# Patient Record
Sex: FEMALE | Race: BLACK OR AFRICAN AMERICAN | NOT HISPANIC OR LATINO | Employment: OTHER | URBAN - METROPOLITAN AREA
[De-identification: names, ages, dates, MRNs, and addresses within clinical notes are randomized per-mention and may not be internally consistent; named-entity substitution may affect disease eponyms.]

---

## 2017-03-15 ENCOUNTER — GENERIC CONVERSION - ENCOUNTER (OUTPATIENT)
Dept: OTHER | Facility: OTHER | Age: 67
End: 2017-03-15

## 2017-03-15 ENCOUNTER — ALLSCRIPTS OFFICE VISIT (OUTPATIENT)
Dept: OTHER | Facility: OTHER | Age: 67
End: 2017-03-15

## 2017-03-15 DIAGNOSIS — Z13.820 ENCOUNTER FOR SCREENING FOR OSTEOPOROSIS: ICD-10-CM

## 2017-03-15 DIAGNOSIS — E11.9 TYPE 2 DIABETES MELLITUS WITHOUT COMPLICATIONS (HCC): ICD-10-CM

## 2017-03-15 DIAGNOSIS — I10 ESSENTIAL (PRIMARY) HYPERTENSION: ICD-10-CM

## 2017-03-15 DIAGNOSIS — R05.9 COUGH: ICD-10-CM

## 2017-03-15 DIAGNOSIS — Z12.31 ENCOUNTER FOR SCREENING MAMMOGRAM FOR MALIGNANT NEOPLASM OF BREAST: ICD-10-CM

## 2017-03-15 DIAGNOSIS — E78.5 HYPERLIPIDEMIA: ICD-10-CM

## 2017-03-15 DIAGNOSIS — Z12.11 ENCOUNTER FOR SCREENING FOR MALIGNANT NEOPLASM OF COLON: ICD-10-CM

## 2017-03-15 DIAGNOSIS — E55.9 VITAMIN D DEFICIENCY: ICD-10-CM

## 2017-03-15 DIAGNOSIS — Z78.0 ASYMPTOMATIC MENOPAUSAL STATE: ICD-10-CM

## 2017-03-21 ENCOUNTER — APPOINTMENT (OUTPATIENT)
Dept: LAB | Facility: CLINIC | Age: 67
End: 2017-03-21
Payer: COMMERCIAL

## 2017-03-21 DIAGNOSIS — E11.9 TYPE 2 DIABETES MELLITUS WITHOUT COMPLICATIONS (HCC): ICD-10-CM

## 2017-03-21 DIAGNOSIS — E78.5 HYPERLIPIDEMIA: ICD-10-CM

## 2017-03-21 DIAGNOSIS — E55.9 VITAMIN D DEFICIENCY: ICD-10-CM

## 2017-03-21 DIAGNOSIS — I10 ESSENTIAL (PRIMARY) HYPERTENSION: ICD-10-CM

## 2017-03-21 LAB
25(OH)D3 SERPL-MCNC: 34.4 NG/ML (ref 30–100)
ALBUMIN SERPL BCP-MCNC: 3.8 G/DL (ref 3.5–5)
ALP SERPL-CCNC: 100 U/L (ref 46–116)
ALT SERPL W P-5'-P-CCNC: 21 U/L (ref 12–78)
ANION GAP SERPL CALCULATED.3IONS-SCNC: 8 MMOL/L (ref 4–13)
AST SERPL W P-5'-P-CCNC: 16 U/L (ref 5–45)
BILIRUB SERPL-MCNC: 0.54 MG/DL (ref 0.2–1)
BUN SERPL-MCNC: 22 MG/DL (ref 5–25)
CALCIUM SERPL-MCNC: 9.4 MG/DL (ref 8.3–10.1)
CHLORIDE SERPL-SCNC: 101 MMOL/L (ref 100–108)
CHOLEST SERPL-MCNC: 200 MG/DL (ref 50–200)
CO2 SERPL-SCNC: 28 MMOL/L (ref 21–32)
CREAT SERPL-MCNC: 1.1 MG/DL (ref 0.6–1.3)
ERYTHROCYTE [DISTWIDTH] IN BLOOD BY AUTOMATED COUNT: 13.8 % (ref 11.6–15.1)
EST. AVERAGE GLUCOSE BLD GHB EST-MCNC: 137 MG/DL
GFR SERPL CREATININE-BSD FRML MDRD: >60 ML/MIN/1.73SQ M
GLUCOSE P FAST SERPL-MCNC: 99 MG/DL (ref 65–99)
HBA1C MFR BLD: 6.4 % (ref 4.2–6.3)
HCT VFR BLD AUTO: 37 % (ref 34.8–46.1)
HDLC SERPL-MCNC: 51 MG/DL (ref 40–60)
HGB BLD-MCNC: 12.5 G/DL (ref 11.5–15.4)
LDLC SERPL CALC-MCNC: 133 MG/DL (ref 0–100)
MCH RBC QN AUTO: 29.3 PG (ref 26.8–34.3)
MCHC RBC AUTO-ENTMCNC: 33.8 G/DL (ref 31.4–37.4)
MCV RBC AUTO: 87 FL (ref 82–98)
PLATELET # BLD AUTO: 334 THOUSANDS/UL (ref 149–390)
PMV BLD AUTO: 9.2 FL (ref 8.9–12.7)
POTASSIUM SERPL-SCNC: 3.3 MMOL/L (ref 3.5–5.3)
PROT SERPL-MCNC: 8.7 G/DL (ref 6.4–8.2)
RBC # BLD AUTO: 4.26 MILLION/UL (ref 3.81–5.12)
SODIUM SERPL-SCNC: 137 MMOL/L (ref 136–145)
TRIGL SERPL-MCNC: 81 MG/DL
WBC # BLD AUTO: 5.62 THOUSAND/UL (ref 4.31–10.16)

## 2017-03-21 PROCEDURE — 80061 LIPID PANEL: CPT

## 2017-03-21 PROCEDURE — 36415 COLL VENOUS BLD VENIPUNCTURE: CPT

## 2017-03-21 PROCEDURE — 82306 VITAMIN D 25 HYDROXY: CPT

## 2017-03-21 PROCEDURE — 83036 HEMOGLOBIN GLYCOSYLATED A1C: CPT

## 2017-03-21 PROCEDURE — 80053 COMPREHEN METABOLIC PANEL: CPT

## 2017-03-21 PROCEDURE — 85027 COMPLETE CBC AUTOMATED: CPT

## 2017-03-30 ENCOUNTER — TRANSCRIBE ORDERS (OUTPATIENT)
Dept: ADMINISTRATIVE | Age: 67
End: 2017-03-30

## 2017-03-30 ENCOUNTER — APPOINTMENT (OUTPATIENT)
Dept: LAB | Age: 67
End: 2017-03-30
Payer: COMMERCIAL

## 2017-03-30 DIAGNOSIS — Z12.11 ENCOUNTER FOR SCREENING FOR MALIGNANT NEOPLASM OF COLON: ICD-10-CM

## 2017-03-30 LAB — HEMOCCULT STL QL IA: NEGATIVE

## 2017-03-30 PROCEDURE — G0328 FECAL BLOOD SCRN IMMUNOASSAY: HCPCS

## 2017-04-11 ENCOUNTER — ALLSCRIPTS OFFICE VISIT (OUTPATIENT)
Dept: OTHER | Facility: OTHER | Age: 67
End: 2017-04-11

## 2017-04-12 ENCOUNTER — APPOINTMENT (OUTPATIENT)
Dept: LAB | Facility: HOSPITAL | Age: 67
End: 2017-04-12
Payer: COMMERCIAL

## 2017-04-12 DIAGNOSIS — R05.9 COUGH: ICD-10-CM

## 2017-04-12 PROCEDURE — 36415 COLL VENOUS BLD VENIPUNCTURE: CPT

## 2017-04-12 PROCEDURE — 86003 ALLG SPEC IGE CRUDE XTRC EA: CPT

## 2017-04-12 PROCEDURE — 82785 ASSAY OF IGE: CPT

## 2017-04-13 LAB
A ALTERNATA IGE QN: <0.1 KUA/I
A FUMIGATUS IGE QN: <0.1 KUA/I
ALLERGEN COMMENT: ABNORMAL
BERMUDA GRASS IGE QN: <0.1 KUA/I
BOXELDER IGE QN: <0.1 KUA/I
C HERBARUM IGE QN: <0.1 KUA/I
CAT DANDER IGE QN: 0.53 KUA/I
CMN PIGWEED IGE QN: <0.1 KUA/I
COMMON RAGWEED IGE QN: <0.1 KUA/I
COTTONWOOD IGE QN: <0.1 KUA/I
D FARINAE IGE QN: 1.56 KUA/I
D PTERONYSS IGE QN: <0.1 KUA/I
DOG DANDER IGE QN: 1.63 KUA/I
LONDON PLANE IGE QN: <0.1 KUA/I
MOUSE URINE PROT IGE QN: 0.67 KUA/I
MT JUNIPER IGE QN: <0.1 KUA/I
MUGWORT IGE QN: <0.1 KUA/I
P NOTATUM IGE QN: <0.1 KUA/I
ROACH IGE QN: 0.64 KUA/I
SHEEP SORREL IGE QN: <0.1 KUA/I
SILVER BIRCH IGE QN: <0.1 KUA/I
TIMOTHY IGE QN: <0.1 KUA/I
TOTAL IGE SMQN RAST: 145 KU/L (ref 0–113)
WALNUT IGE QN: <0.1 KUA/I
WHITE ASH IGE QN: <0.1 KUA/I
WHITE ELM IGE QN: <0.1 KUA/I
WHITE MULBERRY IGE QN: <0.1 KUA/I
WHITE OAK IGE QN: <0.1 KUA/I

## 2017-06-12 ENCOUNTER — ALLSCRIPTS OFFICE VISIT (OUTPATIENT)
Dept: OTHER | Facility: OTHER | Age: 67
End: 2017-06-12

## 2017-06-12 DIAGNOSIS — E11.9 TYPE 2 DIABETES MELLITUS WITHOUT COMPLICATIONS (HCC): ICD-10-CM

## 2017-06-12 DIAGNOSIS — E78.5 HYPERLIPIDEMIA: ICD-10-CM

## 2017-06-12 DIAGNOSIS — Z78.0 ASYMPTOMATIC MENOPAUSAL STATE: ICD-10-CM

## 2017-06-12 DIAGNOSIS — E55.9 VITAMIN D DEFICIENCY: ICD-10-CM

## 2017-06-12 DIAGNOSIS — Z13.820 ENCOUNTER FOR SCREENING FOR OSTEOPOROSIS: ICD-10-CM

## 2017-06-12 DIAGNOSIS — R92.8 OTHER ABNORMAL AND INCONCLUSIVE FINDINGS ON DIAGNOSTIC IMAGING OF BREAST: ICD-10-CM

## 2017-06-12 DIAGNOSIS — I10 ESSENTIAL (PRIMARY) HYPERTENSION: ICD-10-CM

## 2017-06-20 ENCOUNTER — HOSPITAL ENCOUNTER (OUTPATIENT)
Dept: RADIOLOGY | Age: 67
Discharge: HOME | End: 2017-06-20
Payer: COMMERCIAL

## 2017-06-20 DIAGNOSIS — E55.9 VITAMIN D DEFICIENCY: ICD-10-CM

## 2017-06-20 DIAGNOSIS — Z12.31 ENCOUNTER FOR SCREENING MAMMOGRAM FOR MALIGNANT NEOPLASM OF BREAST: ICD-10-CM

## 2017-06-20 DIAGNOSIS — Z78.0 ASYMPTOMATIC MENOPAUSAL STATE: ICD-10-CM

## 2017-06-20 DIAGNOSIS — Z13.820 ENCOUNTER FOR SCREENING FOR OSTEOPOROSIS: ICD-10-CM

## 2017-06-20 PROCEDURE — G0202 SCR MAMMO BI INCL CAD: HCPCS

## 2017-06-20 PROCEDURE — 77080 DXA BONE DENSITY AXIAL: CPT

## 2017-06-26 ENCOUNTER — GENERIC CONVERSION - ENCOUNTER (OUTPATIENT)
Dept: OTHER | Facility: OTHER | Age: 67
End: 2017-06-26

## 2017-07-07 ENCOUNTER — HOSPITAL ENCOUNTER (OUTPATIENT)
Dept: MAMMOGRAPHY | Facility: CLINIC | Age: 67
Discharge: HOME | End: 2017-07-07
Payer: COMMERCIAL

## 2017-07-07 ENCOUNTER — HOSPITAL ENCOUNTER (OUTPATIENT)
Dept: ULTRASOUND IMAGING | Facility: CLINIC | Age: 67
Discharge: HOME | End: 2017-07-07
Payer: COMMERCIAL

## 2017-07-07 DIAGNOSIS — R92.8 OTHER ABNORMAL AND INCONCLUSIVE FINDINGS ON DIAGNOSTIC IMAGING OF BREAST: ICD-10-CM

## 2017-07-07 DIAGNOSIS — R92.8 ABNORMAL MAMMOGRAM, UNSPECIFIED: ICD-10-CM

## 2017-07-07 PROCEDURE — 76642 ULTRASOUND BREAST LIMITED: CPT

## 2017-07-07 PROCEDURE — G0206 DX MAMMO INCL CAD UNI: HCPCS

## 2017-07-19 ENCOUNTER — GENERIC CONVERSION - ENCOUNTER (OUTPATIENT)
Dept: OTHER | Facility: OTHER | Age: 67
End: 2017-07-19

## 2017-07-25 ENCOUNTER — GENERIC CONVERSION - ENCOUNTER (OUTPATIENT)
Dept: OTHER | Facility: OTHER | Age: 67
End: 2017-07-25

## 2017-11-07 ENCOUNTER — APPOINTMENT (OUTPATIENT)
Dept: LAB | Facility: HOSPITAL | Age: 67
End: 2017-11-07
Attending: FAMILY MEDICINE
Payer: COMMERCIAL

## 2017-11-07 PROCEDURE — 36415 COLL VENOUS BLD VENIPUNCTURE: CPT | Performed by: FAMILY MEDICINE

## 2017-11-07 PROCEDURE — 85025 COMPLETE CBC W/AUTO DIFF WBC: CPT | Performed by: FAMILY MEDICINE

## 2017-11-07 PROCEDURE — 80053 COMPREHEN METABOLIC PANEL: CPT | Performed by: FAMILY MEDICINE

## 2017-11-07 PROCEDURE — 82043 UR ALBUMIN QUANTITATIVE: CPT | Performed by: FAMILY MEDICINE

## 2017-11-07 PROCEDURE — 84443 ASSAY THYROID STIM HORMONE: CPT | Performed by: FAMILY MEDICINE

## 2017-11-07 PROCEDURE — 82550 ASSAY OF CK (CPK): CPT | Performed by: FAMILY MEDICINE

## 2017-11-07 PROCEDURE — 82570 ASSAY OF URINE CREATININE: CPT | Performed by: FAMILY MEDICINE

## 2017-11-07 PROCEDURE — 80061 LIPID PANEL: CPT | Performed by: FAMILY MEDICINE

## 2017-11-07 PROCEDURE — 83036 HEMOGLOBIN GLYCOSYLATED A1C: CPT | Performed by: FAMILY MEDICINE

## 2017-11-10 ENCOUNTER — ALLSCRIPTS OFFICE VISIT (OUTPATIENT)
Dept: OTHER | Facility: OTHER | Age: 67
End: 2017-11-10

## 2017-11-11 NOTE — PROGRESS NOTES
Assessment    1  Hypokalemia (276 8) (E87 6)  2  Benign essential HTN (401 1) (I10)  3  Depression (311) (F32 9)  4  Postmenopausal (V49 81) (Z78 0)  5  Type 2 diabetes mellitus (250 00) (E11 9)  6  Vitamin D deficiency (268 9) (E55 9)  7  Tremor (781 0) (R25 1)  8  Skin lesion of cheek (709 9) (L98 9)    Plan  Hypokalemia    · Renew: Potassium Chloride ER 20 MEQ Oral Tablet Extended Release; Take 1 tablet daily   · (1) BASIC METABOLIC PROFILE; Status:Active; Requested for:17Nov2017;    · (1) MAGNESIUM; Status:Active; Requested for:17Nov2017;   Need for influenza vaccination    · Administered: Fluzone High-Dose 0 5 ML Intramuscular Suspension Prefilled Syringe  PMH: Depression screening    · PHQ-9 Adult Depression Screening ; every 1 year; Last 06YWI9771; Next 95FCW3661; Status:Active  PMH: Encounter for screening for other nervous system disorders    · *VB - Fall Risk Assessment  (Dx Z13 89 Screen for Neurologic Disorder) ; every 1 year; [id number]; Next 24XQW2156; Status:Active  Screening for genitourinary condition    · *VB - Urinary Incontinence Screen (Dx Z13 89 Screen for UI); Status:Complete - Retrospective ByProtocol Authorization;   Done: 84DMZ3719 10:14AM  Screening for neurological condition    · *VB - Fall Risk Assessment  (Dx Z13 89 Screen for Neurologic Disorder); Status:Complete -Retrospective By Protocol Authorization;   Done: 77VFQ6867 09:56AM  Tremor    · *1 - [facility] Co-Management  *  Status: Active  Requested for: 31MLC8174  () Care Summary provided  : Yes   · * MRI BRAIN W WO CONTRAST; Status:Need Information - Financial Authorization; Requestedfor:10Nov2017;   Type 2 diabetes mellitus    · *VB - Eye Exam; Status:Active; Requested for:10Nov2017;    · *VB - Foot Exam; Status:Active;  Requested for:10Nov2017;    · *VB - Diabetic Eye Exam Co-Management  *  Status: Active  Requested for: 29TFO1069  () Care Summary provided  : Yes    Discussion/Summary  Discussion Summary:   Reviewed labs with patient and daughter  FIT test negative  3/30/17 will continue yearly FIT test  Potassium decreased on last lab set as well (briefly supplemented but then completed)  Due to persistance of low K, Start Kcl 20 mEq daily  Check BMP/Mag in 1 week  Continue balanced diet  Urine MCR noted, patient is on ARB, continue BP meds  A1C at goal (6 3), continue to follow  Lipids reviewed, continue low fat low cholesterol diet with Lipitor (LFT and CK normal)  Allergy testing reviewed, multiple allergies noted  Mammogram 7/25/17 with ultrasound  Repeat imaging 6 months from last, order printed and granddaughter notes scheduled for 12/12/17  DEXA 6/26/17, continue daily activity, calcium with D supplement  With tremors, suspect essential tremor, labs reviewed  Will check MRI and proceed with neuro eval  Flu shot today  Get shingles shot  Counseling Documentation With Imm: The patient was counseled regarding diagnostic results,-- instructions for management,-- risk factor reductions,-- prognosis,-- patient and family education,-- impressions,-- risks and benefits of treatment options,-- importance of compliance with treatment  Medication SE Review and Pt Understands Tx: Possible side effects of new medications were reviewed with the patient/guardian today  The treatment plan was reviewed with the patient/guardian  The patient/guardian understands and agrees with the treatment plan      Chief Complaint    Chief Complaint Free Text Note Form: pt here for f/u of HTNwould like to discuss another inhaler that is cheaper if she still needsblood work      History of Present Illness  HPI: 79 to female for follow up  Notes she did not take K supplement, pharmacy has not filled  Here with grand daughter and great grand daughter  Doing much better  Taking care of her great granddaughter  NOtes with babyangelinetting has more energy  Doing well   Has multiple tests done this year, up to date for HM and grand-daughter is seeing that she goes for testing as directed  Head tremor noted more at rest, present since daughter passes away  Related it more to stress but persistsnt Not with activity  Hx of vertigo some time ago and was seen in ER  No HA or dizziness at this time  No weakness  Wants to get lesion on right cheek evaluated  Increasing in size  No pain  Electrolyte Imbalance (Brief): Symptoms:  Low K on labs  Has not started K  Notes she plans to start today  Does have balanced diet  , but-- no diarrhea,-- no constipation,-- no dehydration,-- no decreased urine output,-- no paresthesias,-- no muscle cramps-- and-- no cardiac arrhythmias  Depression (Follow-Up): The patient states her depression has Anxiety and depressive sx have been stable  Grand-daughter supportive, less stress at home , but been stable since the last visit  Interval Symptoms:   Vitamin D Deficiency: The patient is being seen for follow-up of and Taking D supplement  vitamin D deficiency  Hypertension (Follow-Up): The patient presents for follow-up of essential hypertension  The patient states she has been stable with her blood pressure control since the last visit  Symptoms: denies dyspnea-- and-- denies chest pain  Home monitoring: The patient checks her blood pressure sporadically  Medications: the patient is adherent with her medication regimen  Additional History: Does not check BP at home  Diabetes Type II (Follow-Up): The patient states she has been stable with her Type II Diabetes control since the last visit  Comorbid Illnesses: hypertension,-- hyperlipidemia-- and-- obesity  Symptoms: denies a foot ulcer-- and-- denies foot pain  Home monitoring: Glycemic control has been good  -- the patient reports no symptomatic hypoglycemic episodes  Essential Tremor (Brief): The patient is being seen for an initial evaluation of essential tremor   Symptoms:  tremor, but-- no muscle stiffness,-- no muscle weakness,-- no poor coordination,-- no difficulty speaking-- and-- no difficulty walking  Associated symptoms:  anxiety, but-- no muscle pain-- and-- no palpitations  Review of Systems  Complete-Female:  Constitutional: no recent weight gain-- and-- no recent weight loss  Cardiovascular: no chest pain  Respiratory: cough-- and-- intermittent cough when by her grand-daughters dogs  , but-- no shortness of breath  Gastrointestinal: no abdominal pain,-- no vomiting,-- no diarrhea-- and-- no blood in stools  Preventive Quality 65 Older:  Preventive Quality 65 and Older: Falls Risk: The patient fell 0 times in the past 12 months  Symptoms Include: leg weakness  The patient is currently experiencing fall symptoms  The patient is currently experiencing urinary symptoms  Urinary Incontinence Symptoms includes: urinary incontinence, nocturia and vaginal dryness       Active Problems  1  Abnormal mammogram of right breast (793 80) (R92 8)  2  Advance directive in chart (V49 89) (Z78 9)  3  Allergic cough (786 2) (R05)  4  Allergic rhinitis (477 9) (J30 9)  5  Benign essential HTN (401 1) (I10)  6  Depression (311) (F32 9)  7  DJD (degenerative joint disease) of knee (715 36) (M17 10)  8  Dyslipidemia (272 4) (E78 5)  9  Hypokalemia (276 8) (E87 6)  10  Osteoarthritis of right knee (715 96) (M17 11)  11  Postmenopausal (V49 81) (Z78 0)  12  Type 2 diabetes mellitus (250 00) (E11 9)  13  Vitamin D deficiency (268 9) (E55 9)    Past Medical History  1  History of Anxiety (300 00) (F41 9)  2  History of Arthritis (716 90) (M19 90)  3  History of Atypical chest pain (786 59) (R07 89)  4  History of B-complex deficiency (266 9) (E53 9)  5  History of Cataract (366 9) (H26 9)  6  History of Elevated serum creatinine (790 99) (R79 89)  7  History of Glaucoma (365 9) (H40 9)  8  History of Gout (274 9) (M10 9)  9  History of abdominal pain (V13 89) (Z87 898)  10  History of backache (V13 59) (Z87 39)  11  History of chest pain (V13 89) (Z87 898)  12   History of dizziness (V13 89) (Z87 898)  13  History of herpes zoster (V12 09) (Z86 19)  14  History of hypercalcemia (V12 29) (Z86 39)  15  History of hyperlipidemia (V12 29) (Z86 39)  16  History of seasonal allergies (V15 09) (Z88 9)  17  History of Irregular heart beat (427 9) (I49 9)  18  History of Memory loss (780 93) (R41 3)  19  History of Osteoporosis screening (V82 81) (Z13 820)  20  History of Palpitations (785 1) (R00 2)  21  History of Plantar fasciitis (728 71) (M72 2)  22  History of Scalp cyst (706 2) (L72 9)  23  History of Suicidal ideation (V62 84) (R45 851)  24  History of Urinary problem (V47 4) (R39 89)  Active Problems And Past Medical History Reviewed: The active problems and past medical history were reviewed and updated today  Surgical History  1  History of Tubal Ligation  Surgical History Reviewed: The surgical history was reviewed and updated today  Family History  Mother   1  Family history of Benign essential HTN  2  Family history of Cancer  Father   3  Family history of Benign essential HTN  4  Family history of Cancer  Daughter   5  Family history of hepatic cirrhosis (V18 59) (Z83 79)  6  Family history of type C viral hepatitis (V18 8) (Z83 1)  Sister   7  Family history of Diabetes  Family History Reviewed: The family history was reviewed and updated today  Social History     · Advance directive in chart (V49 89) (Z78 9)   · Never a smoker   · No alcohol use   · No illicit drug use  Social History Reviewed: The social history was reviewed and updated today  Current Meds  1  AmLODIPine Besylate 10 MG Oral Tablet; Take 1 tablet daily  Requested for: 39QJV1269; Last Rx:05Oct2017 Ordered  2  Aspirin 81 MG TABS; Take 1 tablet daily; Therapy: (Recorded:14Jan2015) to Recorded  3  Atorvastatin Calcium 40 MG Oral Tablet; TAKE 1 TABLET DAILY  Requested for: 20MXR0853; Last Rx:05Oct2017 Ordered  4  Cetirizine HCl - 10 MG Oral Tablet;  Take 1 tablet daily  Requested for: 28Zej1560; Last Rx: 87EJZ1480 Ordered  5  FLUoxetine HCl - 20 MG Oral Tablet; TAKE 1 TABLET DAILY  Requested for: 69YHF8469; Last Rx:05Oct2017 Ordered  6  Fluticasone Propionate 50 MCG/ACT Nasal Suspension; USE 1 SPRAY IN EACH NOSTRIL ONCE DAILY; Therapy: 86XYV0215 to (Last HB:32NPY3500)  Requested for: 70KCI2406 Ordered  7  HydroCHLOROthiazide 25 MG Oral Tablet; TAKE 1 TABLET DAILY; Therapy: 33VOB4688 to (Evaluate:02Apr2018)  Requested for: 28BCL0229; Last Rx:05Oct2017 Ordered  8  Losartan Potassium 100 MG Oral Tablet; Take 1 tablet daily  Requested for: 13DSQ0950; Last Rx:05Oct2017 Ordered  9  ProAir  (90 Base) MCG/ACT Inhalation Aerosol Solution; INHALE 1 TO 2 PUFFS EVERY 4 TO 6 HOURS AS NEEDED; Therapy: 78Cgd0621 to (Last Rx:67Act7329)  Requested for: 52Kcz2765 Ordered  10  Vitamin D3 2000 UNIT Oral Capsule; take 1 capsule daily; Therapy: 05XMT9081 to (Last Rx:04Jun2015)  Requested for: 97JSN2161 Ordered  11  Zostavax 55567 UNT/0 65ML Subcutaneous Solution Reconstituted; as directed; Therapy: 14CYQ3617 to (Last Rx:14Jan2015) Ordered  Medication List Reviewed: The medication list was reviewed and updated today  Allergies  1  Lisinopril TABS  2  Seasonal    Vitals  Vital Signs    Recorded: 78JOG1830 09:39AM   Temperature 98 1 F, Oral   Heart Rate 74   Systolic 939, RUE, Sitting   Diastolic 58, RUE, Sitting   Height 5 ft 3 in   Weight 183 lb 8 oz   BMI Calculated 32 51   BSA Calculated 1 86   O2 Saturation 97, RA       Physical Exam   Constitutional  General appearance: No acute distress, well appearing and well nourished  -- Alert, in NAD  Eyes Reactive, EOMI  Ears, Nose, Mouth, and Throat  External inspection of ears and nose: Abnormal  -- (Right cheek skin lesion, smooth, brown, irregular base, slight darkening to middle aspect of the lesion  Right of mouth skin lesion, hypertrophied skin, rough appearance, non-tender)  Oropharynx: Normal with no erythema, edema, exudate or lesions  -- MMM, normal pharynx  Pulmonary  Respiratory effort: No increased work of breathing or signs of respiratory distress  -- CTA  Auscultation of lungs: Clear to auscultation  Cardiovascular  Auscultation of heart: Normal rate and rhythm, normal S1 and S2, without murmurs  -- RRR  Abdomen  Abdomen: Non-tender, no masses  -- Soft, NT/ND  +BS  Lymphatic Supple  Musculoskeletal  Gait and station: Abnormal  -- (NO rigidity, mild resting head tremor  No focal weakness  Gait stable  )  Psychiatric  Mood and affect: Normal    Diabetic Foot Screen: Normal    Socks and shoes removed, the Right Foot: the foot was not swollen, not erythematous and not dry  The toes on the right were not erythematous  Normal tactile sensation with monofilament testing throughout the right foot  Socks and shoes removed, Left Foot: the foot was not swollen, not erythematous and not dry  The toes on the left were not erythematous  Normal tactile sensation with monofilament testing throughout the left foot  Pulses:  2+ in the dorsalis pedis on the right  Pulses:  2+ in the dorsalis pedis on the left  Assign Risk Category: 0: No loss of protective sensation, no deformity  No present risk  Results/Data  (1) COMPREHENSIVE METABOLIC PANEL 03LAA6667 22:66LK Lula Thornton    Order Number: [id number]     Test Name Result Flag Reference   SODIUM 138 mmol/L  136-145   POTASSIUM 3 0 mmol/L L 3 5-5 3   CHLORIDE 101 mmol/L  100-108   CARBON DIOXIDE 28 mmol/L  21-32   ANION GAP (CALC) 9 mmol/L  4-13   BLOOD UREA NITROGEN 30 mg/dL H 5-25   CREATININE 1 29 mg/dL  0 60-1 30   Standardized to IDMS reference method   CALCIUM 9 5 mg/dL  8 3-10 1   BILI, TOTAL 0 70 mg/dL  0 20-1 00   ALK PHOSPHATAS 104 U/L     ALT (SGPT) 20 U/L  12-78   Specimen collection should occur prior to Sulfasalazine administration due to the potential for falsely depressed results     AST(SGOT) 23 U/L  5-45   Specimen collection should occur prior to Sulfasalazine administration due to the potential for falsely depressed results  ALBUMIN 3 9 g/dL  3 5-5 0   TOTAL PROTEIN 8 9 g/dL H 6 4-8 2   eGFR 50 ml/min/1 73sq m       National Kidney Disease Education Program recommendations are as follows: GFR calculation is accurate only with a steady state creatinine Chronic Kidney disease less than 60 ml/min/1 73 sq  meters Kidney failure less than 15 ml/min/1 73 sq  meters  GLUCOSE FASTING 113 mg/dL H 65-99   Specimen collection should occur prior to Sulfasalazine administration due to the potential for falsely depressed results  Specimen collection should occur prior to Sulfapyridine administration due to the potential for falsely elevated results  (1) HEMOGLOBIN A1C 04BOO6292 09:54AM Mami Woodardli    Order Number: [id number]     Test Name Result Flag Reference   HEMOGLOBIN A1C 6 3 %  4 2-6 3   EST  AVG  GLUCOSE 134 mg/dl       (1) TSH 51QPX7442 09:54AM Mami Woodardli    Order Number: [id number]     Test Name Result Flag Reference   TSH 0 517 uIU/mL  0 358-3 740     Patients undergoing fluorescein dye angiography may retain small amounts of fluorescein in the body for 48-72 hours post procedure  Samples containing fluorescein can produce falsely depressed TSH values  If the patient had this procedure,a specimen should be resubmitted post fluorescein clearance  The recommended reference ranges for TSH during pregnancy are as follows: First trimester 0 1 to 2 5 uIU/mL Second trimester  0 2 to 3 0 uIU/mL Third trimester 0 3 to 3 0 uIU/m     (1) MICROALBUMIN CREATININE RATIO, RANDOM URINE 59NXS4253 09:54AM Mami Woodardli    Order Number: [id number]     Test Name Result Flag Reference   MICROALBUMIN/ CREAT R 8 mg/g creatinine  0-30   MICROALBUMIN,URINE 11 7 mg/L  0 0-20 0   CREATININE URINE 153 0 mg/dL           Health Management  Depression screening   PHevery-9 Adult Depression Screening; every 1 year; Last 26GZJ5267; Next Due: 41VTZ1636; Active  History of Encounter for screening for other nervous system disorders   *VB - Fall Risk Assessment  (Dx Z13 89 Screen for Neurologic Disorder); every 1 year; [id number]; Next Due: 08KUT3197;  Active    Signatures   Electronically signed by : Roshan Cruz [facility]; Nov 10 2017 12:54PM EST                       (Author)    Electronically signed by : Anahi Chiu DO; Nov 10 2017  2:59PM EST                          Electronically signed by : Roshan Cruz [facility]; Nov 10 2017  5:11PM EST                       (Author)    Electronically signed by : Roshan Cruz [facility]; Nov 10 2017  5:12PM EST                       (Author)    Electronically signed by : Anahi Chiu DO; Nov 10 2017  5:26PM EST

## 2017-11-17 ENCOUNTER — APPOINTMENT (OUTPATIENT)
Dept: LAB | Facility: HOSPITAL | Age: 67
End: 2017-11-17
Payer: COMMERCIAL

## 2017-11-17 DIAGNOSIS — E87.6 HYPOKALEMIA: ICD-10-CM

## 2017-11-17 DIAGNOSIS — E83.42 HYPOMAGNESEMIA: ICD-10-CM

## 2017-11-17 LAB
ANION GAP SERPL CALCULATED.3IONS-SCNC: 8 MMOL/L (ref 4–13)
BUN SERPL-MCNC: 19 MG/DL (ref 5–25)
CALCIUM SERPL-MCNC: 9.5 MG/DL (ref 8.3–10.1)
CHLORIDE SERPL-SCNC: 101 MMOL/L (ref 100–108)
CO2 SERPL-SCNC: 29 MMOL/L (ref 21–32)
CREAT SERPL-MCNC: 1.09 MG/DL (ref 0.6–1.3)
GFR SERPL CREATININE-BSD FRML MDRD: 61 ML/MIN/1.73SQ M
GLUCOSE P FAST SERPL-MCNC: 110 MG/DL (ref 65–99)
MAGNESIUM SERPL-MCNC: 1.5 MG/DL (ref 1.6–2.6)
POTASSIUM SERPL-SCNC: 3.9 MMOL/L (ref 3.5–5.3)
SODIUM SERPL-SCNC: 138 MMOL/L (ref 136–145)

## 2017-11-17 PROCEDURE — 80048 BASIC METABOLIC PNL TOTAL CA: CPT

## 2017-11-17 PROCEDURE — 36415 COLL VENOUS BLD VENIPUNCTURE: CPT

## 2017-11-17 PROCEDURE — 83735 ASSAY OF MAGNESIUM: CPT

## 2017-12-12 ENCOUNTER — HOSPITAL ENCOUNTER (OUTPATIENT)
Dept: ULTRASOUND IMAGING | Facility: CLINIC | Age: 67
Discharge: HOME | End: 2017-12-12
Payer: COMMERCIAL

## 2017-12-12 ENCOUNTER — TRANSCRIBE ORDERS (OUTPATIENT)
Dept: MAMMOGRAPHY | Facility: CLINIC | Age: 67
End: 2017-12-12

## 2017-12-12 ENCOUNTER — HOSPITAL ENCOUNTER (OUTPATIENT)
Dept: MAMMOGRAPHY | Facility: CLINIC | Age: 67
Discharge: HOME | End: 2017-12-12
Payer: COMMERCIAL

## 2017-12-12 DIAGNOSIS — R92.8 OTHER ABNORMAL AND INCONCLUSIVE FINDINGS ON DIAGNOSTIC IMAGING OF BREAST: ICD-10-CM

## 2017-12-12 DIAGNOSIS — R92.8 ABNORMAL MAMMOGRAM: Primary | ICD-10-CM

## 2017-12-12 PROCEDURE — G0279 TOMOSYNTHESIS, MAMMO: HCPCS

## 2017-12-12 PROCEDURE — G0206 DX MAMMO INCL CAD UNI: HCPCS

## 2017-12-18 ENCOUNTER — GENERIC CONVERSION - ENCOUNTER (OUTPATIENT)
Dept: OTHER | Facility: OTHER | Age: 67
End: 2017-12-18

## 2018-01-08 DIAGNOSIS — R92.8 OTHER ABNORMAL AND INCONCLUSIVE FINDINGS ON DIAGNOSTIC IMAGING OF BREAST: ICD-10-CM

## 2018-01-10 NOTE — RESULT NOTES
Verified Results  *US BREAST RIGHT LIMITED (DIAGNOSTIC) 73LXS5142 02:40PM Jerrod Paul     Test Name Result Flag Reference   US BREAST RIGHT LIMITED (Report)     Patient History:   Patient is postmenopausal    Family history of breast cancer in maternal cousin, colorectal    cancer in mother  Patient has never smoked  Patient's BMI is 26 2  Reason for exam: addl evaluation requested from abnormal    screening  59-year-old female with right breast nodular asymmetry and right    breast calcifications described on recent baseline screening    mammogram      Mammo Diagnostic Right W CAD: July 7, 2017 - Check In #: [de-identified]   CC with magnification, ML with magnification, and ML view(s) were   taken of the right breast      Technologist: FRANCO Leonardo (FRANCO)(M)   Prior study comparison: June 20, 2017, mammo screening bilateral    W CAD, performed at [address] [place] [address]  There are scattered fibroglandular densities  US Breast Right Limited: July 7, 2017 - Check In #: [de-identified]   Standard views  Technologist: RT Saúl (R), RDMS   A uniformly echogenic layer of fibroglandular tissue  Examination of the spot magnification views of the right breast    reveals a group of indeterminate, but not worrisome appearing,    calcifications in the upper outer right breast, at approximately    the 10 o'clock position, 18 cm from the nipple  As this is the    patient's 1st examination, and as I cannot determine the    stability of these calcifications, I would recommend repeat right   diagnostic mammogram in 6 months to ensure continuing stability  A nodular density was described in the outer posterior right    breast, thought to possibly represent nodular fibroglandular    tissue  The patient was taken to the ultrasound suite for further    evaluation       RIGHT BREAST ULTRASOUND:     Grayscale sonography of the upper outer posterior right breast    reveals the presence of a anechoic well-defined nodule at the 11    o'clock position, 11 cm from the nipple, measuring 0 3 x 0 3 x    0 3 cm  I suspect this is an incidental finding, as it measures    much smaller than the nodular asymmetry seen on mammography  There are no other cystic or solid nodules identified in the    upper outer right breast      1  Probably benign upper outer right breast calcifications  Repeat right diagnostic mammogram in 6 months is recommended  2  Upper outer right breast nodular asymmetry has no definite    sonographic correlate and may represent nodular fibroglandular    tissue  This also should be reevaluated with repeat right    diagnostic mammogram in 6 months  ACR BI-RADSï¾® Assessments: BiRad:3 - Probably Benign (Overall)   Right breast Right Diag Mamm: BiRad:3 - probably benign finding    in the right breast    Right breast Right Brst US: BiRad:2 - benign finding in the right   breast      Recommendation:   Follow-up diagnostic mammogram of the right breast in 6 months  Analyzed by CAD     The patient is scheduled in a reminder system  Transcription Location: [facility] 98: [id number]     Risk Value(s):   Tyrer-Cuzick 10 Year: 2 400%, Tyrer-Cuzick Lifetime: 4 600%,    Myriad Table: 1 5%, MICHAEL 5 Year: 1 7%, NCI Lifetime: 5 5%   Signed by:   Frank Jin MD   7/7/17     * MAMMO SCREENING BILATERAL W CAD [id number] 04:07PM Scott Leyva Order Number: [id number]    - Patient Instructions: To schedule this appointment, please contact Central Scheduling at [phone]  Do not wear any perfume, powder, lotion or deodorant on breast or underarm area  Please bring your doctors order, referral (if needed) and insurance information with you on the day of the test  Failure to bring this information may result in this test being rescheduled  Arrive 15 minutes prior to your appointment time to register      On the day of your test, please bring any prior mammogram or breast studies with you that were not performed at a [facility]  Failure to bring prior exams may result in your test needing to be rescheduled  Test Name Result Flag Reference   MAMMO SCREENING BILATERAL W CAD (Report)     Patient History:   Patient is postmenopausal    Family history of breast cancer in maternal cousin, colorectal    cancer in mother  Patient has never smoked  Patient's BMI is 26 2  Reason for exam: screening, asymptomatic  Screening     Mammo Screening Bilateral W CAD: June 20, 2017 - Check In #:    [de-identified]   Bilateral CC and MLO view(s) were taken  Technologist: FRANCO Hernandez (R)(M)   No prior studies available for comparison  The breast tissue is heterogeneously dense, potentially limiting    the sensitivity of mammography  Patient risk, included in this    report, assists in determining the appropriate screening regimen    (such as 3-D mammography or the inclusion of automated breast    ultrasound or MRI)  3-D mammography may also remain indicated as    screening  The parenchymal pattern has a vaguely nodular    appearance  There is a group of calcifications in the outer upper posterior    right breast at approximately the 11 o'clock position, 18 cm from   the nipple  Lateral and spot magnification views recommended  There is, in addition, a 1 4 cm nodular asymmetry in the upper    outer right breast at approximately the 10 o'clock position, 17    cm from the nipple  Further evaluation with 3-D diagnostic    imaging, and possible ultrasound, recommended  No dominant soft tissue mass, architectural distortion or    suspicious calcifications are noted in either breast  The skin    and nipple contours are within normal limits  1  Upper outer posterior right breast calcifications  Lateral    and spot magnification views recommended  2  Upper outer posterior right breast 1 4 cm nodular asymmetry  3-D diagnostic imaging, with possible ultrasound, recommended  3  Unremarkable left mammogram      ACR BI-RADSï¾® Assessments: BiRad:0 - Incomplete: needs additional    imaging evaluation - Right     Recommendation:   Further imaging of the right breast    A breast health care nurse from our facility will be contacting    the patient regarding the need for additional imaging  Analyzed by CAD     The patient is scheduled in a reminder system  8-10% of cancers will be missed on mammography  Management of a    palpable abnormality must be based on clinical grounds  Patients   will be notified of their results via letter from our facility  Accredited by [employer] of Radiology and FDA       Transcription Location: [facility] 98: [id number]     Risk Value(s):   Tyrer-Cuzick 10 Year: 2 400%, Tyrer-Cuzick Lifetime: 4 600%,    Myriad Table: 1 5%, MICHAEL 5 Year: 1 7%, NCI Lifetime: 5 5%   Signed by:   Carlos Hansen MD   7/5/17

## 2018-01-10 NOTE — RESULT NOTES
Verified Results  * DXA BONE DENSITY SPINE HIP AND PELVIS 20Jun2017 04:08PM Angela Jiménez Order Number: [id number]    - Patient Instructions: To schedule this appointment, please contact Central Scheduling at [phone]  Test Name Result Flag Reference   DXA BONE DENSITY SPINE HIP AND PELVIS (Report)     CENTRAL DXA SCAN     CLINICAL HISTORY:  79year old post-menopausal  female  Vitamin D deficiency  TECHNIQUE: Bone densitometry was performed using a Horizon A bone densitometer  Regions of interest appear properly placed  There are no obvious fractures or other confounding variables which could limit the study  COMPARISON: None  RESULTS:    LUMBAR SPINE: L1-L4:   BMD 1 272 gm/cm2   T-score 2 0   Z-score 4 0     LEFT TOTAL HIP:   BMD 1 154 gm/cm2   T-score 1 7   Z-score 3 1     LEFT FEMORAL NECK:   BMD 0 943 gm/cm2   T-score 0 8   Z-score 2 5             IMPRESSION:   1  Based on the [facility] classification, this study is normal and the patient is considered at low risk for fracture  2  A daily intake of calcium of at least 1200 mg and vitamin D, 800-1000 IU, as well as weight bearing and muscle strengthening exercise, fall prevention and avoidance of tobacco and excessive alcohol intake as basic preventive measures are recommended  3  Repeat DXA scan on the same equipment in 18-24 months as clinically indicated  The 10 year risk of hip fracture is 0 1%, with the 10 year risk of major osteoporotic fracture being 5 8%, as calculated by the [facility] fracture risk assessment tool (FRAX)  The current NOF guidelines recommend treating patients with FRAX 10 year risk score   of >3% for hip fracture and >20% for major osteoporotic fracture        WHO CLASSIFICATION:   Normal (a T-score of -1 0 or higher)   Low bone mineral density (a T-score of less than -1 0 but higher than -2 5)   Osteoporosis (a T-score of -2 5 or less)   Severe osteoporosis (a T-score of -2 5 or less with a fragility fracture)             Workstation performed: [id number]     Signed by:   Tee Tiwari MD   6/21/17

## 2018-01-13 VITALS
DIASTOLIC BLOOD PRESSURE: 70 MMHG | SYSTOLIC BLOOD PRESSURE: 110 MMHG | WEIGHT: 186.38 LBS | OXYGEN SATURATION: 96 % | HEART RATE: 99 BPM | TEMPERATURE: 99 F | HEIGHT: 62 IN | BODY MASS INDEX: 34.3 KG/M2

## 2018-01-13 VITALS
OXYGEN SATURATION: 98 % | HEIGHT: 62 IN | BODY MASS INDEX: 34.46 KG/M2 | HEART RATE: 91 BPM | SYSTOLIC BLOOD PRESSURE: 122 MMHG | TEMPERATURE: 98.6 F | WEIGHT: 187.25 LBS | DIASTOLIC BLOOD PRESSURE: 70 MMHG

## 2018-01-14 VITALS
OXYGEN SATURATION: 97 % | SYSTOLIC BLOOD PRESSURE: 104 MMHG | TEMPERATURE: 98.3 F | BODY MASS INDEX: 32.25 KG/M2 | WEIGHT: 182 LBS | DIASTOLIC BLOOD PRESSURE: 70 MMHG | HEART RATE: 93 BPM | HEIGHT: 63 IN

## 2018-01-14 VITALS
TEMPERATURE: 98.1 F | DIASTOLIC BLOOD PRESSURE: 58 MMHG | HEART RATE: 74 BPM | HEIGHT: 63 IN | BODY MASS INDEX: 32.51 KG/M2 | WEIGHT: 183.5 LBS | SYSTOLIC BLOOD PRESSURE: 118 MMHG | OXYGEN SATURATION: 97 %

## 2018-01-15 NOTE — RESULT NOTES
Verified Results  (1) COMPREHENSIVE METABOLIC PANEL 17FUW8477 03:24JF Paty Kolb    Order Number: [id number]     Test Name Result Flag Reference   SODIUM 138 mmol/L  136-145   POTASSIUM 3 0 mmol/L L 3 5-5 3   CHLORIDE 101 mmol/L  100-108   CARBON DIOXIDE 28 mmol/L  21-32   ANION GAP (CALC) 9 mmol/L  4-13   BLOOD UREA NITROGEN 30 mg/dL H 5-25   CREATININE 1 29 mg/dL  0 60-1 30   Standardized to IDMS reference method   CALCIUM 9 5 mg/dL  8 3-10 1   BILI, TOTAL 0 70 mg/dL  0 20-1 00   ALK PHOSPHATAS 104 U/L     ALT (SGPT) 20 U/L  12-78   Specimen collection should occur prior to Sulfasalazine administration due to the potential for falsely depressed results  AST(SGOT) 23 U/L  5-45   Specimen collection should occur prior to Sulfasalazine administration due to the potential for falsely depressed results  ALBUMIN 3 9 g/dL  3 5-5 0   TOTAL PROTEIN 8 9 g/dL H 6 4-8 2   eGFR 50 ml/min/1 73sq m     National Kidney Disease Education Program recommendations are as follows:  GFR calculation is accurate only with a steady state creatinine  Chronic Kidney disease less than 60 ml/min/1 73 sq  meters  Kidney failure less than 15 ml/min/1 73 sq  meters  GLUCOSE FASTING 113 mg/dL H 65-99   Specimen collection should occur prior to Sulfasalazine administration due to the potential for falsely depressed results  Specimen collection should occur prior to Sulfapyridine administration due to the potential for falsely elevated results  (1) HEMOGLOBIN A1C [id number] 09:54AM [employer]    Order Number: [id number]     Test Name Result Flag Reference   HEMOGLOBIN A1C 6 3 %  4 2-6 3   EST  AVG  GLUCOSE 134 mg/dl       (1) TSH 17TYD2215 09:54AM [employer]    Order Number: [id number]     Test Name Result Flag Reference   TSH 0 517 uIU/mL  0 358-3 740   Patients undergoing fluorescein dye angiography may retain small amounts of fluorescein in the body for 48-72 hours post procedure  Samples containing fluorescein can produce falsely depressed TSH values  If the patient had this procedure,a specimen should be resubmitted post fluorescein clearance  The recommended reference ranges for TSH during pregnancy are as follows:  First trimester 0 1 to 2 5 uIU/mL  Second trimester  0 2 to 3 0 uIU/mL  Third trimester 0 3 to 3 0 uIU/m     (1) MICROALBUMIN CREATININE RATIO, RANDOM URINE 28FQN5826 09:54AM Brodie Harish    Order Number: [id number]     Test Name Result Flag Reference   MICROALBUMIN/ CREAT R 8 mg/g creatinine  0-30   MICROALBUMIN,URINE 11 7 mg/L  0 0-20 0   CREATININE URINE 153 0 mg/dL         Plan  Hypokalemia    · Potassium Chloride ER 20 MEQ Oral Tablet Extended Release;  Take 1 tablet daily

## 2018-01-23 NOTE — MISCELLANEOUS
Message     Recorded as Task   Date: 12/04/2017 01:47 PM, Created By: Ramona Chin   Task Name: Call Back   Assigned To: [place] Butler   Regarding Patient: Mary Anne Hendrickson, Status: In Progress   Comment:    Brittanie Zimmerman - 04 Dec 2017 1:47 PM     TASK CREATED  Caller: Self; [phone] (Home)  PLEASE CALL Jony Bad  SHE IS HAVING ISSUES WITH A CONSTANT COUGH AND SHE DOES NOT KNOW IF IT IS FROM POTASSIUM OR MAGNESIUM THAT YOU GAVE HER AND WOULD LIKE TO SPEAK WITH YOU  Phil [place]!!   Jeison Munson - 07 Dec 2017 4:38 PM     TASK REPLIED TO: Previously Assigned To [facility],team                      Called to d/w patient  [facility] requesting return call to [facility]  No meds added should cause cough  Will need to discuss cough further with patient  Jeison Munson - 07 Dec 2017 4:55 PM     TASK IN PROGRESS   Jeison Munson - 11 Dec 2017 12:36 PM     TASK REASSIGNED: Previously Assigned To [facility] pa,team  Called to discuss further  LMOM requesting return call to [facility] to see if cough resolved  See message blow  Thanks Pushpa Tovar - 82 Dec 2017 1:08 PM     TASK REASSIGNED: Previously Assigned To Rigoberto Hoang - 12 Dec 2017 12:41 PM     TASK EDITED  Message left on [phone] requesting a call back to discuss  Radha Lieberman - 14 Dec 2017 2:24 PM     TASK EDITED  Message left on [phone]   requesting a call back to discuss  Radha Lieberman - 15 Dec 2017 1:55 PM     TASK EDITED  Attempted to reach patient x 2 today on [phone]  No answer  I did not leave another message   [place] Butler - 18 Dec 2017 9:53 AM     TASK EDITED  Called and spoke with the patient  She did not have access to her phone temporarily and just got the message this weekend  She stated that she will call the office if the cough persists  She verbalized understanding that further assessment and possible testing may be needed  Active Problems    1   Abnormal mammogram of right breast (793 80) (R92 8)   2  Advance directive in chart (V49 89) (Z78 9)   3  Allergic cough (786 2) (R05)   4  Allergic rhinitis (477 9) (J30 9)   5  Benign essential HTN (401 1) (I10)   6  Depression (311) (F32 9)   7  DJD (degenerative joint disease) of knee (715 36) (M17 10)   8  Dyslipidemia (272 4) (E78 5)   9  Hypokalemia (276 8) (E87 6)   10  Hypomagnesemia (275 2) (E83 42)   11  Need for influenza vaccination (V04 81) (Z23)   12  Osteoarthritis of right knee (715 96) (M17 11)   13  Postmenopausal (V49 81) (Z78 0)   14  Screening for genitourinary condition (V81 6) (Z13 89)   15  Screening for neurological condition (V80 09) (Z13 89)   16  Skin lesion of cheek (709 9) (L98 9)   17  Tremor (781 0) (R25 1)   18  Type 2 diabetes mellitus (250 00) (E11 9)   19  Vitamin D deficiency (268 9) (E55 9)    Current Meds   1  AmLODIPine Besylate 10 MG Oral Tablet; Take 1 tablet daily  Requested for: 60OXG5401;   Last Rx:00Lcy9474 Ordered   2  Aspirin 81 MG TABS; Take 1 tablet daily; Therapy: (Recorded:14Jan2015) to Recorded   3  Atorvastatin Calcium 40 MG Oral Tablet; TAKE 1 TABLET DAILY  Requested for:   53MHD9992; Last Rx:86Trx7309 Ordered   4  Cetirizine HCl - 10 MG Oral Tablet; Take 1 tablet daily  Requested for: 32CCY3219; Last   Rx:89Brx0732 Ordered   5  FLUoxetine HCl - 20 MG Oral Tablet; TAKE 1 TABLET DAILY  Requested for: 99XUR8007;   Last Rx:35Jyj7003 Ordered   6  Fluticasone Propionate 50 MCG/ACT Nasal Suspension (Flonase); USE 1 SPRAY IN   EACH NOSTRIL ONCE DAILY; Therapy: 77HFM9979 to (Last YJ:97VJI8398)  Requested for: 09JYJ7468 Ordered   7  HydroCHLOROthiazide 25 MG Oral Tablet; TAKE 1 TABLET DAILY; Therapy: 40NHC8762 to (Evaluate:02Apr2018)  Requested for: 51KXZ1079; Last   Rx:19Rkn8914 Ordered   8  Losartan Potassium 100 MG Oral Tablet; Take 1 tablet daily  Requested for: 24HTJ3423;   Last Rx:05Oct2017 Ordered   9  Magnesium Oxide 400 MG Oral Tablet; TAKE 1 TABLET DAILY;    Therapy: 94MIV7294 to (Evaluate:86Qfq3608)  Requested for: 09WLZ0623; Last   Rx:17Nov2017 Ordered   10  Potassium Chloride ER 20 MEQ Oral Tablet Extended Release; Take 1 tablet daily; Therapy: 89UNZ5073 to (Last Rx:10Nov2017)  Requested for: 78HSF7544 Ordered   11  ProAir  (90 Base) MCG/ACT Inhalation Aerosol Solution; INHALE 1 TO 2 PUFFS    EVERY 4 TO 6 HOURS AS NEEDED; Therapy: 93Zpm8798 to (Last Rx:11Apr2017)  Requested for: 38Kze5454 Ordered   12  Vitamin D3 2000 UNIT Oral Capsule; take 1 capsule daily; Therapy: 42ZOV8146 to (Last Rx:04Jun2015)  Requested for: 11FVG3101 Ordered   13  Zostavax 35069 UNT/0 65ML Subcutaneous Solution Reconstituted (Zostavax 32011    UNT/0 65ML Subcutaneous Solution Reconstituted); as directed; Therapy: 45AOF6915 to (Last Rx:14Jan2015) Ordered    Allergies    1   Lisinopril TABS    2  Seasonal    Signatures   Electronically signed by : Ross Leija RN; Dec 18 2017  9:56AM EST                       (Author)

## 2018-01-23 NOTE — MISCELLANEOUS
Message   Recorded as Task   Date: 12/14/2017 11:50 AM, Created By: Brady Snyder   Task Name: Follow Up   Assigned To: Tu Vallejo   Regarding Patient: Ricardo Daugherty, Status: Active   Comment:    Jeison Munson - 14 Dec 2017 11:50 AM     TASK CREATED  Please contact patient  NEeds repeat dx mammogram in 6 months 6/13/18 to follow pu 12/12/17 mammogram   I LMOM requesting return call to [facility] and palced order  Please see that she is aware of need for follow up mammogram michael Sumner Friday - 14 Dec 2017 11:55 AM     TASK REASSIGNED: Previously Assigned To [facility],team   Radha Lieberman - 14 Dec 2017 2:25 PM     TASK EDITED  Message left on [phone]  requesting a call back to discuss  Radha Lieberman - 15 Dec 2017 1:55 PM     TASK EDITED  Attempted to reach patient x 2 today on [phone]  No answer  I did not leave another message  Radha Lieberman - 18 Dec 2017 9:50 AM     TASK EDITED  Called and spoke with the patient  She stated that she did not have access to her phone temporarily  She is aware of the need for a follow up diagnostic mammogram in 6 months  She will call in March to schedule  Active Problems    1  Abnormal mammogram of right breast (793 80) (R92 8)   2  Advance directive in chart (V49 89) (Z78 9)   3  Allergic cough (786 2) (R05)   4  Allergic rhinitis (477 9) (J30 9)   5  Benign essential HTN (401 1) (I10)   6  Depression (311) (F32 9)   7  DJD (degenerative joint disease) of knee (715 36) (M17 10)   8  Dyslipidemia (272 4) (E78 5)   9  Hypokalemia (276 8) (E87 6)   10  Hypomagnesemia (275 2) (E83 42)   11  Need for influenza vaccination (V04 81) (Z23)   12  Osteoarthritis of right knee (715 96) (M17 11)   13  Postmenopausal (V49 81) (Z78 0)   14  Screening for genitourinary condition (V81 6) (Z13 89)   15  Screening for neurological condition (V80 09) (Z13 89)   16  Skin lesion of cheek (709 9) (L98 9)   17   Tremor (781 0) (R25 1) 18  Type 2 diabetes mellitus (250 00) (E11 9)   19  Vitamin D deficiency (268 9) (E55 9)    Current Meds   1  AmLODIPine Besylate 10 MG Oral Tablet; Take 1 tablet daily  Requested for: 95QOR5811;   Last Rx:64Xvv8880 Ordered   2  Aspirin 81 MG TABS; Take 1 tablet daily; Therapy: (Recorded:14Jan2015) to Recorded   3  Atorvastatin Calcium 40 MG Oral Tablet; TAKE 1 TABLET DAILY  Requested for:   25UZB7203; Last Rx:05Oct2017 Ordered   4  Cetirizine HCl - 10 MG Oral Tablet; Take 1 tablet daily  Requested for: 04TXG5713; Last   Rx:33Uvs6473 Ordered   5  FLUoxetine HCl - 20 MG Oral Tablet; TAKE 1 TABLET DAILY  Requested for: 80GMG1761;   Last Rx:05Oct2017 Ordered   6  Fluticasone Propionate 50 MCG/ACT Nasal Suspension (Flonase); USE 1 SPRAY IN   EACH NOSTRIL ONCE DAILY; Therapy: 14DLH0526 to (Last IF:16MPS5509)  Requested for: 59QHZ0926 Ordered   7  HydroCHLOROthiazide 25 MG Oral Tablet; TAKE 1 TABLET DAILY; Therapy: 46KMZ3233 to (Evaluate:02Apr2018)  Requested for: 00XCD3903; Last   Rx:05Oct2017 Ordered   8  Losartan Potassium 100 MG Oral Tablet; Take 1 tablet daily  Requested for: 03DMB9833;   Last Rx:05Oct2017 Ordered   9  Magnesium Oxide 400 MG Oral Tablet; TAKE 1 TABLET DAILY; Therapy: 89AAM0838 to (Evaluate:49Usg2323)  Requested for: 86KAS6885; Last   Rx:17Nov2017 Ordered   10  Potassium Chloride ER 20 MEQ Oral Tablet Extended Release; Take 1 tablet daily; Therapy: 67EXX0462 to (Last Rx:10Nov2017)  Requested for: 65IZS0292 Ordered   11  ProAir  (90 Base) MCG/ACT Inhalation Aerosol Solution; INHALE 1 TO 2 PUFFS    EVERY 4 TO 6 HOURS AS NEEDED; Therapy: 30Qaa7786 to (Last Rx:62Zyj1700)  Requested for: 27Awi2620 Ordered   12  Vitamin D3 2000 UNIT Oral Capsule; take 1 capsule daily; Therapy: 22FZC4436 to (Last Rx:04Jun2015)  Requested for: 88WAJ2107 Ordered   13   Zostavax 81926 UNT/0 65ML Subcutaneous Solution Reconstituted (Zostavax 40132    UNT/0 65ML Subcutaneous Solution Reconstituted); as directed; Therapy: 45VCJ1310 to (Last Rx:14Jan2015) Ordered    Allergies    1   Lisinopril TABS    2  Seasonal    Signatures   Electronically signed by : Malvin Fountain RN; Dec 18 2017  9:51AM EST                       (Author)

## 2018-04-13 RX ORDER — LOSARTAN POTASSIUM 100 MG/1
TABLET ORAL
Qty: 30 TABLET | Refills: 0 | OUTPATIENT
Start: 2018-04-13

## 2018-04-13 RX ORDER — FLUOXETINE 20 MG/1
TABLET, FILM COATED ORAL
Qty: 30 TABLET | Refills: 0 | OUTPATIENT
Start: 2018-04-13

## 2018-04-13 RX ORDER — HYDROCHLOROTHIAZIDE 25 MG/1
TABLET ORAL
Qty: 30 TABLET | Refills: 0 | OUTPATIENT
Start: 2018-04-13

## 2018-04-13 RX ORDER — AMLODIPINE BESYLATE 10 MG/1
TABLET ORAL
Qty: 30 TABLET | Refills: 0 | OUTPATIENT
Start: 2018-04-13

## 2018-04-18 ENCOUNTER — TELEPHONE (OUTPATIENT)
Dept: INTERNAL MEDICINE CLINIC | Facility: CLINIC | Age: 68
End: 2018-04-18

## 2018-04-18 DIAGNOSIS — E78.5 DYSLIPIDEMIA: ICD-10-CM

## 2018-04-18 DIAGNOSIS — I10 ESSENTIAL HYPERTENSION, BENIGN: Primary | ICD-10-CM

## 2018-04-18 DIAGNOSIS — F41.8 DEPRESSION WITH ANXIETY: ICD-10-CM

## 2018-04-18 RX ORDER — FLUTICASONE PROPIONATE 50 MCG
1 SPRAY, SUSPENSION (ML) NASAL AS NEEDED
COMMUNITY
Start: 2015-06-04 | End: 2020-04-01 | Stop reason: SDUPTHER

## 2018-04-18 RX ORDER — LOSARTAN POTASSIUM 100 MG/1
1 TABLET ORAL DAILY
COMMUNITY
End: 2018-04-18 | Stop reason: SDUPTHER

## 2018-04-18 RX ORDER — HYDROCHLOROTHIAZIDE 25 MG/1
1 TABLET ORAL DAILY
COMMUNITY
Start: 2015-05-13 | End: 2018-04-18 | Stop reason: SDUPTHER

## 2018-04-18 RX ORDER — AMLODIPINE BESYLATE 10 MG/1
1 TABLET ORAL DAILY
COMMUNITY
End: 2018-04-18 | Stop reason: SDUPTHER

## 2018-04-18 RX ORDER — ACETAMINOPHEN 160 MG
1 TABLET,DISINTEGRATING ORAL DAILY
COMMUNITY
Start: 2015-06-04

## 2018-04-18 RX ORDER — ATORVASTATIN CALCIUM 40 MG/1
40 TABLET, FILM COATED ORAL DAILY
Qty: 30 TABLET | Refills: 0 | Status: SHIPPED | OUTPATIENT
Start: 2018-04-18 | End: 2018-05-17 | Stop reason: SDUPTHER

## 2018-04-18 RX ORDER — LOSARTAN POTASSIUM 100 MG/1
100 TABLET ORAL DAILY
Qty: 30 TABLET | Refills: 0 | Status: SHIPPED | OUTPATIENT
Start: 2018-04-18 | End: 2018-05-17 | Stop reason: SDUPTHER

## 2018-04-18 RX ORDER — FLUOXETINE 20 MG/1
1 TABLET, FILM COATED ORAL DAILY
COMMUNITY
End: 2018-04-18 | Stop reason: SDUPTHER

## 2018-04-18 RX ORDER — CETIRIZINE HYDROCHLORIDE 10 MG/1
1 TABLET ORAL DAILY
COMMUNITY
End: 2019-06-04 | Stop reason: SDUPTHER

## 2018-04-18 RX ORDER — HYDROCHLOROTHIAZIDE 25 MG/1
25 TABLET ORAL DAILY
Qty: 30 TABLET | Refills: 0 | Status: SHIPPED | OUTPATIENT
Start: 2018-04-18 | End: 2018-05-17 | Stop reason: SDUPTHER

## 2018-04-18 RX ORDER — ATORVASTATIN CALCIUM 40 MG/1
1 TABLET, FILM COATED ORAL DAILY
COMMUNITY
End: 2018-04-18 | Stop reason: SDUPTHER

## 2018-04-18 RX ORDER — FLUOXETINE 20 MG/1
20 TABLET, FILM COATED ORAL DAILY
Qty: 30 TABLET | Refills: 0 | Status: SHIPPED | OUTPATIENT
Start: 2018-04-18 | End: 2018-05-17 | Stop reason: SDUPTHER

## 2018-04-18 RX ORDER — POTASSIUM CHLORIDE 1500 MG/1
1 TABLET, FILM COATED, EXTENDED RELEASE ORAL DAILY
COMMUNITY
Start: 2017-11-07 | End: 2018-07-13 | Stop reason: SDUPTHER

## 2018-04-18 RX ORDER — AMLODIPINE BESYLATE 10 MG/1
10 TABLET ORAL DAILY
Qty: 30 TABLET | Refills: 0 | Status: SHIPPED | OUTPATIENT
Start: 2018-04-18 | End: 2018-05-17 | Stop reason: SDUPTHER

## 2018-04-18 RX ORDER — MAGNESIUM OXIDE 400 MG/1
1 TABLET ORAL DAILY
COMMUNITY
Start: 2017-11-17 | End: 2018-05-17 | Stop reason: SDUPTHER

## 2018-04-18 RX ORDER — ALBUTEROL SULFATE 90 UG/1
2 AEROSOL, METERED RESPIRATORY (INHALATION) AS NEEDED
COMMUNITY
Start: 2017-04-11 | End: 2019-06-04 | Stop reason: SDUPTHER

## 2018-04-18 NOTE — TELEPHONE ENCOUNTER
Last appt 11/10/2017    Next appt, none pending    30 day supply given with no refills  Pt must be seen in order to be given additional refills

## 2018-04-18 NOTE — TELEPHONE ENCOUNTER
Please call and schedule f/u appt in the next 30 days or no further refills will be given  Thank you!

## 2018-04-18 NOTE — TELEPHONE ENCOUNTER
Austin Chirinos called and stated that she is living in [place] and her granddaughter picks her up to take her to the appts but is currently out of her medication and needs refills on the following; Losartan 100 Mg, Fluoxetine 20mg, Atorvastatin 40mg, Hydrochlorothiazide 25 mg, Amloditinebesylate 10mg  Please send to the [facility];[phone]  If there is any issues w/ refilling this medication please call patient and let her know  She stated that she had called on Friday and didn't get a response back about anything  Thank you

## 2018-04-18 NOTE — TELEPHONE ENCOUNTER
PT CALLED OFFICE SHE WILL CALL BACK TO SCHEDULE BC SHE NEEDS TO FIND A RIDE SINCE SHE LIVES IN [place]

## 2018-05-15 DIAGNOSIS — E78.5 DYSLIPIDEMIA: ICD-10-CM

## 2018-05-15 RX ORDER — ATORVASTATIN CALCIUM 40 MG/1
TABLET, FILM COATED ORAL
Qty: 30 TABLET | Refills: 0 | OUTPATIENT
Start: 2018-05-15

## 2018-05-17 ENCOUNTER — OFFICE VISIT (OUTPATIENT)
Dept: INTERNAL MEDICINE CLINIC | Facility: CLINIC | Age: 68
End: 2018-05-17
Payer: COMMERCIAL

## 2018-05-17 VITALS
DIASTOLIC BLOOD PRESSURE: 84 MMHG | HEIGHT: 63 IN | WEIGHT: 189.2 LBS | OXYGEN SATURATION: 96 % | BODY MASS INDEX: 33.52 KG/M2 | TEMPERATURE: 98.6 F | HEART RATE: 81 BPM | SYSTOLIC BLOOD PRESSURE: 128 MMHG

## 2018-05-17 DIAGNOSIS — I10 ESSENTIAL HYPERTENSION, BENIGN: ICD-10-CM

## 2018-05-17 DIAGNOSIS — I10 BENIGN ESSENTIAL HTN: ICD-10-CM

## 2018-05-17 DIAGNOSIS — E11.9 TYPE 2 DIABETES MELLITUS WITHOUT COMPLICATION, WITHOUT LONG-TERM CURRENT USE OF INSULIN (HCC): Primary | ICD-10-CM

## 2018-05-17 DIAGNOSIS — Z23 NEED FOR SHINGLES VACCINE: ICD-10-CM

## 2018-05-17 DIAGNOSIS — E78.5 DYSLIPIDEMIA: ICD-10-CM

## 2018-05-17 DIAGNOSIS — E83.42 HYPOMAGNESEMIA: ICD-10-CM

## 2018-05-17 DIAGNOSIS — R92.8 ABNORMAL MAMMOGRAM: ICD-10-CM

## 2018-05-17 DIAGNOSIS — F41.8 DEPRESSION WITH ANXIETY: ICD-10-CM

## 2018-05-17 DIAGNOSIS — Z11.59 NEED FOR HEPATITIS C SCREENING TEST: ICD-10-CM

## 2018-05-17 DIAGNOSIS — K08.9 POOR DENTITION: ICD-10-CM

## 2018-05-17 DIAGNOSIS — Z12.11 SCREENING FOR COLON CANCER: ICD-10-CM

## 2018-05-17 PROBLEM — J30.9 ALLERGIC RHINITIS: Status: ACTIVE | Noted: 2017-04-11

## 2018-05-17 PROBLEM — R05.8 ALLERGIC COUGH: Status: RESOLVED | Noted: 2017-04-11 | Resolved: 2018-05-17

## 2018-05-17 PROBLEM — L98.9 SKIN LESION OF CHEEK: Status: ACTIVE | Noted: 2017-11-10

## 2018-05-17 PROBLEM — R25.1 TREMOR: Status: ACTIVE | Noted: 2017-11-10

## 2018-05-17 PROBLEM — R05.8 ALLERGIC COUGH: Status: ACTIVE | Noted: 2017-04-11

## 2018-05-17 PROCEDURE — 99214 OFFICE O/P EST MOD 30 MIN: CPT | Performed by: PHYSICIAN ASSISTANT

## 2018-05-17 PROCEDURE — 4010F ACE/ARB THERAPY RXD/TAKEN: CPT | Performed by: PHYSICIAN ASSISTANT

## 2018-05-17 PROCEDURE — 1101F PT FALLS ASSESS-DOCD LE1/YR: CPT | Performed by: PHYSICIAN ASSISTANT

## 2018-05-17 RX ORDER — LOSARTAN POTASSIUM 100 MG/1
100 TABLET ORAL DAILY
Qty: 30 TABLET | Refills: 5 | Status: SHIPPED | OUTPATIENT
Start: 2018-05-17 | End: 2018-11-10 | Stop reason: SDUPTHER

## 2018-05-17 RX ORDER — ATORVASTATIN CALCIUM 40 MG/1
40 TABLET, FILM COATED ORAL DAILY
Qty: 30 TABLET | Refills: 5 | Status: SHIPPED | OUTPATIENT
Start: 2018-05-17 | End: 2018-11-10 | Stop reason: SDUPTHER

## 2018-05-17 RX ORDER — MAGNESIUM OXIDE 400 MG/1
1 TABLET ORAL DAILY
Qty: 30 TABLET | Refills: 5 | Status: SHIPPED | OUTPATIENT
Start: 2018-05-17 | End: 2018-12-21

## 2018-05-17 RX ORDER — AMOXICILLIN 500 MG/1
500 CAPSULE ORAL EVERY 8 HOURS SCHEDULED
Qty: 30 CAPSULE | Refills: 0 | Status: SHIPPED | OUTPATIENT
Start: 2018-05-17 | End: 2018-05-27

## 2018-05-17 RX ORDER — HYDROCHLOROTHIAZIDE 25 MG/1
25 TABLET ORAL DAILY
Qty: 30 TABLET | Refills: 5 | Status: SHIPPED | OUTPATIENT
Start: 2018-05-17 | End: 2018-11-10 | Stop reason: SDUPTHER

## 2018-05-17 RX ORDER — FLUOXETINE 20 MG/1
20 TABLET, FILM COATED ORAL DAILY
Qty: 30 TABLET | Refills: 5 | Status: SHIPPED | OUTPATIENT
Start: 2018-05-17 | End: 2018-11-10 | Stop reason: SDUPTHER

## 2018-05-17 RX ORDER — AMLODIPINE BESYLATE 10 MG/1
10 TABLET ORAL DAILY
Qty: 30 TABLET | Refills: 5 | Status: SHIPPED | OUTPATIENT
Start: 2018-05-17 | End: 2018-11-10 | Stop reason: SDUPTHER

## 2018-05-17 NOTE — ASSESSMENT & PLAN NOTE
Previous diagnostic mammogram 12/12/2017 recommending follow-up bilateral diagnostic mammogram 6/12/2018  Order given today for bilateral diagnostic mammogram patient will call and schedule

## 2018-05-17 NOTE — ASSESSMENT & PLAN NOTE
Patient with poor dentition and dental pain no palpable abscess  Overdue for her dental care follow-up  Recommended she call and schedule with the dentist for further evaluation  No signs or symptoms of abscess  Will treat with  Amoxicillin 500 mg by mouth every 8 hr for 10 days  Complete entire course  Continue with good oral hygiene

## 2018-05-17 NOTE — ASSESSMENT & PLAN NOTE
Encouraged diabetic diet  Daily activity  Weight loss  Which can help to improve blood sugars  Patient has had Prevnar 13 as well as Pneumovax 23  She was recommended to get a yearly flu shot which she has been getting  Will check A1c with next labs that  Urine MCR current  Taking Arb  Schedule DM eye exam   Food exam done today

## 2018-05-17 NOTE — ASSESSMENT & PLAN NOTE
Patient previously had fit test 03/2017 which is negative  Will repeat that test order given today and supplies  Patient will take to lab

## 2018-05-17 NOTE — PATIENT INSTRUCTIONS
Poor dentition  Patient with poor dentition and dental pain no palpable abscess  Overdue for her dental care follow-up  Recommended she call and schedule with the dentist for further evaluation  No signs or symptoms of abscess  Will treat with  Amoxicillin 500 mg by mouth every 8 hr for 10 days  Complete entire course  Continue with good oral hygiene  Type 2 diabetes mellitus without complication, without long-term current use of insulin ([address] )   Encouraged diabetic diet  Daily activity  Weight loss  Which can help to improve blood sugars  Patient has had Prevnar 13 as well as Pneumovax 23  She was recommended to get a yearly flu shot which she has been getting  Will check A1c with next labs that  Urine MCR current  Taking Arb  Schedule DM eye exam   Food exam done today  Benign essential HTN    Well controlled at this time on medications  No dose change  Continue same meds    Abnormal mammogram   Previous diagnostic mammogram 12/12/2017 recommending follow-up bilateral diagnostic mammogram 6/12/2018  Order given today for bilateral diagnostic mammogram patient will call and schedule  Dyslipidemia    Stable on statin  No dose change    Hypomagnesemia   Taking Mag supplement  Will follow Mag level    Screening for colon cancer   Patient previously had fit test 03/2017 which is negative  Will repeat that test order given today and supplies  Patient will take to lab  Need for shingles vaccine  Advised shingles shot  Patient will check with her insurance and local pharmacy  Body mass index (BMI) of 33 0-33 9 in adult  Encouraged weight loss

## 2018-05-17 NOTE — PROGRESS NOTES
Diabetic Foot Exam    Patient's shoes and socks removed  Right Foot/Ankle   Right Foot Inspection  Skin Exam: skin normal and skin intact no dry skin, no warmth, no callus, no erythema, no maceration, no abnormal color, no pre-ulcer, no ulcer and no callus                            Sensory       Monofilament testing: intact  Vascular    The right DP pulse is 2+  Left Foot/Ankle  Left Foot Inspection  Skin Exam: skin normal and skin intactno dry skin, no warmth, no erythema, no maceration, normal color, no pre-ulcer, no ulcer and no callus                                         Sensory       Monofilament: intact  Vascular    The left DP pulse is 2+  Assign Risk Category:  No deformity present; No loss of protective sensation;  No weak pulses       Risk: 0

## 2018-05-17 NOTE — PROGRESS NOTES
[address] [facility]  Standard Office Visit  Patient ID: Emmanuel Cancino    : 1950  Age/Gender: 76 y o  female     DATE: 2018      Assessment/Plan:    Poor dentition  Patient with poor dentition and dental pain no palpable abscess  Overdue for her dental care follow-up  Recommended she call and schedule with the dentist for further evaluation  No signs or symptoms of abscess  Will treat with  Amoxicillin 500 mg by mouth every 8 hr for 10 days  Complete entire course  Continue with good oral hygiene  Type 2 diabetes mellitus without complication, without long-term current use of insulin ([address] )   Encouraged diabetic diet  Daily activity  Weight loss  Which can help to improve blood sugars  Patient has had Prevnar 13 as well as Pneumovax 23  She was recommended to get a yearly flu shot which she has been getting  Will check A1c with next labs that  Urine MCR current  Taking Arb  Schedule DM eye exam   Food exam done today  Benign essential HTN    Well controlled at this time on medications  No dose change  Continue same meds    Abnormal mammogram   Previous diagnostic mammogram 2017 recommending follow-up bilateral diagnostic mammogram 2018  Order given today for bilateral diagnostic mammogram patient will call and schedule  Dyslipidemia    Stable on statin  No dose change    Hypomagnesemia   Taking Mag supplement  Will follow Mag level    Screening for colon cancer   Patient previously had fit test 2017 which is negative  Will repeat that test order given today and supplies  Patient will take to lab  Need for shingles vaccine  Advised shingles shot  Patient will check with her insurance and local pharmacy  Body mass index (BMI) of 33 0-33 9 in adult  Encouraged weight loss         Diagnoses and all orders for this visit:    Type 2 diabetes mellitus without complication, without long-term current use of insulin ([facility] [address] )  -     Comprehensive metabolic panel; Future  -     CBC and differential; Future  -     HEMOGLOBIN A1C W/ EAG ESTIMATION; Future  -     Lipid Panel with Direct LDL reflex; Future    Depression with anxiety  -     FLUoxetine (PROzac) 20 MG tablet; Take 1 tablet (20 mg total) by mouth daily    Dyslipidemia  -     atorvastatin (LIPITOR) 40 mg tablet; Take 1 tablet (40 mg total) by mouth daily  -     Lipid Panel with Direct LDL reflex; Future    Essential hypertension, benign  -     amLODIPine (NORVASC) 10 mg tablet; Take 1 tablet (10 mg total) by mouth daily  -     hydrochlorothiazide (HYDRODIURIL) 25 mg tablet; Take 1 tablet (25 mg total) by mouth daily  -     losartan (COZAAR) 100 MG tablet; Take 1 tablet (100 mg total) by mouth daily  -     Comprehensive metabolic panel; Future  -     CBC and differential; Future  -     HEMOGLOBIN A1C W/ EAG ESTIMATION; Future    Benign essential HTN  -     Comprehensive metabolic panel; Future  -     CBC and differential; Future  -     HEMOGLOBIN A1C W/ EAG ESTIMATION; Future    Screening for colon cancer  -     Occult Bloood,Fecal Immunochemical; Future    Abnormal mammogram  -     Mammo diagnostic bilateral w cad; Future    Hypomagnesemia  -     Magnesium; Future  -     magnesium oxide (MAG-OX) 400 mg tablet; Take 1 tablet (400 mg total) by mouth daily    Body mass index (BMI) of 33 0-33 9 in adult    Poor dentition  -     amoxicillin (AMOXIL) 500 mg capsule; Take 1 capsule (500 mg total) by mouth every 8 (eight) hours for 10 days    Need for hepatitis C screening test  -     Hepatitis C antibody; Future    Need for shingles vaccine          Subjective:   Chief Complaint   Patient presents with    Follow-up     diabetes, hypertension and depression, no recent labs,  refill meds         Ashley Alves is a 76 y o  female who presents to the office on 5/17/2018 for      Follow-up    She had diagnostic mammogram December of 2017 and notes she may be due for a follow-up  Had a DEXA scan previously  Has not had colonoscopy but has previously had fit test   Taking medications as directed  No complaints or concerns regarding medications  Does not check blood sugars  Notes he has mild muscular spasms in her neck improves with stretches and range-of-motion exercises  Has poor dentition and has not been to a dentist in some time  Notes her teeth are painful when eating at times  No palpable swelling in her gum line  No fevers  Able to eat and drink  Note she is overdue for her diabetic eye exam      Hypertension   This is a chronic problem  The current episode started more than 1 year ago  The problem is unchanged  The problem is controlled  Associated symptoms include anxiety  Pertinent negatives include no chest pain, headaches, malaise/fatigue, palpitations, peripheral edema or shortness of breath  There is no history of angina or CAD/MI  Diabetes   She presents for her follow-up diabetic visit  She has type 2 diabetes mellitus  Her disease course has been stable  Pertinent negatives for hypoglycemia include no confusion, dizziness or headaches  Pertinent negatives for diabetes include no chest pain, no foot ulcerations and no weight loss  Symptoms are stable  Current diabetic treatment includes diet  She is following a generally healthy diet  She rarely participates in exercise  An ACE inhibitor/angiotensin II receptor blocker is being taken  She does not see a podiatrist Eye exam current: Overdue  Dental Pain    This is a chronic problem  The current episode started more than 1 month ago  The problem has been waxing and waning  The patient is experiencing no pain  Pertinent negatives include no fever         The following portions of the patient's history were reviewed and updated as appropriate: allergies, current medications, past family history, past medical history, past social history, past surgical history and problem list     Review of Systems Constitutional: Negative for fever, malaise/fatigue and weight loss  Respiratory: Negative for cough, shortness of breath and wheezing  Cardiovascular: Negative for chest pain and palpitations  Gastrointestinal: Negative for abdominal pain, diarrhea, nausea and vomiting  Note she is not interested in colonoscopy at this time  She is willing to get fit testing   Genitourinary: Negative for dysuria  Skin: Negative for rash  Neurological: Negative for dizziness, light-headedness and headaches  Psychiatric/Behavioral: Negative for agitation, behavioral problems and confusion           Patient Active Problem List   Diagnosis    Abnormal mammogram of right breast    Allergic rhinitis    Benign essential HTN    Depression    DJD (degenerative joint disease) of knee    Dyslipidemia    Hypomagnesemia    Osteoarthritis of right knee    Skin lesion of cheek    Tremor    Type 2 diabetes mellitus without complication, without long-term current use of insulin (HCC)    Abnormal mammogram    Screening for colon cancer    Poor dentition    Body mass index (BMI) of 33 0-33 9 in adult    Need for shingles vaccine       Past Medical History:   Diagnosis Date    Anxiety     Arthritis     B-complex deficiency     Glaucoma     Gout     Hypercalcemia     Hyperlipidemia     Last assessed: 8/5/15    Irregular heart beat     Memory loss     Palpitations     Suicidal ideation        Past Surgical History:   Procedure Laterality Date    TUBAL LIGATION           Current Outpatient Prescriptions:     albuterol (PROAIR HFA) 90 mcg/act inhaler, Inhale 2 puffs, Disp: , Rfl:     amLODIPine (NORVASC) 10 mg tablet, Take 1 tablet (10 mg total) by mouth daily, Disp: 30 tablet, Rfl: 5    aspirin 81 MG tablet, Take 1 tablet by mouth daily, Disp: , Rfl:     atorvastatin (LIPITOR) 40 mg tablet, Take 1 tablet (40 mg total) by mouth daily, Disp: 30 tablet, Rfl: 5    cetirizine (ZyrTEC) 10 mg tablet, Take 1 tablet by mouth daily, Disp: , Rfl:     Cholecalciferol (VITAMIN D3) 2000 units capsule, Take 1 capsule by mouth daily, Disp: , Rfl:     FLUoxetine (PROzac) 20 MG tablet, Take 1 tablet (20 mg total) by mouth daily, Disp: 30 tablet, Rfl: 5    fluticasone (FLONASE) 50 mcg/act nasal spray, 1 spray into each nostril daily, Disp: , Rfl:     hydrochlorothiazide (HYDRODIURIL) 25 mg tablet, Take 1 tablet (25 mg total) by mouth daily, Disp: 30 tablet, Rfl: 5    losartan (COZAAR) 100 MG tablet, Take 1 tablet (100 mg total) by mouth daily, Disp: 30 tablet, Rfl: 5    magnesium oxide (MAG-OX) 400 mg tablet, Take 1 tablet (400 mg total) by mouth daily, Disp: 30 tablet, Rfl: 5    Potassium Chloride ER 20 MEQ TBCR, Take 1 tablet by mouth daily, Disp: , Rfl:     amoxicillin (AMOXIL) 500 mg capsule, Take 1 capsule (500 mg total) by mouth every 8 (eight) hours for 10 days, Disp: 30 capsule, Rfl: 0    Allergies   Allergen Reactions    Lisinopril Cough    Other      Seasonal allergies       Social History     Social History    Marital status: /Civil Union     Spouse name: N/A    Number of children: N/A    Years of education: N/A     Social History Main Topics    Smoking status: Never Smoker    Smokeless tobacco: Never Used    Alcohol use No    Drug use: No    Sexual activity: Not Asked     Other Topics Concern    None     Social History Narrative    Advance directive in chart           Family History   Problem Relation Age of Onset    Hypertension Mother      Benign Essential    Cancer Mother     Hypertension Father      Benign Essential    Cancer Father     Diabetes Sister     Cirrhosis Daughter      Hepatic    Hepatitis Daughter      Type C Viral       PHQ-9 Depression Screening    PHQ-9:    Frequency of the following problems over the past two weeks:       Little interest or pleasure in doing things:  0 - not at all  Feeling down, depressed, or hopeless:  0 - not at all  PHQ-2 Score:  0 Health Maintenance   Topic Date Due    Hepatitis C Screening  1950    COLONOSCOPY  1950    OPHTHALMOLOGY EXAM  05/02/1960    DTaP,Tdap,and Td Vaccines (1 - Tdap) 05/02/1971    PNEUMOCOCCAL POLYSACCHARIDE VACCINE AGE 65 AND OVER  05/02/2015    GLAUCOMA SCREENING 67+ YR  05/02/2017    HEMOGLOBIN A1C  05/07/2018    INFLUENZA VACCINE  09/01/2018    URINE MICROALBUMIN  11/07/2018    Diabetic Foot Exam  11/10/2018    Fall Risk  05/17/2019    Depression Screening PHQ-9  05/17/2019    Urinary Incontinence Screening  05/17/2019       Immunization History   Administered Date(s) Administered    Influenza 11/19/2012, 10/31/2013, 01/14/2015, 03/09/2016, 03/15/2017, 11/10/2017    Influenza Quadrivalent Preservative Free 3 years and older IM 01/14/2015    Influenza Split High Dose Preservative Free IM 03/09/2016, 03/15/2017, 11/10/2017    Pneumococcal Conjugate 13-Valent 07/29/2016    Pneumococcal Polysaccharide PPV23 11/19/2012        Objective:  Vitals:    05/17/18 1834   BP: 128/84   BP Location: Left arm   Patient Position: Sitting   Cuff Size: Standard   Pulse: 81   Temp: 98 6 °F (37 °C)   TempSrc: Oral   SpO2: 96%   Weight: 85 8 kg (189 lb 3 2 oz)   Height: 5' 2 7" (1 593 m)     Wt Readings from Last 3 Encounters:   05/17/18 85 8 kg (189 lb 3 2 oz)   11/10/17 83 2 kg (183 lb 8 oz)   06/12/17 82 6 kg (182 lb)     Body mass index is 33 84 kg/m²  No exam data present       Physical Exam   Constitutional: She is oriented to person, place, and time  She appears well-developed and well-nourished  No distress  Alert pleasant cooperative seated in room in no acute distress   HENT:   Head: Normocephalic and atraumatic  Right Ear: External ear normal    Left Ear: External ear normal    Mouth/Throat: Oropharynx is clear and moist  No oropharyngeal exudate  Eyes: Pupils are equal, round, and reactive to light  Right eye exhibits no discharge  Left eye exhibits no discharge  No scleral icterus  Reactive  Normal conjunctiva  Glasses present  Neck: Neck supple  Cardiovascular: Normal rate, regular rhythm and normal heart sounds  No murmur heard  Pulmonary/Chest: Effort normal and breath sounds normal  No respiratory distress  She has no wheezes  Clear to auscultation  No crackles no rhonchi no wheeze   Abdominal: Soft  Bowel sounds are normal  There is no tenderness  Soft nontender nondistended positive bowel sounds  No suprapubic tenderness to palpation  No CVA tenderness to palpation   Musculoskeletal: She exhibits no edema  Mild paraspinal muscle spasms and cervical spine  Neurological: She is alert and oriented to person, place, and time  No gross focal neurologic deficits   Skin: Skin is warm and dry  No rash noted  She is not diaphoretic  Psychiatric: She has a normal mood and affect  Her behavior is normal  Judgment and thought content normal    Nursing note and vitals reviewed  No future appointments      Roshan Cruz PA-C  [address]    Patient Care Team:  Anahi Chiu DO as PCP - General  Roshan Cruz PA-C

## 2018-06-06 ENCOUNTER — TELEPHONE (OUTPATIENT)
Dept: INTERNAL MEDICINE CLINIC | Facility: CLINIC | Age: 68
End: 2018-06-06

## 2018-06-06 NOTE — TELEPHONE ENCOUNTER
Please contact patient  She is to get funduscopic eye exam as she is diabetic  She also needs glaucoma screening due to her age  Please give her contact information for local eye doctor    Thanks Иван Jones

## 2018-06-06 NOTE — TELEPHONE ENCOUNTER
Pt called asking what eye dr she should go to for her glaucoma she states that nessa recommended she go to one   She was going  To [facility] just for eye glasses   TY#[phone]

## 2018-06-08 NOTE — TELEPHONE ENCOUNTER
I called the patient with phone numbers for:  1  [address]  2  [address]  3  [facility] - [phone]  She will contact one of these office to schedule an appt

## 2018-06-13 DIAGNOSIS — R92.8 OTHER ABNORMAL AND INCONCLUSIVE FINDINGS ON DIAGNOSTIC IMAGING OF BREAST: ICD-10-CM

## 2018-07-05 ENCOUNTER — APPOINTMENT (OUTPATIENT)
Dept: LAB | Facility: CLINIC | Age: 68
End: 2018-07-05
Payer: COMMERCIAL

## 2018-07-05 ENCOUNTER — HOSPITAL ENCOUNTER (OUTPATIENT)
Dept: MAMMOGRAPHY | Facility: CLINIC | Age: 68
Discharge: HOME | End: 2018-07-05
Payer: COMMERCIAL

## 2018-07-05 DIAGNOSIS — Z11.59 NEED FOR HEPATITIS C SCREENING TEST: ICD-10-CM

## 2018-07-05 DIAGNOSIS — R92.8 FOLLOW-UP EXAMINATION OF ABNORMAL MAMMOGRAM: Primary | ICD-10-CM

## 2018-07-05 DIAGNOSIS — R92.8 ABNORMAL MAMMOGRAM: ICD-10-CM

## 2018-07-05 DIAGNOSIS — E11.9 TYPE 2 DIABETES MELLITUS WITHOUT COMPLICATION, WITHOUT LONG-TERM CURRENT USE OF INSULIN (HCC): ICD-10-CM

## 2018-07-05 DIAGNOSIS — E83.42 HYPOMAGNESEMIA: ICD-10-CM

## 2018-07-05 DIAGNOSIS — I10 BENIGN ESSENTIAL HTN: ICD-10-CM

## 2018-07-05 DIAGNOSIS — Z12.11 SCREENING FOR COLON CANCER: ICD-10-CM

## 2018-07-05 DIAGNOSIS — E78.5 DYSLIPIDEMIA: ICD-10-CM

## 2018-07-05 DIAGNOSIS — I10 ESSENTIAL HYPERTENSION, BENIGN: ICD-10-CM

## 2018-07-05 LAB
ALBUMIN SERPL BCP-MCNC: 3.8 G/DL (ref 3.5–5)
ALP SERPL-CCNC: 96 U/L (ref 46–116)
ALT SERPL W P-5'-P-CCNC: 24 U/L (ref 12–78)
ANION GAP SERPL CALCULATED.3IONS-SCNC: 8 MMOL/L (ref 4–13)
AST SERPL W P-5'-P-CCNC: 21 U/L (ref 5–45)
BASOPHILS # BLD AUTO: 0.07 THOUSANDS/ΜL (ref 0–0.1)
BASOPHILS NFR BLD AUTO: 2 % (ref 0–1)
BILIRUB SERPL-MCNC: 0.82 MG/DL (ref 0.2–1)
BUN SERPL-MCNC: 25 MG/DL (ref 5–25)
CALCIUM SERPL-MCNC: 9.4 MG/DL (ref 8.3–10.1)
CHLORIDE SERPL-SCNC: 99 MMOL/L (ref 100–108)
CHOLEST SERPL-MCNC: 183 MG/DL (ref 50–200)
CO2 SERPL-SCNC: 28 MMOL/L (ref 21–32)
CREAT SERPL-MCNC: 1.15 MG/DL (ref 0.6–1.3)
EOSINOPHIL # BLD AUTO: 0.19 THOUSAND/ΜL (ref 0–0.61)
EOSINOPHIL NFR BLD AUTO: 5 % (ref 0–6)
ERYTHROCYTE [DISTWIDTH] IN BLOOD BY AUTOMATED COUNT: 13.2 % (ref 11.6–15.1)
EST. AVERAGE GLUCOSE BLD GHB EST-MCNC: 134 MG/DL
GFR SERPL CREATININE-BSD FRML MDRD: 56 ML/MIN/1.73SQ M
GLUCOSE P FAST SERPL-MCNC: 104 MG/DL (ref 65–99)
HBA1C MFR BLD: 6.3 % (ref 4.2–6.3)
HCT VFR BLD AUTO: 39.5 % (ref 34.8–46.1)
HDLC SERPL-MCNC: 50 MG/DL (ref 40–60)
HEMOCCULT STL QL IA: NEGATIVE
HGB BLD-MCNC: 12.8 G/DL (ref 11.5–15.4)
IMM GRANULOCYTES # BLD AUTO: 0.01 THOUSAND/UL (ref 0–0.2)
IMM GRANULOCYTES NFR BLD AUTO: 0 % (ref 0–2)
LDLC SERPL CALC-MCNC: 116 MG/DL (ref 0–100)
LYMPHOCYTES # BLD AUTO: 1.5 THOUSANDS/ΜL (ref 0.6–4.47)
LYMPHOCYTES NFR BLD AUTO: 37 % (ref 14–44)
MAGNESIUM SERPL-MCNC: 2.1 MG/DL (ref 1.6–2.6)
MCH RBC QN AUTO: 28.8 PG (ref 26.8–34.3)
MCHC RBC AUTO-ENTMCNC: 32.4 G/DL (ref 31.4–37.4)
MCV RBC AUTO: 89 FL (ref 82–98)
MONOCYTES # BLD AUTO: 0.48 THOUSAND/ΜL (ref 0.17–1.22)
MONOCYTES NFR BLD AUTO: 12 % (ref 4–12)
NEUTROPHILS # BLD AUTO: 1.86 THOUSANDS/ΜL (ref 1.85–7.62)
NEUTS SEG NFR BLD AUTO: 44 % (ref 43–75)
NRBC BLD AUTO-RTO: 0 /100 WBCS
PLATELET # BLD AUTO: 359 THOUSANDS/UL (ref 149–390)
PMV BLD AUTO: 9.2 FL (ref 8.9–12.7)
POTASSIUM SERPL-SCNC: 3.4 MMOL/L (ref 3.5–5.3)
PROT SERPL-MCNC: 9 G/DL (ref 6.4–8.2)
RBC # BLD AUTO: 4.45 MILLION/UL (ref 3.81–5.12)
SODIUM SERPL-SCNC: 135 MMOL/L (ref 136–145)
TRIGL SERPL-MCNC: 86 MG/DL
WBC # BLD AUTO: 4.11 THOUSAND/UL (ref 4.31–10.16)

## 2018-07-05 PROCEDURE — 80053 COMPREHEN METABOLIC PANEL: CPT

## 2018-07-05 PROCEDURE — 83735 ASSAY OF MAGNESIUM: CPT

## 2018-07-05 PROCEDURE — 83036 HEMOGLOBIN GLYCOSYLATED A1C: CPT

## 2018-07-05 PROCEDURE — 85025 COMPLETE CBC W/AUTO DIFF WBC: CPT

## 2018-07-05 PROCEDURE — 80061 LIPID PANEL: CPT

## 2018-07-05 PROCEDURE — G0328 FECAL BLOOD SCRN IMMUNOASSAY: HCPCS

## 2018-07-05 PROCEDURE — 86803 HEPATITIS C AB TEST: CPT

## 2018-07-05 PROCEDURE — 77066 DX MAMMO INCL CAD BI: CPT

## 2018-07-05 PROCEDURE — 36415 COLL VENOUS BLD VENIPUNCTURE: CPT

## 2018-07-06 LAB — HCV AB SER QL: NORMAL

## 2018-07-13 ENCOUNTER — OFFICE VISIT (OUTPATIENT)
Dept: INTERNAL MEDICINE CLINIC | Facility: CLINIC | Age: 68
End: 2018-07-13
Payer: COMMERCIAL

## 2018-07-13 VITALS
RESPIRATION RATE: 20 BRPM | OXYGEN SATURATION: 96 % | BODY MASS INDEX: 33.34 KG/M2 | HEART RATE: 85 BPM | DIASTOLIC BLOOD PRESSURE: 70 MMHG | SYSTOLIC BLOOD PRESSURE: 124 MMHG | WEIGHT: 181.2 LBS | TEMPERATURE: 98.4 F | HEIGHT: 62 IN

## 2018-07-13 DIAGNOSIS — E87.6 HYPOKALEMIA: ICD-10-CM

## 2018-07-13 DIAGNOSIS — E78.5 DYSLIPIDEMIA: ICD-10-CM

## 2018-07-13 DIAGNOSIS — R77.9 ELEVATED BLOOD PROTEIN: Primary | ICD-10-CM

## 2018-07-13 DIAGNOSIS — E83.42 HYPOMAGNESEMIA: ICD-10-CM

## 2018-07-13 DIAGNOSIS — D72.819 LEUKOPENIA, UNSPECIFIED TYPE: ICD-10-CM

## 2018-07-13 DIAGNOSIS — R35.0 URINARY FREQUENCY: ICD-10-CM

## 2018-07-13 DIAGNOSIS — E11.9 TYPE 2 DIABETES MELLITUS WITHOUT COMPLICATION, WITHOUT LONG-TERM CURRENT USE OF INSULIN (HCC): ICD-10-CM

## 2018-07-13 DIAGNOSIS — I10 BENIGN ESSENTIAL HTN: ICD-10-CM

## 2018-07-13 LAB
SL AMB  POCT GLUCOSE, UA: NEGATIVE
SL AMB LEUKOCYTE ESTERASE,UA: NORMAL
SL AMB POCT BILIRUBIN,UA: NEGATIVE
SL AMB POCT BLOOD,UA: NEGATIVE
SL AMB POCT CLARITY,UA: CLEAR
SL AMB POCT COLOR,UA: YELLOW
SL AMB POCT KETONES,UA: NEGATIVE
SL AMB POCT NITRITE,UA: NEGATIVE
SL AMB POCT PH,UA: 7
SL AMB POCT SPECIFIC GRAVITY,UA: 1.01
SL AMB POCT URINE PROTEIN: NEGATIVE
SL AMB POCT UROBILINOGEN: 1

## 2018-07-13 PROCEDURE — 99214 OFFICE O/P EST MOD 30 MIN: CPT | Performed by: PHYSICIAN ASSISTANT

## 2018-07-13 PROCEDURE — 3074F SYST BP LT 130 MM HG: CPT | Performed by: PHYSICIAN ASSISTANT

## 2018-07-13 PROCEDURE — 87086 URINE CULTURE/COLONY COUNT: CPT | Performed by: PHYSICIAN ASSISTANT

## 2018-07-13 PROCEDURE — 81003 URINALYSIS AUTO W/O SCOPE: CPT | Performed by: PHYSICIAN ASSISTANT

## 2018-07-13 PROCEDURE — 3078F DIAST BP <80 MM HG: CPT | Performed by: PHYSICIAN ASSISTANT

## 2018-07-13 RX ORDER — CEPHALEXIN 500 MG/1
500 CAPSULE ORAL EVERY 12 HOURS SCHEDULED
Qty: 14 CAPSULE | Refills: 0 | Status: SHIPPED | OUTPATIENT
Start: 2018-07-13 | End: 2018-07-20

## 2018-07-13 RX ORDER — POTASSIUM CHLORIDE 1500 MG/1
1 TABLET, FILM COATED, EXTENDED RELEASE ORAL 2 TIMES DAILY
Qty: 60 TABLET | Refills: 0 | Status: SHIPPED | OUTPATIENT
Start: 2018-07-13 | End: 2018-08-14 | Stop reason: SDUPTHER

## 2018-07-13 NOTE — ASSESSMENT & PLAN NOTE
Lab Results   Component Value Date    HGBA1C 6 3 07/05/2018       No results for input(s): POCGLU in the last 72 hours  Blood Sugar Average: Last 72 hrs:    A1c noted  Currently on aspirin and statin  Not on any oral medications for blood sugar control as she is controlling well with diet and lifestyle modifications alone    Recently patient had eye exam, will await this report

## 2018-07-13 NOTE — ASSESSMENT & PLAN NOTE
Will obtain SPEP/UPEP repeat CBC  Will follow these results  Discussed pending results may need Hematology follow up  Patient and granddaughter verbalized understanding and are willing    They plan to go for laboratory testing tomorrow

## 2018-07-13 NOTE — ASSESSMENT & PLAN NOTE
Discussed causes and symptoms of UTI with patient  Increased fluid intake (primarily water) to help flush out bacteria  Do not hold your urine  Urinate if you feel you need to every hour or so to empty bladder  Wipe front to back to prevent contamination from GI tract  Urinate after intercourse helps to flush away bacteria  May use OTC sugar free cranberry juice  Complete antibiotics as directed

## 2018-07-13 NOTE — PATIENT INSTRUCTIONS
Leukopenia  NO recent illnesses, no fever  Serum protein elevated  Will check SPEP/UPEP/CBC to further evalute  Elevated blood protein  Will obtain SPEP/UPEP repeat CBC  Will follow these results  Discussed pending results may need Hematology follow up  Dyslipidemia  Stable on Lipitor  Will follow     Hypokalemia  Taking Kcl 20 mEq, increased to twice daily  Urinary frequency  Discussed causes and symptoms of UTI with patient  Increased fluid intake (primarily water) to help flush out bacteria  Do not hold your urine  Urinate if you feel you need to every hour or so to empty bladder  Wipe front to back to prevent contamination from GI tract  Urinate after intercourse helps to flush away bacteria  May use OTC sugar free cranberry juice  Complete antibiotics as directed

## 2018-07-13 NOTE — PROGRESS NOTES
[facility]  Standard Office Visit  Patient ID: Chemo Huerta    : 1950  Age/Gender: 76 y o  female     DATE: 2018      Assessment/Plan:    Leukopenia  NO recent illnesses, no fever  Serum protein elevated  Will check SPEP/UPEP/CBC to further evalute  Elevated blood protein  Will obtain SPEP/UPEP repeat CBC  Will follow these results  Discussed pending results may need Hematology follow up  Patient and granddaughter verbalized understanding and are willing  They plan to go for laboratory testing tomorrow    Dyslipidemia  Stable on Lipitor  Will follow     Hypokalemia  Taking Kcl 20 mEq, increased to twice daily  Urinary frequency  Discussed causes and symptoms of UTI with patient  Increased fluid intake (primarily water) to help flush out bacteria  Do not hold your urine  Urinate if you feel you need to every hour or so to empty bladder  Wipe front to back to prevent contamination from GI tract  Urinate after intercourse helps to flush away bacteria  May use OTC sugar free cranberry juice  Complete antibiotics as directed  Type 2 diabetes mellitus without complication, without long-term current use of insulin ([facility])  Lab Results   Component Value Date    HGBA1C 6 3 2018       No results for input(s): POCGLU in the last 72 hours  Blood Sugar Average: Last 72 hrs:    A1c noted  Currently on aspirin and statin  Not on any oral medications for blood sugar control as she is controlling well with diet and lifestyle modifications alone  Recently patient had eye exam, will await this report    Benign essential HTN   Stable at this time on blood pressure medications  No change  Will continue to follow       Diagnoses and all orders for this visit:    Elevated blood protein  -     Protein electrophoresis, serum; Future  -     TSH, 3rd generation;  Future  -     Protein electrophoresis, urine    Type 2 diabetes mellitus without complication, without long-term current use of insulin (HCC)    Benign essential HTN    Dyslipidemia    Hypomagnesemia    Leukopenia, unspecified type  -     CBC and differential; Future    Hypokalemia  -     Potassium Chloride ER 20 MEQ TBCR; Take 1 tablet (20 mEq total) by mouth 2 (two) times a day  -     Basic metabolic panel; Future    Urinary frequency  -     POCT urine dip  -     Cancel: Urine culture; Future  -     cephalexin (KEFLEX) 500 mg capsule; Take 1 capsule (500 mg total) by mouth every 12 (twelve) hours for 7 days  -     Urine culture; Future  -     Urine culture          Subjective:   Chief Complaint   Patient presents with    Follow-up     Pt is here for a 2 month follow up for her DM  Review labs from 7/15/18  Susie Jacob is a 76 y o  female who presents to the office on 7/13/2018 for      Patient for follow-up here today with her grand daughter  Taking medications as directed  Notes she does have urinary frequency  No pain with urination  No blood in the urine  recently had her mammogram done  Here today to discuss her lab results  Plans to be going back to [place] where she lives with her   Had been spending the early part of this summer with her granddaughter  Diabetes   She presents for her follow-up diabetic visit  She has type 2 diabetes mellitus  Her disease course has been stable  Pertinent negatives for hypoglycemia include no dizziness  Pertinent negatives for diabetes include no blurred vision ( recently saw her eye doctor), no chest pain, no foot ulcerations and no visual change  Her weight is stable  She is following a generally healthy diet  She never participates in exercise  An ACE inhibitor/angiotensin II receptor blocker is being taken  She does not see a podiatrist Eye exam is current  Urinary Frequency    This is a new problem  The problem has been waxing and waning  The pain is at a severity of 0/10   The patient is experiencing no pain  There has been no fever  Associated symptoms include frequency  Pertinent negatives include no hematuria, nausea or vomiting  The following portions of the patient's history were reviewed and updated as appropriate: allergies, current medications, past family history, past medical history, past social history, past surgical history and problem list     Review of Systems   Constitutional: Negative for fever and unexpected weight change  No fever no chills no night sweats   Eyes: Negative for blurred vision ( recently saw her eye doctor)  Respiratory: Negative for cough, shortness of breath and wheezing  Cardiovascular: Negative for chest pain and palpitations  Gastrointestinal: Negative for abdominal distention, abdominal pain, constipation, diarrhea, nausea and vomiting  Genitourinary: Positive for frequency  Negative for dysuria and hematuria  Neurological: Negative for dizziness and light-headedness           Patient Active Problem List   Diagnosis    Abnormal mammogram of right breast    Allergic rhinitis    Benign essential HTN    Depression    DJD (degenerative joint disease) of knee    Dyslipidemia    Hypokalemia    Osteoarthritis of right knee    Skin lesion of cheek    Tremor    Type 2 diabetes mellitus without complication, without long-term current use of insulin (HCC)    Abnormal mammogram    Screening for colon cancer    Poor dentition    Body mass index (BMI) of 33 0-33 9 in adult    Need for shingles vaccine    Elevated blood protein    Leukopenia    Urinary frequency       Past Medical History:   Diagnosis Date    Anxiety     Arthritis     B-complex deficiency     Glaucoma     Gout     Hypercalcemia     Hyperlipidemia     Last assessed: 8/5/15    Irregular heart beat     Memory loss     Palpitations     Suicidal ideation        Past Surgical History:   Procedure Laterality Date    TUBAL LIGATION      VAGINAL DELIVERY      x2         Current Outpatient Prescriptions:     albuterol (PROAIR HFA) 90 mcg/act inhaler, Inhale 2 puffs as needed  , Disp: , Rfl:     amLODIPine (NORVASC) 10 mg tablet, Take 1 tablet (10 mg total) by mouth daily, Disp: 30 tablet, Rfl: 5    aspirin 81 MG tablet, Take 1 tablet by mouth daily, Disp: , Rfl:     atorvastatin (LIPITOR) 40 mg tablet, Take 1 tablet (40 mg total) by mouth daily, Disp: 30 tablet, Rfl: 5    cetirizine (ZyrTEC) 10 mg tablet, Take 1 tablet by mouth daily, Disp: , Rfl:     Cholecalciferol (VITAMIN D3) 2000 units capsule, Take 1 capsule by mouth daily, Disp: , Rfl:     FLUoxetine (PROzac) 20 MG tablet, Take 1 tablet (20 mg total) by mouth daily, Disp: 30 tablet, Rfl: 5    fluticasone (FLONASE) 50 mcg/act nasal spray, 1 spray into each nostril as needed  , Disp: , Rfl:     hydrochlorothiazide (HYDRODIURIL) 25 mg tablet, Take 1 tablet (25 mg total) by mouth daily, Disp: 30 tablet, Rfl: 5    losartan (COZAAR) 100 MG tablet, Take 1 tablet (100 mg total) by mouth daily, Disp: 30 tablet, Rfl: 5    magnesium oxide (MAG-OX) 400 mg tablet, Take 1 tablet (400 mg total) by mouth daily, Disp: 30 tablet, Rfl: 5    Potassium Chloride ER 20 MEQ TBCR, Take 1 tablet (20 mEq total) by mouth 2 (two) times a day, Disp: 60 tablet, Rfl: 0    cephalexin (KEFLEX) 500 mg capsule, Take 1 capsule (500 mg total) by mouth every 12 (twelve) hours for 7 days, Disp: 14 capsule, Rfl: 0    Allergies   Allergen Reactions    Lisinopril Cough    Other      Seasonal allergies       Social History     Social History    Marital status: /Civil Union     Spouse name: N/A    Number of children: N/A    Years of education: N/A     Social History Main Topics    Smoking status: Never Smoker    Smokeless tobacco: Never Used    Alcohol use No    Drug use: No    Sexual activity: Not Asked     Other Topics Concern    None     Social History Narrative    Advance directive in chart           Family History   Problem Relation Age of Onset    Hypertension Mother         Benign Essential    Cancer Mother     Hypertension Father         Benign Essential    Cancer Father     Diabetes Sister     Cirrhosis Daughter         Hepatic    Hepatitis Daughter         Type C Viral       PHQ-9 Depression Screening    PHQ-9:    Frequency of the following problems over the past two weeks:              Health Maintenance   Topic Date Due    DM Eye Exam  05/02/1960    DTaP,Tdap,and Td Vaccines (1 - Tdap) 05/02/1971    GLAUCOMA SCREENING 65 + YR  05/02/2017    INFLUENZA VACCINE  09/01/2018    URINE MICROALBUMIN  11/07/2018    HEMOGLOBIN A1C  01/05/2019    Fall Risk  05/17/2019    Urinary Incontinence Screening  05/17/2019    Diabetic Foot Exam  05/17/2019    CRC Screening: Colonoscopy  03/30/2027    Hepatitis C Screening  Completed    PNEUMOCOCCAL POLYSACCHARIDE VACCINE AGE 65 AND OVER  Completed       Immunization History   Administered Date(s) Administered    Influenza 11/19/2012, 10/31/2013, 01/14/2015, 03/09/2016, 03/15/2017, 11/10/2017    Influenza Quadrivalent Preservative Free 3 years and older IM 01/14/2015    Influenza Split High Dose Preservative Free IM 03/09/2016, 03/15/2017, 11/10/2017    Pneumococcal Conjugate 13-Valent 07/29/2016    Pneumococcal Polysaccharide PPV23 11/19/2012        Objective:  Vitals:    07/13/18 1340   BP: 124/70   BP Location: Left arm   Patient Position: Sitting   Cuff Size: Adult   Pulse: 85   Resp: 20   Temp: 98 4 °F (36 9 °C)   TempSrc: Oral   SpO2: 96%   Weight: 82 2 kg (181 lb 3 2 oz)   Height: 5' 2" (1 575 m)     Wt Readings from Last 3 Encounters:   07/13/18 82 2 kg (181 lb 3 2 oz)   05/17/18 85 8 kg (189 lb 3 2 oz)   11/10/17 83 2 kg (183 lb 8 oz)     Body mass index is 33 14 kg/m²  No exam data present       Physical Exam   Constitutional: She is oriented to person, place, and time  She appears well-developed and well-nourished  No distress     Alert pleasant cooperative  Seated in room in no acute distress   HENT:   Head: Normocephalic and atraumatic  Mouth/Throat: Oropharynx is clear and moist  No oropharyngeal exudate  Eyes: Conjunctivae are normal  Pupils are equal, round, and reactive to light  Right eye exhibits no discharge  Left eye exhibits no discharge  No scleral icterus  Neck: Neck supple  Cardiovascular: Normal rate, regular rhythm and normal heart sounds  Pulses are no weak pulses  No murmur heard  Pulses:       Dorsalis pedis pulses are 2+ on the right side, and 2+ on the left side  Pulmonary/Chest: Effort normal and breath sounds normal  No respiratory distress  She has no wheezes  Clear to auscultation throughout   Abdominal: Soft  Bowel sounds are normal  There is no tenderness  Positive bowel sounds  Soft nontender   Musculoskeletal: She exhibits no edema  Feet:   Right Foot:   Skin Integrity: Negative for ulcer, skin breakdown, erythema, warmth, callus or dry skin  Left Foot:   Skin Integrity: Negative for ulcer, skin breakdown, erythema, warmth, callus or dry skin  Neurological: She is alert and oriented to person, place, and time  No gross focal neurologic deficits on exam   Skin: Skin is warm and dry  She is not diaphoretic  Psychiatric: She has a normal mood and affect  Her behavior is normal  Thought content normal    Nursing note and vitals reviewed  Patient's shoes and socks removed  Right Foot/Ankle   Right Foot Inspection  Skin Exam: skin normal and skin intact no dry skin, no warmth, no callus, no erythema, no maceration, no abnormal color, no pre-ulcer, no ulcer and no callus                          Toe Exam: no swelling, no tenderness and erythema  Sensory       Monofilament testing: intact  Vascular    The right DP pulse is 2+       Left Foot/Ankle  Left Foot Inspection  Skin Exam: skin normal and skin intactno dry skin, no warmth, no erythema, no maceration, normal color, no pre-ulcer, no ulcer and no callus                         Toe Exam: no swelling, no tenderness and no erythema                   Sensory       Monofilament: intact  Vascular    The left DP pulse is 2+  Assign Risk Category:  No deformity present; No loss of protective sensation; No weak pulses       Risk: 0          No future appointments      Brain mars PA-C  [address]    Patient Care Team:  Lorraine Villaseñor,  as PCP - General  Brain mars PA-C

## 2018-07-14 LAB — BACTERIA UR CULT: NORMAL

## 2018-07-24 LAB
ALBUMIN MFR UR ELPH: 50.6 %
ALBUMIN SERPL ELPH-MCNC: 3.6 G/DL (ref 2.9–4.4)
ALBUMIN/GLOB SERPL: 0.8 {RATIO} (ref 0.7–1.7)
ALPHA1 GLOB MFR UR ELPH: 6.5 %
ALPHA1 GLOB SERPL ELPH-MCNC: 0.2 G/DL (ref 0–0.4)
ALPHA2 GLOB MFR UR ELPH: 13.3 %
ALPHA2 GLOB SERPL ELPH-MCNC: 0.9 G/DL (ref 0.4–1)
AMBIG ABBREV DEFAULT: NORMAL
B-GLOBULIN MFR UR ELPH: 17.5 %
B-GLOBULIN SERPL ELPH-MCNC: 1.4 G/DL (ref 0.7–1.3)
BASOPHILS # BLD AUTO: 0.1 X10E3/UL (ref 0–0.2)
BASOPHILS NFR BLD AUTO: 2 %
BUN SERPL-MCNC: 18 MG/DL (ref 8–27)
BUN/CREAT SERPL: 16 (ref 12–28)
CALCIUM SERPL-MCNC: 9.5 MG/DL (ref 8.7–10.3)
CHLORIDE SERPL-SCNC: 96 MMOL/L (ref 96–106)
CO2 SERPL-SCNC: 19 MMOL/L (ref 20–29)
CREAT SERPL-MCNC: 1.12 MG/DL (ref 0.57–1)
EOSINOPHIL # BLD AUTO: 0.2 X10E3/UL (ref 0–0.4)
EOSINOPHIL NFR BLD AUTO: 4 %
ERYTHROCYTE [DISTWIDTH] IN BLOOD BY AUTOMATED COUNT: 14.4 % (ref 12.3–15.4)
GAMMA GLOB MFR UR ELPH: 12.1 %
GAMMA GLOB SERPL ELPH-MCNC: 1.9 G/DL (ref 0.4–1.8)
GLOBULIN SER CALC-MCNC: 4.3 G/DL (ref 2.2–3.9)
GLUCOSE SERPL-MCNC: 114 MG/DL (ref 65–99)
HCT VFR BLD AUTO: 35.8 % (ref 34–46.6)
HGB BLD-MCNC: 12.1 G/DL (ref 11.1–15.9)
IMM GRANULOCYTES # BLD: 0 X10E3/UL (ref 0–0.1)
IMM GRANULOCYTES NFR BLD: 0 %
LABORATORY COMMENT REPORT: ABNORMAL
LABORATORY COMMENT REPORT: NORMAL
LYMPHOCYTES # BLD AUTO: 2.3 X10E3/UL (ref 0.7–3.1)
LYMPHOCYTES NFR BLD AUTO: 44 %
M PROTEIN MFR UR ELPH: NORMAL %
M PROTEIN SERPL ELPH-MCNC: ABNORMAL G/DL
MCH RBC QN AUTO: 28.9 PG (ref 26.6–33)
MCHC RBC AUTO-ENTMCNC: 33.8 G/DL (ref 31.5–35.7)
MCV RBC AUTO: 85 FL (ref 79–97)
MONOCYTES # BLD AUTO: 0.6 X10E3/UL (ref 0.1–0.9)
MONOCYTES NFR BLD AUTO: 11 %
NEUTROPHILS # BLD AUTO: 2 X10E3/UL (ref 1.4–7)
NEUTROPHILS NFR BLD AUTO: 39 %
PLATELET # BLD AUTO: 340 X10E3/UL (ref 150–379)
POTASSIUM SERPL-SCNC: 4.5 MMOL/L (ref 3.5–5.2)
PROT SERPL-MCNC: 7.9 G/DL (ref 6–8.5)
PROT UR-MCNC: 21 MG/DL
RBC # BLD AUTO: 4.19 X10E6/UL (ref 3.77–5.28)
SL AMB EGFR AFRICAN AMERICAN: 58 ML/MIN/1.73
SL AMB EGFR NON AFRICAN AMERICAN: 51 ML/MIN/1.73
SODIUM SERPL-SCNC: 138 MMOL/L (ref 134–144)
TSH SERPL DL<=0.005 MIU/L-ACNC: 0.63 UIU/ML (ref 0.45–4.5)
WBC # BLD AUTO: 5.2 X10E3/UL (ref 3.4–10.8)

## 2018-07-27 ENCOUNTER — TELEPHONE (OUTPATIENT)
Dept: INTERNAL MEDICINE CLINIC | Facility: CLINIC | Age: 68
End: 2018-07-27

## 2018-07-27 DIAGNOSIS — R77.9 ELEVATED BLOOD PROTEIN: Primary | ICD-10-CM

## 2018-07-27 DIAGNOSIS — R77.8 ABNORMAL SPEP: ICD-10-CM

## 2018-07-27 PROBLEM — D72.819 LEUKOPENIA: Status: RESOLVED | Noted: 2018-07-13 | Resolved: 2018-07-27

## 2018-07-27 NOTE — TELEPHONE ENCOUNTER
Called and spoke with patient  Reviewed lab results showing abnormal SPEP with normal WBC count  Patient will need to follow up with hematology oncology, for this reason referral was placed  Patient is currently living in [place] and is trying to decide whether to see a [facility]NA specialist in [place] or if she would like to see one in [place]  She plans to further discuss with her grand-daughter Vanessa Katz her tx plan  She also asked I call and review results and recommendation of hematology evaluation with Vanessa Katz  Called and LMOM with Vanessa Katz [phone]  Please see that she is made aware of need for patient to see hematology oncology for abnormal SPEP and elevated serum protein  Please let me know if any questions    Thanks Jessie Mederos

## 2018-07-27 NOTE — PROGRESS NOTES
Called and spoke with patient  Reviewed lab results showing abnormal SPEP with normal WBC count  Patient will need to follow up with hematology oncology, for this reason referral was placed  Patient is currently living in [address] [place] [address] and is trying to decide whether to see a [facility]NA specialist in [address] [place] [address] or if she would like to see one in [place]  She plans to further discuss with her grand-daughter Pastora Franklin her tx plan  She also asked I call and review results and recommendation of hematology evaluation with Pastora Franklin  Called and LMOM with Pastora Franklin [phone]  Please see that she is made aware of need for patient to see hematology oncology for abnormal SPEP and elevated serum protein  Please let me know if any questions    Thanks Trevon Reyes

## 2018-07-30 NOTE — TELEPHONE ENCOUNTER
Spoke with Arabella Avendaño the grand daughter would like to speak with you more on the matter on getting her in with the hematology oncology specialist in [place]  Please call her back and only speak with her on this matter    The patient was very confused and she wants to get more information from you

## 2018-08-12 DIAGNOSIS — E87.6 HYPOKALEMIA: ICD-10-CM

## 2018-08-12 RX ORDER — POTASSIUM CHLORIDE 20 MEQ/1
TABLET, EXTENDED RELEASE ORAL
Qty: 60 TABLET | Refills: 0 | Status: CANCELLED | OUTPATIENT
Start: 2018-08-12

## 2018-08-13 DIAGNOSIS — E87.6 HYPOKALEMIA: ICD-10-CM

## 2018-08-13 RX ORDER — POTASSIUM CHLORIDE 20 MEQ/1
TABLET, EXTENDED RELEASE ORAL
Qty: 60 TABLET | Refills: 0 | Status: CANCELLED | OUTPATIENT
Start: 2018-08-13

## 2018-08-14 ENCOUNTER — TELEPHONE (OUTPATIENT)
Dept: INTERNAL MEDICINE CLINIC | Facility: CLINIC | Age: 68
End: 2018-08-14

## 2018-08-14 DIAGNOSIS — E87.6 HYPOKALEMIA: ICD-10-CM

## 2018-08-14 RX ORDER — POTASSIUM CHLORIDE 1500 MG/1
1 TABLET, FILM COATED, EXTENDED RELEASE ORAL 2 TIMES DAILY
Qty: 60 TABLET | Refills: 1 | Status: SHIPPED | OUTPATIENT
Start: 2018-08-14 | End: 2018-12-05 | Stop reason: SDUPTHER

## 2018-08-14 NOTE — TELEPHONE ENCOUNTER
Ariadna Ríos called and stated that she needed a refill on her Potassium Chloride 20 meqer tablets, it can be sent to the [facility] [address] and their phone number is [phone]  If there is any issues with refilling it please give Ariadna Ríos back at [phone]  Thank you

## 2018-08-17 ENCOUNTER — TELEPHONE (OUTPATIENT)
Dept: INTERNAL MEDICINE CLINIC | Facility: CLINIC | Age: 68
End: 2018-08-17

## 2018-08-17 NOTE — TELEPHONE ENCOUNTER
Called and spoke with patient  She notes she has hematology follow up scheduled  Her grand-daughter will be taking her to the appointment and she will call if she has any questions

## 2018-08-29 ENCOUNTER — OFFICE VISIT (OUTPATIENT)
Dept: HEMATOLOGY ONCOLOGY | Facility: CLINIC | Age: 68
End: 2018-08-29
Payer: COMMERCIAL

## 2018-08-29 ENCOUNTER — APPOINTMENT (OUTPATIENT)
Dept: LAB | Facility: MEDICAL CENTER | Age: 68
End: 2018-08-29
Payer: COMMERCIAL

## 2018-08-29 VITALS
WEIGHT: 182 LBS | TEMPERATURE: 97.9 F | RESPIRATION RATE: 17 BRPM | DIASTOLIC BLOOD PRESSURE: 80 MMHG | SYSTOLIC BLOOD PRESSURE: 124 MMHG | HEART RATE: 95 BPM | BODY MASS INDEX: 33.49 KG/M2 | OXYGEN SATURATION: 98 % | HEIGHT: 62 IN

## 2018-08-29 DIAGNOSIS — R77.9 ELEVATED BLOOD PROTEIN: ICD-10-CM

## 2018-08-29 DIAGNOSIS — R77.9 ELEVATED BLOOD PROTEIN: Primary | ICD-10-CM

## 2018-08-29 LAB
ALBUMIN SERPL BCP-MCNC: 4 G/DL (ref 3.5–5)
ALP SERPL-CCNC: 93 U/L (ref 46–116)
ALT SERPL W P-5'-P-CCNC: 22 U/L (ref 12–78)
ANION GAP SERPL CALCULATED.3IONS-SCNC: 9 MMOL/L (ref 4–13)
AST SERPL W P-5'-P-CCNC: 22 U/L (ref 5–45)
BILIRUB SERPL-MCNC: 0.66 MG/DL (ref 0.2–1)
BUN SERPL-MCNC: 23 MG/DL (ref 5–25)
CALCIUM SERPL-MCNC: 9.3 MG/DL (ref 8.3–10.1)
CHLORIDE SERPL-SCNC: 101 MMOL/L (ref 100–108)
CO2 SERPL-SCNC: 26 MMOL/L (ref 21–32)
CREAT SERPL-MCNC: 1.37 MG/DL (ref 0.6–1.3)
GFR SERPL CREATININE-BSD FRML MDRD: 46 ML/MIN/1.73SQ M
GLUCOSE SERPL-MCNC: 102 MG/DL (ref 65–140)
IGA SERPL-MCNC: 515 MG/DL (ref 70–400)
IGG SERPL-MCNC: 1840 MG/DL (ref 700–1600)
IGM SERPL-MCNC: 88 MG/DL (ref 40–230)
POTASSIUM SERPL-SCNC: 3.9 MMOL/L (ref 3.5–5.3)
PROT SERPL-MCNC: 8.9 G/DL (ref 6.4–8.2)
SODIUM SERPL-SCNC: 136 MMOL/L (ref 136–145)

## 2018-08-29 PROCEDURE — 84165 PROTEIN E-PHORESIS SERUM: CPT | Performed by: PATHOLOGY

## 2018-08-29 PROCEDURE — 84165 PROTEIN E-PHORESIS SERUM: CPT

## 2018-08-29 PROCEDURE — 99244 OFF/OP CNSLTJ NEW/EST MOD 40: CPT | Performed by: PHYSICIAN ASSISTANT

## 2018-08-29 PROCEDURE — 82784 ASSAY IGA/IGD/IGG/IGM EACH: CPT

## 2018-08-29 PROCEDURE — 36415 COLL VENOUS BLD VENIPUNCTURE: CPT | Performed by: PHYSICIAN ASSISTANT

## 2018-08-29 PROCEDURE — 80053 COMPREHEN METABOLIC PANEL: CPT | Performed by: PHYSICIAN ASSISTANT

## 2018-08-29 NOTE — PROGRESS NOTES
Hematology/Oncology Outpatient Consult  Brenda Helm 76 y o  female 1950 [id number]    Date:  8/29/2018      Assessment and Plan:  1  Elevated blood protein    71-year-old female presents for consultation regarding elevated protein  CMP in July 2018 showed a total protein of 9 0  Reviewing CMP dating back to 2015, patient has had consistent elevated total protein that has been increasing  This is the highest level since 2015  Therefore, an SPEP and UPEP were assessed  However, these did not show an M spike  Therefore, MGUS or multiple myeloma is unlikely  Discussed with patient and granddaughter that [facility] is not as in depth as [facility] testing in regards to SPEP  The testing available shows elevated globulin but does not specify if this is polyclonal hyperglobulinemia  She will have an SPEP performed a [facility] lab today as well as quantitative immunoglobulins  CBC and CMP are unremarkable  Will call granddaughter with resultant determine further follow-up testing and follow-up visit with our office     - IgG, IgA, IgM; Future  - Protein electrophoresis, serum; Future  - Comprehensive metabolic panel      HPI:  76year old female presents for consult for abnormal SPEP  She had CMP on 7/5/18 which showed increase in total protein, 9 0  Therefore, patient had SPEP and UPEP  SPEP  was performed at [facility]   They reported an increase in total globulin  They did not specify if this showed hyperglobulinemia  However, M spike was not present in SPEP or UPEP  Therefore, MGUS or multiple myeloma is not in the differential     ROS: Review of Systems   Constitutional: Negative for appetite change, chills, fatigue, fever and unexpected weight change  Respiratory: Negative for cough and shortness of breath  Cardiovascular: Negative for chest pain, palpitations and leg swelling     Gastrointestinal: Negative for abdominal pain, blood in stool, constipation, diarrhea, nausea and vomiting  Genitourinary: Negative for difficulty urinating, dysuria and hematuria  Musculoskeletal: Negative for arthralgias, joint swelling and myalgias  Skin: Negative  Neurological: Negative for dizziness, weakness, light-headedness, numbness and headaches  Hematological: Negative  Psychiatric/Behavioral: Negative          Past Medical History:   Diagnosis Date    Anxiety     Arthritis     B-complex deficiency     Glaucoma     Gout     Hypercalcemia     Hyperlipidemia     Last assessed: 8/5/15    Irregular heart beat     Memory loss     Palpitations     Suicidal ideation        Past Surgical History:   Procedure Laterality Date    TUBAL LIGATION      VAGINAL DELIVERY      x2       Social History     Social History    Marital status: /Civil Union     Spouse name: N/A    Number of children: N/A    Years of education: N/A     Social History Main Topics    Smoking status: Never Smoker    Smokeless tobacco: Never Used    Alcohol use No    Drug use: No    Sexual activity: Not Asked     Other Topics Concern    None     Social History Narrative    Advance directive in chart           Family History   Problem Relation Age of Onset    Hypertension Mother         Benign Essential    Cancer Mother     Rectal cancer Mother     Hypertension Father         Benign Essential    Cancer Father     Diabetes Sister     Cirrhosis Daughter         Hepatic    Hepatitis Daughter         Type C Viral       Allergies   Allergen Reactions    Lisinopril Cough    Other      Seasonal allergies and animal fur         Current Outpatient Prescriptions:     albuterol (PROAIR HFA) 90 mcg/act inhaler, Inhale 2 puffs as needed  , Disp: , Rfl:     amLODIPine (NORVASC) 10 mg tablet, Take 1 tablet (10 mg total) by mouth daily, Disp: 30 tablet, Rfl: 5    aspirin 81 MG tablet, Take 1 tablet by mouth daily, Disp: , Rfl:     atorvastatin (LIPITOR) 40 mg tablet, Take 1 tablet (40 mg total) by mouth daily, Disp: 30 tablet, Rfl: 5    cetirizine (ZyrTEC) 10 mg tablet, Take 1 tablet by mouth daily, Disp: , Rfl:     Cholecalciferol (VITAMIN D3) 2000 units capsule, Take 1 capsule by mouth daily, Disp: , Rfl:     FLUoxetine (PROzac) 20 MG tablet, Take 1 tablet (20 mg total) by mouth daily, Disp: 30 tablet, Rfl: 5    fluticasone (FLONASE) 50 mcg/act nasal spray, 1 spray into each nostril as needed  , Disp: , Rfl:     hydrochlorothiazide (HYDRODIURIL) 25 mg tablet, Take 1 tablet (25 mg total) by mouth daily, Disp: 30 tablet, Rfl: 5    losartan (COZAAR) 100 MG tablet, Take 1 tablet (100 mg total) by mouth daily, Disp: 30 tablet, Rfl: 5    Potassium Chloride ER 20 MEQ TBCR, Take 1 tablet (20 mEq total) by mouth 2 (two) times a day, Disp: 60 tablet, Rfl: 1    magnesium oxide (MAG-OX) 400 mg tablet, Take 1 tablet (400 mg total) by mouth daily (Patient not taking: Reported on 8/29/2018 ), Disp: 30 tablet, Rfl: 5      Physical Exam:  /80 (BP Location: Left arm, Patient Position: Sitting, Cuff Size: Standard)   Pulse 95   Temp 97 9 °F (36 6 °C) (Tympanic)   Resp 17   Ht 5' 2" (1 575 m)   Wt 82 6 kg (182 lb)   SpO2 98%   BMI 33 29 kg/m²     Physical Exam   Constitutional: She is oriented to person, place, and time  She appears well-developed and well-nourished  No distress  Overweight   HENT:   Head: Normocephalic and atraumatic  Eyes: Conjunctivae are normal  No scleral icterus  Neck: Normal range of motion  Neck supple  Cardiovascular: Normal rate, regular rhythm and normal heart sounds  No murmur heard  Pulmonary/Chest: Effort normal and breath sounds normal  No respiratory distress  Abdominal: Soft  There is no tenderness  Musculoskeletal: Normal range of motion  She exhibits no edema or tenderness  Lymphadenopathy:     She has no cervical adenopathy  Neurological: She is alert and oriented to person, place, and time  No cranial nerve deficit     Skin: Skin is warm and dry  Psychiatric: She has a normal mood and affect  Vitals reviewed  Labs:  Lab Results   Component Value Date    WBC 5 2 07/19/2018    HGB 12 1 07/19/2018    HCT 35 8 07/19/2018    MCV 85 07/19/2018     07/19/2018         Patient voiced understanding and agreement in the above discussion  Aware to contact our office with questions/symptoms in the interim

## 2018-08-29 NOTE — LETTER
August 29, 2018     Marcy Hopkins PA-C  [address] [place] [address]    Patient: Sandra Fernandez   YOB: 1950   Date of Visit: 8/29/2018       Dear Dr Cristela Calles:    Thank you for referring Sandra Fernandez to me for evaluation  Below are my notes for this consultation  If you have questions, please do not hesitate to call me  I look forward to following your patient along with you  Sincerely,        Senait Wong PA-C        CC: No Recipients  Senait Wong PA-C  8/29/2018  4:12 PM  Sign at close encounter  Hematology/Oncology Outpatient Consult  Sandra Fernandez 76 y o  female 1950 [id number]    Date:  8/29/2018      Assessment and Plan:  1  Elevated blood protein    20-year-old female presents for consultation regarding elevated protein  CMP in July 2018 showed a total protein of 9 0  Reviewing CMP dating back to 2015, patient has had consistent elevated total protein that has been increasing  This is the highest level since 2015  Therefore, an SPEP and UPEP were assessed  However, these did not show an M spike  Therefore, MGUS or multiple myeloma is unlikely  Discussed with patient and granddaughter that [facility] is not as in depth as [facility] testing in regards to SPEP  The testing available shows elevated globulin but does not specify if this is polyclonal hyperglobulinemia  She will have an SPEP performed a [facility] lab today as well as quantitative immunoglobulins  CBC and CMP are unremarkable  Will call granddaughter with resultant determine further follow-up testing and follow-up visit with our office     - IgG, IgA, IgM; Future  - Protein electrophoresis, serum; Future  - Comprehensive metabolic panel      HPI:  76year old female presents for consult for abnormal SPEP  She had CMP on 7/5/18 which showed increase in total protein, 9 0  Therefore, patient had SPEP and UPEP       SPEP  was performed at [facility]   They reported an increase in total globulin  They did not specify if this showed hyperglobulinemia  However, M spike was not present in SPEP or UPEP  Therefore, MGUS or multiple myeloma is not in the differential     ROS: Review of Systems   Constitutional: Negative for appetite change, chills, fatigue, fever and unexpected weight change  Respiratory: Negative for cough and shortness of breath  Cardiovascular: Negative for chest pain, palpitations and leg swelling  Gastrointestinal: Negative for abdominal pain, blood in stool, constipation, diarrhea, nausea and vomiting  Genitourinary: Negative for difficulty urinating, dysuria and hematuria  Musculoskeletal: Negative for arthralgias, joint swelling and myalgias  Skin: Negative  Neurological: Negative for dizziness, weakness, light-headedness, numbness and headaches  Hematological: Negative  Psychiatric/Behavioral: Negative          Past Medical History:   Diagnosis Date    Anxiety     Arthritis     B-complex deficiency     Glaucoma     Gout     Hypercalcemia     Hyperlipidemia     Last assessed: 8/5/15    Irregular heart beat     Memory loss     Palpitations     Suicidal ideation        Past Surgical History:   Procedure Laterality Date    TUBAL LIGATION      VAGINAL DELIVERY      x2       Social History     Social History    Marital status: /Civil Union     Spouse name: N/A    Number of children: N/A    Years of education: N/A     Social History Main Topics    Smoking status: Never Smoker    Smokeless tobacco: Never Used    Alcohol use No    Drug use: No    Sexual activity: Not Asked     Other Topics Concern    None     Social History Narrative    Advance directive in chart           Family History   Problem Relation Age of Onset    Hypertension Mother         Benign Essential    Cancer Mother     Rectal cancer Mother     Hypertension Father         Benign Essential    Cancer Father     Diabetes Sister     Cirrhosis Daughter         Hepatic    Hepatitis Daughter         Type C Viral       Allergies   Allergen Reactions    Lisinopril Cough    Other      Seasonal allergies and animal fur         Current Outpatient Prescriptions:     albuterol (PROAIR HFA) 90 mcg/act inhaler, Inhale 2 puffs as needed  , Disp: , Rfl:     amLODIPine (NORVASC) 10 mg tablet, Take 1 tablet (10 mg total) by mouth daily, Disp: 30 tablet, Rfl: 5    aspirin 81 MG tablet, Take 1 tablet by mouth daily, Disp: , Rfl:     atorvastatin (LIPITOR) 40 mg tablet, Take 1 tablet (40 mg total) by mouth daily, Disp: 30 tablet, Rfl: 5    cetirizine (ZyrTEC) 10 mg tablet, Take 1 tablet by mouth daily, Disp: , Rfl:     Cholecalciferol (VITAMIN D3) 2000 units capsule, Take 1 capsule by mouth daily, Disp: , Rfl:     FLUoxetine (PROzac) 20 MG tablet, Take 1 tablet (20 mg total) by mouth daily, Disp: 30 tablet, Rfl: 5    fluticasone (FLONASE) 50 mcg/act nasal spray, 1 spray into each nostril as needed  , Disp: , Rfl:     hydrochlorothiazide (HYDRODIURIL) 25 mg tablet, Take 1 tablet (25 mg total) by mouth daily, Disp: 30 tablet, Rfl: 5    losartan (COZAAR) 100 MG tablet, Take 1 tablet (100 mg total) by mouth daily, Disp: 30 tablet, Rfl: 5    Potassium Chloride ER 20 MEQ TBCR, Take 1 tablet (20 mEq total) by mouth 2 (two) times a day, Disp: 60 tablet, Rfl: 1    magnesium oxide (MAG-OX) 400 mg tablet, Take 1 tablet (400 mg total) by mouth daily (Patient not taking: Reported on 8/29/2018 ), Disp: 30 tablet, Rfl: 5      Physical Exam:  /80 (BP Location: Left arm, Patient Position: Sitting, Cuff Size: Standard)   Pulse 95   Temp 97 9 °F (36 6 °C) (Tympanic)   Resp 17   Ht 5' 2" (1 575 m)   Wt 82 6 kg (182 lb)   SpO2 98%   BMI 33 29 kg/m²      Physical Exam   Constitutional: She is oriented to person, place, and time  She appears well-developed and well-nourished  No distress  Overweight   HENT:   Head: Normocephalic and atraumatic  Eyes: Conjunctivae are normal  No scleral icterus  Neck: Normal range of motion  Neck supple  Cardiovascular: Normal rate, regular rhythm and normal heart sounds  No murmur heard  Pulmonary/Chest: Effort normal and breath sounds normal  No respiratory distress  Abdominal: Soft  There is no tenderness  Musculoskeletal: Normal range of motion  She exhibits no edema or tenderness  Lymphadenopathy:     She has no cervical adenopathy  Neurological: She is alert and oriented to person, place, and time  No cranial nerve deficit  Skin: Skin is warm and dry  Psychiatric: She has a normal mood and affect  Vitals reviewed  Labs:  Lab Results   Component Value Date    WBC 5 2 07/19/2018    HGB 12 1 07/19/2018    HCT 35 8 07/19/2018    MCV 85 07/19/2018     07/19/2018         Patient voiced understanding and agreement in the above discussion  Aware to contact our office with questions/symptoms in the interim

## 2018-08-30 LAB
ALBUMIN SERPL ELPH-MCNC: 4.45 G/DL (ref 3.5–5)
ALBUMIN SERPL ELPH-MCNC: 52.3 % (ref 52–65)
ALPHA1 GLOB SERPL ELPH-MCNC: 0.26 G/DL (ref 0.1–0.4)
ALPHA1 GLOB SERPL ELPH-MCNC: 3.1 % (ref 2.5–5)
ALPHA2 GLOB SERPL ELPH-MCNC: 0.83 G/DL (ref 0.4–1.2)
ALPHA2 GLOB SERPL ELPH-MCNC: 9.8 % (ref 7–13)
BETA GLOB ABNORMAL SERPL ELPH-MCNC: 0.55 G/DL (ref 0.4–0.8)
BETA1 GLOB SERPL ELPH-MCNC: 6.5 % (ref 5–13)
BETA2 GLOB SERPL ELPH-MCNC: 7.1 % (ref 2–8)
BETA2+GAMMA GLOB SERPL ELPH-MCNC: 0.6 G/DL (ref 0.2–0.5)
GAMMA GLOB ABNORMAL SERPL ELPH-MCNC: 1.8 G/DL (ref 0.5–1.6)
GAMMA GLOB SERPL ELPH-MCNC: 21.2 % (ref 12–22)
IGG/ALB SER: 1.1 {RATIO} (ref 1.1–1.8)
PROT SERPL-MCNC: 8.5 G/DL (ref 6.4–8.2)

## 2018-09-05 ENCOUNTER — TELEPHONE (OUTPATIENT)
Dept: HEMATOLOGY ONCOLOGY | Facility: CLINIC | Age: 68
End: 2018-09-05

## 2018-09-05 DIAGNOSIS — D89.2 HYPERGAMMAGLOBULINEMIA: Primary | ICD-10-CM

## 2018-09-05 NOTE — TELEPHONE ENCOUNTER
Please call patient's granddaughter and let her know the blood tests showed what we expected, increased in multiple protein but this is not significant  It can be monitored  Will reassess labs (the ones I put in today) and US before visit in 3 months  Please arrange for visit and ultrasound

## 2018-09-07 ENCOUNTER — TELEPHONE (OUTPATIENT)
Dept: HEMATOLOGY ONCOLOGY | Facility: CLINIC | Age: 68
End: 2018-09-07

## 2018-09-07 NOTE — TELEPHONE ENCOUNTER
Per Mell Norris, spoke with patients granddaughter  I updated her on her grandmothers blood work and set up an appointment with them to return to see Mell Norris in 3 months on 12/5  I also explained to her that the patient needs to have the blood work and ultrasound completed before this appointment  The US was scheduled for Nov 14  Granddaughter confirmed all of this  I also gave the granddaughter instructions for the ultrasound given to me by Amber Tobias at central scheduling  Pt is not to eat or drink anything after midnight the day of the US  Pt is not to drink anything carbonated the day before the US  Meds are okay to take the day of the [address] with a small sip of water if needed

## 2018-11-10 DIAGNOSIS — E78.5 DYSLIPIDEMIA: ICD-10-CM

## 2018-11-10 DIAGNOSIS — F41.8 DEPRESSION WITH ANXIETY: ICD-10-CM

## 2018-11-10 DIAGNOSIS — I10 ESSENTIAL HYPERTENSION, BENIGN: ICD-10-CM

## 2018-11-14 ENCOUNTER — HOSPITAL ENCOUNTER (OUTPATIENT)
Dept: ULTRASOUND IMAGING | Facility: MEDICAL CENTER | Age: 68
Discharge: HOME | End: 2018-11-14
Payer: COMMERCIAL

## 2018-11-14 DIAGNOSIS — D89.2 HYPERGAMMAGLOBULINEMIA: ICD-10-CM

## 2018-11-14 PROCEDURE — 76705 ECHO EXAM OF ABDOMEN: CPT

## 2018-11-15 ENCOUNTER — TELEPHONE (OUTPATIENT)
Dept: INTERNAL MEDICINE CLINIC | Facility: CLINIC | Age: 68
End: 2018-11-15

## 2018-11-15 NOTE — TELEPHONE ENCOUNTER
Patient called and stated that she needs refills on her medications:   -Magnesium oxide 400mg   -Losartan 100mg    -Hydrochlorothiazide 25mg   -Fluoxetine 20mg   -Atorvastatin 40mg   -Amlodlpine 10mg   -Potassium Chloride 20mg    She would like her medications sent to [facility] on [address] in [facility]  Thank you!

## 2018-11-16 PROBLEM — K80.20 CALCULUS OF GALLBLADDER WITHOUT CHOLECYSTITIS WITHOUT OBSTRUCTION: Status: ACTIVE | Noted: 2018-11-16

## 2018-11-16 RX ORDER — FLUOXETINE 20 MG/1
TABLET, FILM COATED ORAL
Qty: 30 TABLET | Refills: 0 | Status: SHIPPED | OUTPATIENT
Start: 2018-11-16 | End: 2018-11-20 | Stop reason: SDUPTHER

## 2018-11-16 RX ORDER — ATORVASTATIN CALCIUM 40 MG/1
TABLET, FILM COATED ORAL
Qty: 30 TABLET | Refills: 0 | Status: SHIPPED | OUTPATIENT
Start: 2018-11-16 | End: 2018-11-20 | Stop reason: SDUPTHER

## 2018-11-16 RX ORDER — LOSARTAN POTASSIUM 100 MG/1
TABLET ORAL
Qty: 30 TABLET | Refills: 0 | Status: SHIPPED | OUTPATIENT
Start: 2018-11-16 | End: 2018-11-20 | Stop reason: SDUPTHER

## 2018-11-16 RX ORDER — HYDROCHLOROTHIAZIDE 25 MG/1
TABLET ORAL
Qty: 30 TABLET | Refills: 0 | Status: SHIPPED | OUTPATIENT
Start: 2018-11-16 | End: 2018-11-20 | Stop reason: SDUPTHER

## 2018-11-16 RX ORDER — AMLODIPINE BESYLATE 10 MG/1
TABLET ORAL
Qty: 30 TABLET | Refills: 0 | Status: SHIPPED | OUTPATIENT
Start: 2018-11-16 | End: 2018-11-20 | Stop reason: SDUPTHER

## 2018-11-20 DIAGNOSIS — I10 ESSENTIAL HYPERTENSION, BENIGN: ICD-10-CM

## 2018-11-20 DIAGNOSIS — E78.5 DYSLIPIDEMIA: ICD-10-CM

## 2018-11-20 DIAGNOSIS — F41.8 DEPRESSION WITH ANXIETY: ICD-10-CM

## 2018-11-20 PROCEDURE — 4010F ACE/ARB THERAPY RXD/TAKEN: CPT | Performed by: PHYSICIAN ASSISTANT

## 2018-11-20 RX ORDER — AMLODIPINE BESYLATE 10 MG/1
10 TABLET ORAL DAILY
Qty: 90 TABLET | Refills: 1 | Status: SHIPPED | OUTPATIENT
Start: 2018-11-20 | End: 2019-05-21 | Stop reason: SDUPTHER

## 2018-11-20 RX ORDER — HYDROCHLOROTHIAZIDE 25 MG/1
25 TABLET ORAL DAILY
Qty: 90 TABLET | Refills: 1 | Status: SHIPPED | OUTPATIENT
Start: 2018-11-20 | End: 2019-01-25 | Stop reason: SDUPTHER

## 2018-11-20 RX ORDER — LOSARTAN POTASSIUM 100 MG/1
100 TABLET ORAL DAILY
Qty: 90 TABLET | Refills: 1 | Status: SHIPPED | OUTPATIENT
Start: 2018-11-20 | End: 2019-05-21 | Stop reason: SDUPTHER

## 2018-11-20 RX ORDER — FLUOXETINE 20 MG/1
20 TABLET, FILM COATED ORAL DAILY
Qty: 90 TABLET | Refills: 1 | Status: SHIPPED | OUTPATIENT
Start: 2018-11-20 | End: 2019-05-21 | Stop reason: SDUPTHER

## 2018-11-20 RX ORDER — ATORVASTATIN CALCIUM 40 MG/1
40 TABLET, FILM COATED ORAL DAILY
Qty: 90 TABLET | Refills: 1 | Status: SHIPPED | OUTPATIENT
Start: 2018-11-20 | End: 2019-01-04 | Stop reason: SDUPTHER

## 2018-11-20 NOTE — TELEPHONE ENCOUNTER
Please resend  Medications never made it electronically to [facility] I called both [facility] locations in her chart and both stated they did not receive anything

## 2018-12-04 ENCOUNTER — APPOINTMENT (OUTPATIENT)
Dept: LAB | Facility: HOSPITAL | Age: 68
End: 2018-12-04
Payer: COMMERCIAL

## 2018-12-04 ENCOUNTER — TELEPHONE (OUTPATIENT)
Dept: HEMATOLOGY ONCOLOGY | Facility: CLINIC | Age: 68
End: 2018-12-04

## 2018-12-04 DIAGNOSIS — D89.2 HYPERGAMMAGLOBULINEMIA: ICD-10-CM

## 2018-12-04 LAB
ALBUMIN SERPL BCP-MCNC: 3.8 G/DL (ref 3.5–5)
ALP SERPL-CCNC: 105 U/L (ref 46–116)
ALT SERPL W P-5'-P-CCNC: 21 U/L (ref 12–78)
ANION GAP SERPL CALCULATED.3IONS-SCNC: 10 MMOL/L (ref 4–13)
AST SERPL W P-5'-P-CCNC: 22 U/L (ref 5–45)
BASOPHILS # BLD AUTO: 0.08 THOUSANDS/ΜL (ref 0–0.1)
BASOPHILS NFR BLD AUTO: 1 % (ref 0–1)
BILIRUB SERPL-MCNC: 0.63 MG/DL (ref 0.2–1)
BUN SERPL-MCNC: 24 MG/DL (ref 5–25)
CALCIUM SERPL-MCNC: 9.7 MG/DL (ref 8.3–10.1)
CHLORIDE SERPL-SCNC: 92 MMOL/L (ref 100–108)
CO2 SERPL-SCNC: 26 MMOL/L (ref 21–32)
CREAT SERPL-MCNC: 1.23 MG/DL (ref 0.6–1.3)
CRP SERPL QL: <3 MG/L
EOSINOPHIL # BLD AUTO: 0.33 THOUSAND/ΜL (ref 0–0.61)
EOSINOPHIL NFR BLD AUTO: 5 % (ref 0–6)
ERYTHROCYTE [DISTWIDTH] IN BLOOD BY AUTOMATED COUNT: 12.8 % (ref 11.6–15.1)
ERYTHROCYTE [SEDIMENTATION RATE] IN BLOOD: 28 MM/HOUR (ref 0–20)
GFR SERPL CREATININE-BSD FRML MDRD: 52 ML/MIN/1.73SQ M
GLUCOSE SERPL-MCNC: 104 MG/DL (ref 65–140)
HCT VFR BLD AUTO: 38 % (ref 34.8–46.1)
HGB BLD-MCNC: 12.5 G/DL (ref 11.5–15.4)
IGA SERPL-MCNC: 518 MG/DL (ref 70–400)
IGG SERPL-MCNC: 1930 MG/DL (ref 700–1600)
IGM SERPL-MCNC: 81 MG/DL (ref 40–230)
IMM GRANULOCYTES # BLD AUTO: 0.01 THOUSAND/UL (ref 0–0.2)
IMM GRANULOCYTES NFR BLD AUTO: 0 % (ref 0–2)
LYMPHOCYTES # BLD AUTO: 2.25 THOUSANDS/ΜL (ref 0.6–4.47)
LYMPHOCYTES NFR BLD AUTO: 37 % (ref 14–44)
MCH RBC QN AUTO: 29.6 PG (ref 26.8–34.3)
MCHC RBC AUTO-ENTMCNC: 32.9 G/DL (ref 31.4–37.4)
MCV RBC AUTO: 90 FL (ref 82–98)
MONOCYTES # BLD AUTO: 0.83 THOUSAND/ΜL (ref 0.17–1.22)
MONOCYTES NFR BLD AUTO: 14 % (ref 4–12)
NEUTROPHILS # BLD AUTO: 2.65 THOUSANDS/ΜL (ref 1.85–7.62)
NEUTS SEG NFR BLD AUTO: 43 % (ref 43–75)
NRBC BLD AUTO-RTO: 0 /100 WBCS
PLATELET # BLD AUTO: 352 THOUSANDS/UL (ref 149–390)
PMV BLD AUTO: 9 FL (ref 8.9–12.7)
POTASSIUM SERPL-SCNC: 2.9 MMOL/L (ref 3.5–5.3)
PROT SERPL-MCNC: 9 G/DL (ref 6.4–8.2)
RBC # BLD AUTO: 4.22 MILLION/UL (ref 3.81–5.12)
SODIUM SERPL-SCNC: 128 MMOL/L (ref 136–145)
WBC # BLD AUTO: 6.15 THOUSAND/UL (ref 4.31–10.16)

## 2018-12-04 PROCEDURE — 86140 C-REACTIVE PROTEIN: CPT

## 2018-12-04 PROCEDURE — 82784 ASSAY IGA/IGD/IGG/IGM EACH: CPT

## 2018-12-04 PROCEDURE — 85025 COMPLETE CBC W/AUTO DIFF WBC: CPT

## 2018-12-04 PROCEDURE — 36415 COLL VENOUS BLD VENIPUNCTURE: CPT

## 2018-12-04 PROCEDURE — 85652 RBC SED RATE AUTOMATED: CPT

## 2018-12-04 PROCEDURE — 80053 COMPREHEN METABOLIC PANEL: CPT

## 2018-12-04 NOTE — TELEPHONE ENCOUNTER
I called the patient to remind her that she should get her blood work done prior her appointment  and I spoke to her granddaughter Peter Gonzalez, she will take the patient to the lab today after work

## 2018-12-05 ENCOUNTER — OFFICE VISIT (OUTPATIENT)
Dept: HEMATOLOGY ONCOLOGY | Facility: CLINIC | Age: 68
End: 2018-12-05
Payer: COMMERCIAL

## 2018-12-05 VITALS
BODY MASS INDEX: 32.79 KG/M2 | OXYGEN SATURATION: 96 % | WEIGHT: 178.2 LBS | HEART RATE: 94 BPM | TEMPERATURE: 97.1 F | RESPIRATION RATE: 18 BRPM | DIASTOLIC BLOOD PRESSURE: 78 MMHG | HEIGHT: 62 IN | SYSTOLIC BLOOD PRESSURE: 118 MMHG

## 2018-12-05 DIAGNOSIS — E87.6 HYPOKALEMIA: ICD-10-CM

## 2018-12-05 DIAGNOSIS — D89.2 HYPERGAMMAGLOBULINEMIA: Primary | ICD-10-CM

## 2018-12-05 PROCEDURE — 99213 OFFICE O/P EST LOW 20 MIN: CPT | Performed by: PHYSICIAN ASSISTANT

## 2018-12-05 RX ORDER — POTASSIUM CHLORIDE 1500 MG/1
1 TABLET, FILM COATED, EXTENDED RELEASE ORAL 2 TIMES DAILY
Qty: 20 TABLET | Refills: 0 | Status: SHIPPED | OUTPATIENT
Start: 2018-12-05 | End: 2019-01-25 | Stop reason: ALTCHOICE

## 2018-12-05 NOTE — LETTER
December 7, 2018     Blackfoot Hatchet, PA-C  [address] [place] [address]    Patient: Janett Manuel   YOB: 1950   Date of Visit: 12/5/2018       Dear Dr Walter Javier:    Thank you for referring Janett Manuel to me for evaluation  Below are my notes for this consultation  If you have questions, please do not hesitate to call me  I look forward to following your patient along with you  Sincerely,        Neeta Bowman PA-C        CC: No Recipients  Neeta Bowman PA-C  12/7/2018  3:41 PM  Sign at close encounter  Hematology/Oncology Outpatient Follow-up  Janett Manuel 76 y o  female 1950 [id number]    Date:  12/5/2018      Assessment and Plan:  1  Hypergammaglobulinemia  14-year-old female with history of hypergammaglobulinemia  This remains stable  Observation is continued  Follow-up in 6 months     - CBC and differential; Future  - Comprehensive metabolic panel; Future  - IgG, IgA, IgM; Future    2  Hypokalemia  The CMP prior to today's visit  This showed a potassium 2 9  She is taking hydrochlorothiazide 25 mg daily  She denies any change in appetite or diarrhea  She has not increased dose of hydrochlorothiazide at any time  She is not taking any potassium  Patient was given a prescription for potassium 40 mEq daily for 10 days  She is to repeat a BMP and follow-up with her PCP regarding continuing management  She would likely benefit from potassium 10 mEq daily along with her hydrochlorothiazide as this has become a chronic problem that patient's potassium decreases  I will send a copy of the note patient's PCP as well  - Potassium Chloride ER 20 MEQ TBCR; Take 1 tablet (20 mEq total) by mouth 2 (two) times a day  Dispense: 20 tablet; Refill: 0  - Basic metabolic panel; Future      HPI:  14-year-old female presents for follow-up regarding hypergammaglobulinemia  Patient was seen in consult the shin in our office in August 2018    This was due to elevated total protein found on CMP  SPEP was assessed in August 2018 which showed hypergammaglobulinemia, no monoclonal band  Serum immunoglobulins were elevated, IgA 515, IgG 1840  Sed rate 28    She presents today for follow-up  Interval history: She is taking HCTZ once daily as prescribed  She took potassium a long time ago  She does not take daily  ROS: Review of Systems   Constitutional: Negative for appetite change and fatigue  Eyes: Negative for visual disturbance  Respiratory: Negative for cough and shortness of breath  Cardiovascular: Negative for chest pain, palpitations and leg swelling  Gastrointestinal: Negative for abdominal pain, constipation, diarrhea, nausea and vomiting  Bowel movements are every other day, her normal     Genitourinary: Negative for difficulty urinating and dysuria  Skin: Negative  Neurological: Negative for dizziness, weakness and headaches  Hematological: Negative for adenopathy  Does not bruise/bleed easily  Psychiatric/Behavioral: Negative          Past Medical History:   Diagnosis Date    Anxiety     Arthritis     B-complex deficiency     Glaucoma     Gout     Hypercalcemia     Hyperlipidemia     Last assessed: 8/5/15    Irregular heart beat     Memory loss     Palpitations     Suicidal ideation        Past Surgical History:   Procedure Laterality Date    TUBAL LIGATION      VAGINAL DELIVERY      x2       Social History     Social History    Marital status: /Civil Union     Spouse name: N/A    Number of children: N/A    Years of education: N/A     Social History Main Topics    Smoking status: Never Smoker    Smokeless tobacco: Never Used    Alcohol use No    Drug use: No    Sexual activity: Not on file     Other Topics Concern    Not on file     Social History Narrative    Advance directive in chart           Family History   Problem Relation Age of Onset    Hypertension Mother         Benign Essential    Cancer Mother     Rectal cancer Mother     Hypertension Father         Benign Essential    Cancer Father     Diabetes Sister     Cirrhosis Daughter         Hepatic    Hepatitis Daughter         Type C Viral       Allergies   Allergen Reactions    Lisinopril Cough    Other      Seasonal allergies and animal fur         Current Outpatient Prescriptions:     albuterol (PROAIR HFA) 90 mcg/act inhaler, Inhale 2 puffs as needed  , Disp: , Rfl:     amLODIPine (NORVASC) 10 mg tablet, Take 1 tablet (10 mg total) by mouth daily, Disp: 90 tablet, Rfl: 1    aspirin 81 MG tablet, Take 1 tablet by mouth daily, Disp: , Rfl:     atorvastatin (LIPITOR) 40 mg tablet, Take 1 tablet (40 mg total) by mouth daily, Disp: 90 tablet, Rfl: 1    cetirizine (ZyrTEC) 10 mg tablet, Take 1 tablet by mouth daily, Disp: , Rfl:     Cholecalciferol (VITAMIN D3) 2000 units capsule, Take 1 capsule by mouth daily, Disp: , Rfl:     FLUoxetine (PROzac) 20 MG tablet, Take 1 tablet (20 mg total) by mouth daily, Disp: 90 tablet, Rfl: 1    fluticasone (FLONASE) 50 mcg/act nasal spray, 1 spray into each nostril as needed  , Disp: , Rfl:     hydrochlorothiazide (HYDRODIURIL) 25 mg tablet, Take 1 tablet (25 mg total) by mouth daily, Disp: 90 tablet, Rfl: 1    losartan (COZAAR) 100 MG tablet, Take 1 tablet (100 mg total) by mouth daily, Disp: 90 tablet, Rfl: 1    magnesium oxide (MAG-OX) 400 mg tablet, Take 1 tablet (400 mg total) by mouth daily (Patient not taking: Reported on 8/29/2018 ), Disp: 30 tablet, Rfl: 5    Potassium Chloride ER 20 MEQ TBCR, Take 1 tablet (20 mEq total) by mouth 2 (two) times a day, Disp: 60 tablet, Rfl: 1      Physical Exam:  /78 (BP Location: Right arm, Cuff Size: Standard)   Pulse 94   Temp (!) 97 1 °F (36 2 °C) (Oral)   Resp 18   Ht 5' 2" (1 575 m)   Wt 80 8 kg (178 lb 3 2 oz)   SpO2 96%   BMI 32 59 kg/m²      Physical Exam   Constitutional: She is oriented to person, place, and time  She appears well-developed and well-nourished  No distress  HENT:   Head: Normocephalic and atraumatic  Eyes: Conjunctivae are normal  No scleral icterus  Neck: Normal range of motion  Neck supple  Cardiovascular: Normal rate, regular rhythm and normal heart sounds  No murmur heard  Pulmonary/Chest: Effort normal and breath sounds normal  No respiratory distress  Abdominal: Soft  There is no tenderness  Musculoskeletal: Normal range of motion  She exhibits no edema or tenderness  Neurological: She is alert and oriented to person, place, and time  No cranial nerve deficit  Skin: Skin is warm and dry  Psychiatric: She has a normal mood and affect  Vitals reviewed  Labs:  Lab Results   Component Value Date    WBC 6 15 12/04/2018    HGB 12 5 12/04/2018    HCT 38 0 12/04/2018    MCV 90 12/04/2018     12/04/2018     Lab Results   Component Value Date     05/30/2015    K 2 9 (L) 12/04/2018    CL 92 (L) 12/04/2018    CO2 26 12/04/2018    ANIONGAP 6 05/30/2015    BUN 24 12/04/2018    CREATININE 1 23 12/04/2018    GLUCOSE 105 05/30/2015    GLUF 104 (H) 07/05/2018    CALCIUM 9 7 12/04/2018    AST 22 12/04/2018    ALT 21 12/04/2018    ALKPHOS 105 12/04/2018    PROT 8 5 (H) 05/30/2015    BILITOT 0 56 05/30/2015    EGFR 52 12/04/2018       Patient voiced understanding and agreement in the above discussion  Aware to contact our office with questions/symptoms in the interim

## 2018-12-05 NOTE — PROGRESS NOTES
Hematology/Oncology Outpatient Follow-up  Mariela Spencer 76 y o  female 1950 [id number]    Date:  12/5/2018      Assessment and Plan:  1  Hypergammaglobulinemia  70-year-old female with history of hypergammaglobulinemia  This remains stable  Observation is continued  Follow-up in 6 months     - CBC and differential; Future  - Comprehensive metabolic panel; Future  - IgG, IgA, IgM; Future    2  Hypokalemia  The CMP prior to today's visit  This showed a potassium 2 9  She is taking hydrochlorothiazide 25 mg daily  She denies any change in appetite or diarrhea  She has not increased dose of hydrochlorothiazide at any time  She is not taking any potassium  Patient was given a prescription for potassium 40 mEq daily for 10 days  She is to repeat a BMP and follow-up with her PCP regarding continuing management  She would likely benefit from potassium 10 mEq daily along with her hydrochlorothiazide as this has become a chronic problem that patient's potassium decreases  I will send a copy of the note patient's PCP as well  - Potassium Chloride ER 20 MEQ TBCR; Take 1 tablet (20 mEq total) by mouth 2 (two) times a day  Dispense: 20 tablet; Refill: 0  - Basic metabolic panel; Future      HPI:  70-year-old female presents for follow-up regarding hypergammaglobulinemia  Patient was seen in consult the shin in our office in August 2018  This was due to elevated total protein found on CMP  SPEP was assessed in August 2018 which showed hypergammaglobulinemia, no monoclonal band  Serum immunoglobulins were elevated, IgA 515, IgG 1840  Sed rate 28    She presents today for follow-up  Interval history: She is taking HCTZ once daily as prescribed  She took potassium a long time ago  She does not take daily  ROS: Review of Systems   Constitutional: Negative for appetite change and fatigue  Eyes: Negative for visual disturbance  Respiratory: Negative for cough and shortness of breath  Cardiovascular: Negative for chest pain, palpitations and leg swelling  Gastrointestinal: Negative for abdominal pain, constipation, diarrhea, nausea and vomiting  Bowel movements are every other day, her normal     Genitourinary: Negative for difficulty urinating and dysuria  Skin: Negative  Neurological: Negative for dizziness, weakness and headaches  Hematological: Negative for adenopathy  Does not bruise/bleed easily  Psychiatric/Behavioral: Negative          Past Medical History:   Diagnosis Date    Anxiety     Arthritis     B-complex deficiency     Glaucoma     Gout     Hypercalcemia     Hyperlipidemia     Last assessed: 8/5/15    Irregular heart beat     Memory loss     Palpitations     Suicidal ideation        Past Surgical History:   Procedure Laterality Date    TUBAL LIGATION      VAGINAL DELIVERY      x2       Social History     Social History    Marital status: /Civil Union     Spouse name: N/A    Number of children: N/A    Years of education: N/A     Social History Main Topics    Smoking status: Never Smoker    Smokeless tobacco: Never Used    Alcohol use No    Drug use: No    Sexual activity: Not on file     Other Topics Concern    Not on file     Social History Narrative    Advance directive in chart           Family History   Problem Relation Age of Onset    Hypertension Mother         Benign Essential    Cancer Mother     Rectal cancer Mother     Hypertension Father         Benign Essential    Cancer Father     Diabetes Sister     Cirrhosis Daughter         Hepatic    Hepatitis Daughter         Type C Viral       Allergies   Allergen Reactions    Lisinopril Cough    Other      Seasonal allergies and animal fur         Current Outpatient Prescriptions:     albuterol (PROAIR HFA) 90 mcg/act inhaler, Inhale 2 puffs as needed  , Disp: , Rfl:     amLODIPine (NORVASC) 10 mg tablet, Take 1 tablet (10 mg total) by mouth daily, Disp: 90 tablet, Rfl: 1    aspirin 81 MG tablet, Take 1 tablet by mouth daily, Disp: , Rfl:     atorvastatin (LIPITOR) 40 mg tablet, Take 1 tablet (40 mg total) by mouth daily, Disp: 90 tablet, Rfl: 1    cetirizine (ZyrTEC) 10 mg tablet, Take 1 tablet by mouth daily, Disp: , Rfl:     Cholecalciferol (VITAMIN D3) 2000 units capsule, Take 1 capsule by mouth daily, Disp: , Rfl:     FLUoxetine (PROzac) 20 MG tablet, Take 1 tablet (20 mg total) by mouth daily, Disp: 90 tablet, Rfl: 1    fluticasone (FLONASE) 50 mcg/act nasal spray, 1 spray into each nostril as needed  , Disp: , Rfl:     hydrochlorothiazide (HYDRODIURIL) 25 mg tablet, Take 1 tablet (25 mg total) by mouth daily, Disp: 90 tablet, Rfl: 1    losartan (COZAAR) 100 MG tablet, Take 1 tablet (100 mg total) by mouth daily, Disp: 90 tablet, Rfl: 1    magnesium oxide (MAG-OX) 400 mg tablet, Take 1 tablet (400 mg total) by mouth daily (Patient not taking: Reported on 8/29/2018 ), Disp: 30 tablet, Rfl: 5    Potassium Chloride ER 20 MEQ TBCR, Take 1 tablet (20 mEq total) by mouth 2 (two) times a day, Disp: 60 tablet, Rfl: 1      Physical Exam:  /78 (BP Location: Right arm, Cuff Size: Standard)   Pulse 94   Temp (!) 97 1 °F (36 2 °C) (Oral)   Resp 18   Ht 5' 2" (1 575 m)   Wt 80 8 kg (178 lb 3 2 oz)   SpO2 96%   BMI 32 59 kg/m²     Physical Exam   Constitutional: She is oriented to person, place, and time  She appears well-developed and well-nourished  No distress  HENT:   Head: Normocephalic and atraumatic  Eyes: Conjunctivae are normal  No scleral icterus  Neck: Normal range of motion  Neck supple  Cardiovascular: Normal rate, regular rhythm and normal heart sounds  No murmur heard  Pulmonary/Chest: Effort normal and breath sounds normal  No respiratory distress  Abdominal: Soft  There is no tenderness  Musculoskeletal: Normal range of motion  She exhibits no edema or tenderness  Neurological: She is alert and oriented to person, place, and time   No cranial nerve deficit  Skin: Skin is warm and dry  Psychiatric: She has a normal mood and affect  Vitals reviewed  Labs:  Lab Results   Component Value Date    WBC 6 15 12/04/2018    HGB 12 5 12/04/2018    HCT 38 0 12/04/2018    MCV 90 12/04/2018     12/04/2018     Lab Results   Component Value Date     05/30/2015    K 2 9 (L) 12/04/2018    CL 92 (L) 12/04/2018    CO2 26 12/04/2018    ANIONGAP 6 05/30/2015    BUN 24 12/04/2018    CREATININE 1 23 12/04/2018    GLUCOSE 105 05/30/2015    GLUF 104 (H) 07/05/2018    CALCIUM 9 7 12/04/2018    AST 22 12/04/2018    ALT 21 12/04/2018    ALKPHOS 105 12/04/2018    PROT 8 5 (H) 05/30/2015    BILITOT 0 56 05/30/2015    EGFR 52 12/04/2018       Patient voiced understanding and agreement in the above discussion  Aware to contact our office with questions/symptoms in the interim

## 2018-12-10 ENCOUNTER — TELEPHONE (OUTPATIENT)
Dept: INTERNAL MEDICINE CLINIC | Facility: CLINIC | Age: 68
End: 2018-12-10

## 2018-12-10 NOTE — TELEPHONE ENCOUNTER
Tatiana Ku called and stated that she needed a refill on her potassium medication  I told her that it looked like the specialist prescribed that for her and that we wouldn't be the one to refill it  She had a hard time understanding but wanted to speak with nessa regarding her bp medication  I did relay that he is only in on Fridays  If someone can please look into this and give Ramos Larios her granddaughter a call back for clarification  Thank you

## 2018-12-11 NOTE — TELEPHONE ENCOUNTER
K+ was initially filled by heme/onc  Assessment plan is copied below:    K+ was for 10 days only, then pt needed a repeat BMP and follow-up with our office  No refill  Have pt get a BMP and schedule a follow-up in the next day or two to address hypokalemia  Additonally if we can determine her question regarding HTN medication - unless urgent should be addressed by provider at appt as nessa is not in office  THANKS!! Aidee Hilario 12/5/2018:  "The CMP prior to today's visit  This showed a potassium 2 9  She is taking hydrochlorothiazide 25 mg daily  She denies any change in appetite or diarrhea  She has not increased dose of hydrochlorothiazide at any time  She is not taking any potassium  Patient was given a prescription for potassium 40 mEq daily for 10 days  She is to repeat a BMP and follow-up with her PCP regarding continuing management  She would likely benefit from potassium 10 mEq daily along with her hydrochlorothiazide as this has become a chronic problem that patient's potassium decreases    I will send a copy of the note patient's PCP as well "

## 2018-12-11 NOTE — TELEPHONE ENCOUNTER
I called the patient and she counted the potassium pills that she has left  She has 14 tablets left  At taking 2 tablets daily, she will finish the 10 day course on Tuesday  She states that she has the slip for the blood test and she will go for the test on Wednesday  She will have her grand daughter call me to schedule the follow up appt  She does not know when she is available

## 2018-12-14 ENCOUNTER — TELEPHONE (OUTPATIENT)
Dept: INTERNAL MEDICINE CLINIC | Facility: CLINIC | Age: 68
End: 2018-12-14

## 2018-12-14 NOTE — TELEPHONE ENCOUNTER
Spoke with patient  She has increased K containing foods and is feeling well at this time  She started K supplement as directed by her heme/onc specialist and is going for follow up labs as directed  I discussed that she will likely need continued K supplement going forward and will need to follow up with our office after repeat labs are drawn  She is living with her grand-daughter and plans to keep follow up

## 2018-12-19 ENCOUNTER — TRANSCRIBE ORDERS (OUTPATIENT)
Dept: ADMINISTRATIVE | Age: 68
End: 2018-12-19

## 2018-12-21 ENCOUNTER — OFFICE VISIT (OUTPATIENT)
Dept: INTERNAL MEDICINE CLINIC | Age: 68
End: 2018-12-21
Payer: COMMERCIAL

## 2018-12-21 VITALS
WEIGHT: 178 LBS | HEIGHT: 63 IN | TEMPERATURE: 97.9 F | BODY MASS INDEX: 31.54 KG/M2 | DIASTOLIC BLOOD PRESSURE: 62 MMHG | HEART RATE: 86 BPM | SYSTOLIC BLOOD PRESSURE: 100 MMHG | OXYGEN SATURATION: 96 %

## 2018-12-21 DIAGNOSIS — E83.42 HYPOMAGNESEMIA: ICD-10-CM

## 2018-12-21 DIAGNOSIS — E11.9 TYPE 2 DIABETES MELLITUS WITHOUT COMPLICATION, WITHOUT LONG-TERM CURRENT USE OF INSULIN (HCC): ICD-10-CM

## 2018-12-21 DIAGNOSIS — Z23 NEED FOR INFLUENZA VACCINATION: ICD-10-CM

## 2018-12-21 DIAGNOSIS — K08.9 POOR DENTITION: ICD-10-CM

## 2018-12-21 DIAGNOSIS — J30.81 ALLERGIC RHINITIS DUE TO ANIMAL HAIR AND DANDER: ICD-10-CM

## 2018-12-21 DIAGNOSIS — E87.6 HYPOKALEMIA: ICD-10-CM

## 2018-12-21 DIAGNOSIS — I10 BENIGN ESSENTIAL HTN: Primary | ICD-10-CM

## 2018-12-21 PROCEDURE — 90471 IMMUNIZATION ADMIN: CPT

## 2018-12-21 PROCEDURE — 99214 OFFICE O/P EST MOD 30 MIN: CPT | Performed by: INTERNAL MEDICINE

## 2018-12-21 PROCEDURE — 90662 IIV NO PRSV INCREASED AG IM: CPT

## 2018-12-21 NOTE — ASSESSMENT & PLAN NOTE
Awaiting laboratory results  For now, continue with current antihypertensive regimen  Hold off on continuing potassium supplementation at this time  Will contact her granddaughter once we receive results of BMP

## 2018-12-21 NOTE — ASSESSMENT & PLAN NOTE
Lab Results   Component Value Date    HGBA1C 6 3 07/05/2018        continue with lifestyle modifications

## 2018-12-21 NOTE — ASSESSMENT & PLAN NOTE
Continue with Flonase and Zyrtec  She exhibits bilateral superficial cervical lymphadenopathy, which likely is due to allergic rhinitis  Will continue to monitor  If lymphadenopathy persists, will order ultrasound of the neck

## 2018-12-21 NOTE — ASSESSMENT & PLAN NOTE
Well controlled  Continue with current regimen, losartan 100 mg daily, hydrochlorothiazide 25 mg daily, amlodipine 10 mg daily

## 2018-12-21 NOTE — PROGRESS NOTES
Assessment/Plan:    Poor dentition  Advise she go to a dentist      Type 2 diabetes mellitus without complication, without long-term current use of insulin ([facility] [address] )  Lab Results   Component Value Date    HGBA1C 6 3 07/05/2018        continue with lifestyle modifications  Allergic rhinitis  Continue with Flonase and Zyrtec  She exhibits bilateral superficial cervical lymphadenopathy, which likely is due to allergic rhinitis  Will continue to monitor  If lymphadenopathy persists, will order ultrasound of the neck  Benign essential HTN  Well controlled  Continue with current regimen, losartan 100 mg daily, hydrochlorothiazide 25 mg daily, amlodipine 10 mg daily  Hypokalemia  Awaiting laboratory results  For now, continue with current antihypertensive regimen  Hold off on continuing potassium supplementation at this time  Will contact her granddaughter once we receive results of BMP  Diagnoses and all orders for this visit:    Benign essential HTN  -     Basic metabolic panel; Future  -     Magnesium; Future    Type 2 diabetes mellitus without complication, without long-term current use of insulin ([facility])    Hypokalemia  -     Basic metabolic panel; Future  -     Magnesium; Future    Poor dentition    Allergic rhinitis due to animal hair and dander    Hypomagnesemia  -     Magnesium; Future          Subjective:      Patient ID: Ayala Winters is a 76 y o  female  76year old female is seen today for follow up of hypokalemia  Potassium was 2 9 on 12/04/2018 as was found by her oncologist, Dr Dagmar Hays, 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  Upon recognition of potassium level, she was started on potassium 20 mEq BID supplementation  She was also advised to increase dietary potassium  She denies any muscle spasms/twitching, fatigue, muscle weakness  Her hypokalemia was attributed to her diuretic therapy, hydrochlorothiazide, which was continued            Hypertension   This is a chronic problem  The current episode started more than 1 year ago  The problem is unchanged  The problem is controlled  Pertinent negatives include no chest pain, headaches, palpitations or shortness of breath  Risk factors for coronary artery disease include post-menopausal state and sedentary lifestyle  Past treatments include angiotensin blockers, diuretics and calcium channel blockers  The current treatment provides moderate improvement  There are no compliance problems  The following portions of the patient's history were reviewed and updated as appropriate: allergies, current medications, past family history, past medical history, past social history, past surgical history and problem list     Review of Systems   Constitutional: Negative for activity change, appetite change, chills, diaphoresis, fatigue and fever  HENT: Negative for congestion, postnasal drip, rhinorrhea, sinus pain, sinus pressure, sneezing and sore throat  Eyes: Negative for visual disturbance  Respiratory: Negative for apnea, cough, choking, chest tightness, shortness of breath and wheezing  Cardiovascular: Negative for chest pain, palpitations and leg swelling  Gastrointestinal: Negative for abdominal distention, abdominal pain, anal bleeding, blood in stool, constipation, diarrhea, nausea and vomiting  Endocrine: Negative for cold intolerance and heat intolerance  Genitourinary: Negative for difficulty urinating, dysuria and hematuria  Musculoskeletal: Negative  Skin: Negative  Neurological: Negative for dizziness, weakness, light-headedness, numbness and headaches  Hematological: Negative for adenopathy  Psychiatric/Behavioral: Negative for agitation, sleep disturbance and suicidal ideas  All other systems reviewed and are negative          Past Medical History:   Diagnosis Date    Anxiety     Arthritis     B-complex deficiency     Glaucoma     Gout     Hypercalcemia     Hyperlipidemia Last assessed: 8/5/15    Irregular heart beat     Memory loss     Palpitations     Suicidal ideation          Current Outpatient Prescriptions:     albuterol (PROAIR HFA) 90 mcg/act inhaler, Inhale 2 puffs as needed  , Disp: , Rfl:     amLODIPine (NORVASC) 10 mg tablet, Take 1 tablet (10 mg total) by mouth daily, Disp: 90 tablet, Rfl: 1    aspirin 81 MG tablet, Take 1 tablet by mouth daily, Disp: , Rfl:     atorvastatin (LIPITOR) 40 mg tablet, Take 1 tablet (40 mg total) by mouth daily, Disp: 90 tablet, Rfl: 1    cetirizine (ZyrTEC) 10 mg tablet, Take 1 tablet by mouth daily, Disp: , Rfl:     Cholecalciferol (VITAMIN D3) 2000 units capsule, Take 1 capsule by mouth daily, Disp: , Rfl:     FLUoxetine (PROzac) 20 MG tablet, Take 1 tablet (20 mg total) by mouth daily, Disp: 90 tablet, Rfl: 1    fluticasone (FLONASE) 50 mcg/act nasal spray, 1 spray into each nostril as needed  , Disp: , Rfl:     hydrochlorothiazide (HYDRODIURIL) 25 mg tablet, Take 1 tablet (25 mg total) by mouth daily, Disp: 90 tablet, Rfl: 1    losartan (COZAAR) 100 MG tablet, Take 1 tablet (100 mg total) by mouth daily, Disp: 90 tablet, Rfl: 1    Potassium Chloride ER 20 MEQ TBCR, Take 1 tablet (20 mEq total) by mouth 2 (two) times a day (Patient not taking: Reported on 12/21/2018 ), Disp: 20 tablet, Rfl: 0    Allergies   Allergen Reactions    Lisinopril Cough    Other      Seasonal allergies and animal fur       Social History   Past Surgical History:   Procedure Laterality Date    TUBAL LIGATION      VAGINAL DELIVERY      x2     Family History   Problem Relation Age of Onset    Hypertension Mother         Benign Essential    Cancer Mother     Rectal cancer Mother     Hypertension Father         Benign Essential    Cancer Father     Diabetes Sister     Cirrhosis Daughter         Hepatic    Hepatitis Daughter         Type C Viral       Objective:  /62 (BP Location: Left arm, Patient Position: Sitting, Cuff Size: Standard)   Pulse 86   Temp 97 9 °F (36 6 °C) (Tympanic)   Ht 5' 2 6" (1 59 m) Comment: shoes off  Wt 80 7 kg (178 lb) Comment: shoes off  SpO2 96%   BMI 31 94 kg/m²     Recent Results (from the past 1344 hour(s))   CBC and differential    Collection Time: 12/04/18  5:40 PM   Result Value Ref Range    WBC 6 15 4 31 - 10 16 Thousand/uL    RBC 4 22 3 81 - 5 12 Million/uL    Hemoglobin 12 5 11 5 - 15 4 g/dL    Hematocrit 38 0 34 8 - 46 1 %    MCV 90 82 - 98 fL    MCH 29 6 26 8 - 34 3 pg    MCHC 32 9 31 4 - 37 4 g/dL    RDW 12 8 11 6 - 15 1 %    MPV 9 0 8 9 - 12 7 fL    Platelets 810 142 - 454 Thousands/uL    nRBC 0 /100 WBCs    Neutrophils Relative 43 43 - 75 %    Immat GRANS % 0 0 - 2 %    Lymphocytes Relative 37 14 - 44 %    Monocytes Relative 14 (H) 4 - 12 %    Eosinophils Relative 5 0 - 6 %    Basophils Relative 1 0 - 1 %    Neutrophils Absolute 2 65 1 85 - 7 62 Thousands/µL    Immature Grans Absolute 0 01 0 00 - 0 20 Thousand/uL    Lymphocytes Absolute 2 25 0 60 - 4 47 Thousands/µL    Monocytes Absolute 0 83 0 17 - 1 22 Thousand/µL    Eosinophils Absolute 0 33 0 00 - 0 61 Thousand/µL    Basophils Absolute 0 08 0 00 - 0 10 Thousands/µL   Comprehensive metabolic panel    Collection Time: 12/04/18  5:40 PM   Result Value Ref Range    Sodium 128 (L) 136 - 145 mmol/L    Potassium 2 9 (L) 3 5 - 5 3 mmol/L    Chloride 92 (L) 100 - 108 mmol/L    CO2 26 21 - 32 mmol/L    ANION GAP 10 4 - 13 mmol/L    BUN 24 5 - 25 mg/dL    Creatinine 1 23 0 60 - 1 30 mg/dL    Glucose 104 65 - 140 mg/dL    Calcium 9 7 8 3 - 10 1 mg/dL    AST 22 5 - 45 U/L    ALT 21 12 - 78 U/L    Alkaline Phosphatase 105 46 - 116 U/L    Total Protein 9 0 (H) 6 4 - 8 2 g/dL    Albumin 3 8 3 5 - 5 0 g/dL    Total Bilirubin 0 63 0 20 - 1 00 mg/dL    eGFR 52 ml/min/1 73sq m   C-reactive protein    Collection Time: 12/04/18  5:40 PM   Result Value Ref Range    CRP <3 0 <3 0 mg/L   Sedimentation rate, automated    Collection Time: 12/04/18  5:40 PM   Result Value Ref Range    Sed Rate 28 (H) 0 - 20 mm/hour   IgG, IgA, IgM    Collection Time: 12/04/18  5:40 PM   Result Value Ref Range     0 (H) 70 0 - 400 0 mg/dL    IGG 1,930 0 (H) 700 0-1,600 0 mg/dL    IGM 81 0 40 0 - 230 0 mg/dL            Physical Exam   Constitutional: She is oriented to person, place, and time  She appears well-developed and well-nourished  No distress  HENT:   Head: Normocephalic and atraumatic  Mouth/Throat:       Eyes: Pupils are equal, round, and reactive to light  Conjunctivae and EOM are normal  Right eye exhibits no discharge  Left eye exhibits no discharge  Neck: Normal range of motion  Neck supple  No JVD present  No thyromegaly present  Cardiovascular: Normal rate, regular rhythm, normal heart sounds and intact distal pulses  Exam reveals no gallop and no friction rub  No murmur heard  Pulmonary/Chest: Effort normal and breath sounds normal  No respiratory distress  She has no wheezes  She has no rales  She exhibits no tenderness  Abdominal: Soft  She exhibits no distension  There is no tenderness  Musculoskeletal: Normal range of motion  She exhibits no edema, tenderness or deformity  Lymphadenopathy:     She has cervical adenopathy  Right cervical: Superficial cervical adenopathy present  Left cervical: Superficial cervical adenopathy present  Neurological: She is alert and oriented to person, place, and time  No cranial nerve deficit  Coordination normal    Skin: Skin is warm and dry  No rash noted  She is not diaphoretic  No erythema  No pallor  Psychiatric: She has a normal mood and affect  Her behavior is normal  Judgment and thought content normal    Nursing note and vitals reviewed

## 2019-01-04 DIAGNOSIS — E78.5 DYSLIPIDEMIA: ICD-10-CM

## 2019-01-04 RX ORDER — ATORVASTATIN CALCIUM 40 MG/1
40 TABLET, FILM COATED ORAL DAILY
Qty: 90 TABLET | Refills: 0 | Status: SHIPPED | OUTPATIENT
Start: 2019-01-04 | End: 2019-04-01 | Stop reason: SDUPTHER

## 2019-01-04 NOTE — PROGRESS NOTES
Spoke with patient  Appears that labs were run at urgent care  K returned to normal   She has been off K supplement since your visit  I requested she go for repeat BMP with mag now that she has been off of the supplement and will consider adding back K supplement based on repeat labs  I reviewed your note and wanted to keep you aware       Thanks  Angel Land

## 2019-01-14 ENCOUNTER — TELEPHONE (OUTPATIENT)
Dept: INTERNAL MEDICINE CLINIC | Facility: CLINIC | Age: 69
End: 2019-01-14

## 2019-01-14 NOTE — TELEPHONE ENCOUNTER
Patient notified she can schedule with the [address] now that the order is in [facility] \[facility] [place] [facility]\"", reminded of appt as requested    Thank you!!!

## 2019-01-14 NOTE — TELEPHONE ENCOUNTER
Patient states she needs a referral for a 6mth sylvia follow up-  She states she is told to come back every 6mths to follow up on the images  She is due in January      Thank you

## 2019-01-14 NOTE — TELEPHONE ENCOUNTER
This is an active order in the system for this patient  Please call and let her know  Please remind patient of her follow-up with our office on 1/25/19

## 2019-01-17 ENCOUNTER — HOSPITAL ENCOUNTER (OUTPATIENT)
Dept: MAMMOGRAPHY | Facility: CLINIC | Age: 69
Discharge: HOME | End: 2019-01-17
Payer: COMMERCIAL

## 2019-01-17 ENCOUNTER — TRANSCRIBE ORDERS (OUTPATIENT)
Dept: MAMMOGRAPHY | Facility: CLINIC | Age: 69
End: 2019-01-17

## 2019-01-17 ENCOUNTER — HOSPITAL ENCOUNTER (OUTPATIENT)
Dept: ULTRASOUND IMAGING | Facility: CLINIC | Age: 69
Discharge: HOME | End: 2019-01-17
Payer: COMMERCIAL

## 2019-01-17 VITALS — BODY MASS INDEX: 32.2 KG/M2 | WEIGHT: 175 LBS | HEIGHT: 62 IN

## 2019-01-17 DIAGNOSIS — R92.8 ABNORMAL MAMMOGRAM: Primary | ICD-10-CM

## 2019-01-17 DIAGNOSIS — R92.8 FOLLOW-UP EXAMINATION OF ABNORMAL MAMMOGRAM: ICD-10-CM

## 2019-01-17 DIAGNOSIS — R92.8 ABNORMAL MAMMOGRAM: ICD-10-CM

## 2019-01-17 PROCEDURE — G0279 TOMOSYNTHESIS, MAMMO: HCPCS

## 2019-01-17 PROCEDURE — 76642 ULTRASOUND BREAST LIMITED: CPT

## 2019-01-17 PROCEDURE — 77065 DX MAMMO INCL CAD UNI: CPT

## 2019-01-24 ENCOUNTER — LAB (OUTPATIENT)
Dept: LAB | Facility: CLINIC | Age: 69
End: 2019-01-24
Payer: COMMERCIAL

## 2019-01-24 DIAGNOSIS — E78.5 DYSLIPIDEMIA: ICD-10-CM

## 2019-01-24 LAB
ANION GAP SERPL CALCULATED.3IONS-SCNC: 7 MMOL/L (ref 4–13)
BUN SERPL-MCNC: 19 MG/DL (ref 5–25)
CALCIUM SERPL-MCNC: 9.3 MG/DL (ref 8.3–10.1)
CHLORIDE SERPL-SCNC: 99 MMOL/L (ref 100–108)
CO2 SERPL-SCNC: 28 MMOL/L (ref 21–32)
CREAT SERPL-MCNC: 1.08 MG/DL (ref 0.6–1.3)
GFR SERPL CREATININE-BSD FRML MDRD: 61 ML/MIN/1.73SQ M
GLUCOSE SERPL-MCNC: 108 MG/DL (ref 65–140)
MAGNESIUM SERPL-MCNC: 2 MG/DL (ref 1.6–2.6)
POTASSIUM SERPL-SCNC: 3 MMOL/L (ref 3.5–5.3)
SODIUM SERPL-SCNC: 134 MMOL/L (ref 136–145)

## 2019-01-24 PROCEDURE — 36415 COLL VENOUS BLD VENIPUNCTURE: CPT

## 2019-01-24 PROCEDURE — 80048 BASIC METABOLIC PNL TOTAL CA: CPT

## 2019-01-24 PROCEDURE — 83735 ASSAY OF MAGNESIUM: CPT

## 2019-01-25 ENCOUNTER — TELEPHONE (OUTPATIENT)
Dept: INTERNAL MEDICINE CLINIC | Facility: CLINIC | Age: 69
End: 2019-01-25

## 2019-01-25 ENCOUNTER — HOSPITAL ENCOUNTER (EMERGENCY)
Facility: HOSPITAL | Age: 69
Discharge: HOME | End: 2019-01-25
Attending: EMERGENCY MEDICINE
Payer: COMMERCIAL

## 2019-01-25 ENCOUNTER — OFFICE VISIT (OUTPATIENT)
Dept: INTERNAL MEDICINE CLINIC | Facility: CLINIC | Age: 69
End: 2019-01-25
Payer: COMMERCIAL

## 2019-01-25 VITALS
DIASTOLIC BLOOD PRESSURE: 61 MMHG | RESPIRATION RATE: 16 BRPM | HEART RATE: 63 BPM | BODY MASS INDEX: 32.76 KG/M2 | TEMPERATURE: 97 F | WEIGHT: 178 LBS | SYSTOLIC BLOOD PRESSURE: 128 MMHG | OXYGEN SATURATION: 96 % | HEIGHT: 62 IN

## 2019-01-25 VITALS
DIASTOLIC BLOOD PRESSURE: 66 MMHG | SYSTOLIC BLOOD PRESSURE: 98 MMHG | HEIGHT: 62 IN | OXYGEN SATURATION: 96 % | HEART RATE: 76 BPM | WEIGHT: 178 LBS | TEMPERATURE: 98.2 F | BODY MASS INDEX: 32.76 KG/M2

## 2019-01-25 DIAGNOSIS — I95.89 HYPOTENSION, IATROGENIC: ICD-10-CM

## 2019-01-25 DIAGNOSIS — E87.6 HYPOKALEMIA: Primary | ICD-10-CM

## 2019-01-25 DIAGNOSIS — R53.83 FATIGUE: ICD-10-CM

## 2019-01-25 DIAGNOSIS — R42 DIZZINESS: ICD-10-CM

## 2019-01-25 DIAGNOSIS — I10 ESSENTIAL HYPERTENSION, BENIGN: ICD-10-CM

## 2019-01-25 DIAGNOSIS — E87.6 HYPOKALEMIA: ICD-10-CM

## 2019-01-25 LAB
ANION GAP SERPL CALCULATED.3IONS-SCNC: 6 MMOL/L (ref 4–13)
ATRIAL RATE: 67 BPM
BASOPHILS # BLD AUTO: 0.07 THOUSANDS/ΜL (ref 0–0.1)
BASOPHILS NFR BLD AUTO: 1 % (ref 0–1)
BUN SERPL-MCNC: 23 MG/DL (ref 5–25)
CALCIUM SERPL-MCNC: 9.4 MG/DL (ref 8.3–10.1)
CHLORIDE SERPL-SCNC: 101 MMOL/L (ref 100–108)
CO2 SERPL-SCNC: 28 MMOL/L (ref 21–32)
CREAT SERPL-MCNC: 1.32 MG/DL (ref 0.6–1.3)
EOSINOPHIL # BLD AUTO: 0.39 THOUSAND/ΜL (ref 0–0.61)
EOSINOPHIL NFR BLD AUTO: 7 % (ref 0–6)
ERYTHROCYTE [DISTWIDTH] IN BLOOD BY AUTOMATED COUNT: 13.2 % (ref 11.6–15.1)
GFR SERPL CREATININE-BSD FRML MDRD: 48 ML/MIN/1.73SQ M
GLUCOSE SERPL-MCNC: 108 MG/DL (ref 65–140)
HCT VFR BLD AUTO: 38.8 % (ref 34.8–46.1)
HGB BLD-MCNC: 12.7 G/DL (ref 11.5–15.4)
IMM GRANULOCYTES # BLD AUTO: 0.01 THOUSAND/UL (ref 0–0.2)
IMM GRANULOCYTES NFR BLD AUTO: 0 % (ref 0–2)
LYMPHOCYTES # BLD AUTO: 1.62 THOUSANDS/ΜL (ref 0.6–4.47)
LYMPHOCYTES NFR BLD AUTO: 29 % (ref 14–44)
MAGNESIUM SERPL-MCNC: 2.1 MG/DL (ref 1.6–2.6)
MCH RBC QN AUTO: 29.2 PG (ref 26.8–34.3)
MCHC RBC AUTO-ENTMCNC: 32.7 G/DL (ref 31.4–37.4)
MCV RBC AUTO: 89 FL (ref 82–98)
MONOCYTES # BLD AUTO: 0.64 THOUSAND/ΜL (ref 0.17–1.22)
MONOCYTES NFR BLD AUTO: 11 % (ref 4–12)
NEUTROPHILS # BLD AUTO: 2.95 THOUSANDS/ΜL (ref 1.85–7.62)
NEUTS SEG NFR BLD AUTO: 52 % (ref 43–75)
NRBC BLD AUTO-RTO: 0 /100 WBCS
P AXIS: 79 DEGREES
PLATELET # BLD AUTO: 312 THOUSANDS/UL (ref 149–390)
PMV BLD AUTO: 9 FL (ref 8.9–12.7)
POTASSIUM SERPL-SCNC: 3.3 MMOL/L (ref 3.5–5.3)
PR INTERVAL: 156 MS
QRS AXIS: 6 DEGREES
QRSD INTERVAL: 76 MS
QT INTERVAL: 418 MS
QTC INTERVAL: 441 MS
RBC # BLD AUTO: 4.35 MILLION/UL (ref 3.81–5.12)
SODIUM SERPL-SCNC: 135 MMOL/L (ref 136–145)
T WAVE AXIS: 35 DEGREES
VENTRICULAR RATE: 67 BPM
WBC # BLD AUTO: 5.68 THOUSAND/UL (ref 4.31–10.16)

## 2019-01-25 PROCEDURE — 80048 BASIC METABOLIC PNL TOTAL CA: CPT | Performed by: EMERGENCY MEDICINE

## 2019-01-25 PROCEDURE — 83735 ASSAY OF MAGNESIUM: CPT | Performed by: EMERGENCY MEDICINE

## 2019-01-25 PROCEDURE — 93005 ELECTROCARDIOGRAM TRACING: CPT

## 2019-01-25 PROCEDURE — 99284 EMERGENCY DEPT VISIT MOD MDM: CPT

## 2019-01-25 PROCEDURE — 85025 COMPLETE CBC W/AUTO DIFF WBC: CPT | Performed by: EMERGENCY MEDICINE

## 2019-01-25 PROCEDURE — 93000 ELECTROCARDIOGRAM COMPLETE: CPT | Performed by: PHYSICIAN ASSISTANT

## 2019-01-25 PROCEDURE — 36415 COLL VENOUS BLD VENIPUNCTURE: CPT | Performed by: EMERGENCY MEDICINE

## 2019-01-25 PROCEDURE — 93010 ELECTROCARDIOGRAM REPORT: CPT | Performed by: INTERNAL MEDICINE

## 2019-01-25 PROCEDURE — 99213 OFFICE O/P EST LOW 20 MIN: CPT | Performed by: PHYSICIAN ASSISTANT

## 2019-01-25 RX ORDER — POTASSIUM CHLORIDE 20 MEQ/1
20 TABLET, EXTENDED RELEASE ORAL DAILY
Qty: 7 TABLET | Refills: 0 | Status: SHIPPED | OUTPATIENT
Start: 2019-01-25 | End: 2019-01-29 | Stop reason: SDUPTHER

## 2019-01-25 RX ORDER — POTASSIUM CHLORIDE 20 MEQ/1
20 TABLET, EXTENDED RELEASE ORAL ONCE
Status: COMPLETED | OUTPATIENT
Start: 2019-01-25 | End: 2019-01-25

## 2019-01-25 RX ORDER — HYDROCHLOROTHIAZIDE 12.5 MG/1
12.5 TABLET ORAL DAILY
Qty: 30 TABLET | Refills: 1 | Status: SHIPPED | OUTPATIENT
Start: 2019-01-25 | End: 2019-05-21 | Stop reason: SDUPTHER

## 2019-01-25 RX ORDER — POTASSIUM CHLORIDE 20 MEQ/1
20 TABLET, EXTENDED RELEASE ORAL DAILY
Qty: 7 TABLET | Refills: 0 | Status: SHIPPED | OUTPATIENT
Start: 2019-01-25 | End: 2019-01-25 | Stop reason: SDUPTHER

## 2019-01-25 RX ADMIN — POTASSIUM CHLORIDE 20 MEQ: 1500 TABLET, EXTENDED RELEASE ORAL at 10:57

## 2019-01-25 NOTE — TELEPHONE ENCOUNTER
Pt's daugther call to let you know the ER gave her a 10 day supply of potasium, they did an ekg at the ER

## 2019-01-25 NOTE — PATIENT INSTRUCTIONS
Dizziness  Patient presents today to the office for persistent dizziness  She previously had been treated with a 7 to ten-day course of potassium for hypokalemia however has been without potassium since  Potassium was checked yesterday along with magnesium  Potassium level was 3 0  Magnesium level was normal   Suspect component of orthostatic hypotension contributing to her dizziness however with multiple comorbidities and hypokalemia will elect to send her to the emergency room ([facility]) for further evaluation and potassium repletion  Upon discharge from the emergency room if stable would make recommendation of reduction versus discontinuation of her hydrochlorothiazide as it is likely affecting her potassium and blood pressure  Depending on what her labs look like at the time of discharge and the plans at her made with her blood pressure medication she may need ongoing potassium supplement    She will need close office follow-up upon ER visit    Hypotension, iatrogenic  See plan as discussed above regarding BP medications

## 2019-01-25 NOTE — TELEPHONE ENCOUNTER
Called to discuss with patient  No answer  Reviewed ER note where patient was given K supplement to take at home  I would like for the patient to reduce HCTZ to 12 5 mg daily (cut her 25 mg tablet and take only 1/2 daily)  Continue with the K supplement as it was written in the ER  Left message for granddaughter requesting return call to our office and left dosing instructions for HCTZ change  I want to see that she got this message and understands    Thanks Lulu Berkowitz

## 2019-01-25 NOTE — PROGRESS NOTES
[facility] [place]  Standard Office Visit  Patient ID: Yoanna Covarrubias    : 1950  Age/Gender: 76 y o  female     DATE: 2019      Assessment/Plan:    Dizziness  Patient presents today to the office for persistent dizziness  She previously had been treated with a 7 to ten-day course of potassium for hypokalemia however has been without potassium since  Potassium was checked yesterday along with magnesium  Potassium level was 3 0  Magnesium level was normal   Suspect component of orthostatic hypotension contributing to her dizziness however with multiple comorbidities and hypokalemia will elect to send her to the emergency room ([facility]) for further evaluation and potassium repletion  Upon discharge from the emergency room if stable would make recommendation of reduction versus discontinuation of her hydrochlorothiazide as it is likely affecting her potassium and blood pressure  Depending on what her labs look like at the time of discharge and the plans at her made with her blood pressure medication she may need ongoing potassium supplement  She will need close office follow-up upon ER visit    Hypotension, iatrogenic  See plan as discussed above regarding BP medications       Diagnoses and all orders for this visit:    Hypokalemia  -     POCT ECG    Dizziness    Hypotension, iatrogenic  Comments:  Patient sent to emergency room  Advised her to call us back upon discharge for further instructions regarding blood pressure medication and potassium          Subjective:   Chief Complaint   Patient presents with    Follow-up     Patient is here for f/u blood work results  Pt c/o dizziness for a while it is getting  Yoanna Covarrubias is a 76 y o  female who presents to the office on 2019 for     For eval of dizziness  She notes she feels less sure of herself  Losing her balance  Notes she feels weak  No vertigo    No problems with hearing or vision  Grand-daughter notes poor diet as of late  Does not regularly check her BS  Dizzy upon quick movements or quick standing  Has been getting lightheaded more as of late  She has been on her current blood pressure medications for some time and have not recently been changed  She notes that she saw her hematologist last month and was given a course of potassium  She completed the course of potassium and her labs improved  She has not been on daily potassium since and had labs checked yesterday  She notes she is due for her follow-up breast imaging for previous abnormal finding  Granddaughter present and notes this imaging will be repeated as scheduled      Dizziness   This is a recurrent problem  The current episode started more than 1 month ago  The problem has been gradually worsening  Associated symptoms include weakness  Pertinent negatives include no abdominal pain, anorexia, arthralgias, chest pain, chills, congestion, coughing, diaphoresis, fever, nausea, numbness, urinary symptoms, visual change or vomiting  Exacerbated by: standing  She has tried nothing for the symptoms  The treatment provided no relief  The following portions of the patient's history were reviewed and updated as appropriate: allergies, current medications, past family history, past medical history, past social history, past surgical history and problem list     Review of Systems   Constitutional: Negative for chills, diaphoresis and fever  HENT: Negative for congestion  Respiratory: Negative for cough, shortness of breath and wheezing  No cough no wheeze no shortness of breath   Cardiovascular: Negative for chest pain and palpitations  No chest pain no palpitation   Gastrointestinal: Negative for abdominal pain, anorexia, nausea and vomiting  Genitourinary: Negative for dysuria  Musculoskeletal: Negative for arthralgias  Neurological: Positive for dizziness and weakness  Negative for numbness           Patient Active Problem List   Diagnosis    Abnormal mammogram of right breast    Allergic rhinitis    Benign essential HTN    Depression    DJD (degenerative joint disease) of knee    Dyslipidemia    Hypokalemia    Osteoarthritis of right knee    Skin lesion of cheek    Tremor    Type 2 diabetes mellitus without complication, without long-term current use of insulin (HCC)    Abnormal mammogram    Screening for colon cancer    Poor dentition    Body mass index (BMI) of 33 0-33 9 in adult    Need for shingles vaccine    Elevated blood protein    Urinary frequency    Abnormal SPEP    Calculus of gallbladder without cholecystitis without obstruction    Dizziness    Hypotension, iatrogenic       Past Medical History:   Diagnosis Date    Anxiety     Arthritis     B-complex deficiency     Glaucoma     Gout     Hypercalcemia     Hyperlipidemia     Last assessed: 8/5/15    Irregular heart beat     Memory loss     Palpitations     Suicidal ideation        Past Surgical History:   Procedure Laterality Date    TUBAL LIGATION      VAGINAL DELIVERY      x2         Current Outpatient Prescriptions:     albuterol (PROAIR HFA) 90 mcg/act inhaler, Inhale 2 puffs as needed  , Disp: , Rfl:     amLODIPine (NORVASC) 10 mg tablet, Take 1 tablet (10 mg total) by mouth daily, Disp: 90 tablet, Rfl: 1    aspirin 81 MG tablet, Take 1 tablet by mouth daily, Disp: , Rfl:     atorvastatin (LIPITOR) 40 mg tablet, Take 1 tablet (40 mg total) by mouth daily, Disp: 90 tablet, Rfl: 0    cetirizine (ZyrTEC) 10 mg tablet, Take 1 tablet by mouth daily, Disp: , Rfl:     Cholecalciferol (VITAMIN D3) 2000 units capsule, Take 1 capsule by mouth daily, Disp: , Rfl:     FLUoxetine (PROzac) 20 MG tablet, Take 1 tablet (20 mg total) by mouth daily, Disp: 90 tablet, Rfl: 1    fluticasone (FLONASE) 50 mcg/act nasal spray, 1 spray into each nostril as needed  , Disp: , Rfl:    hydrochlorothiazide (HYDRODIURIL) 25 mg tablet, Take 1 tablet (25 mg total) by mouth daily, Disp: 90 tablet, Rfl: 1    losartan (COZAAR) 100 MG tablet, Take 1 tablet (100 mg total) by mouth daily, Disp: 90 tablet, Rfl: 1    potassium chloride (K-DUR,KLOR-CON) 20 mEq tablet, Take 1 tablet (20 mEq total) by mouth daily for 7 days, Disp: 7 tablet, Rfl: 0    Potassium Chloride ER 20 MEQ TBCR, Take 1 tablet (20 mEq total) by mouth 2 (two) times a day (Patient not taking: Reported on 12/21/2018 ), Disp: 20 tablet, Rfl: 0  No current facility-administered medications for this visit       Allergies   Allergen Reactions    Lisinopril Cough    Other      Seasonal allergies and animal fur       Social History     Social History    Marital status: /Civil Union     Spouse name: N/A    Number of children: N/A    Years of education: N/A     Social History Main Topics    Smoking status: Never Smoker    Smokeless tobacco: Never Used    Alcohol use No    Drug use: No    Sexual activity: Not Asked     Other Topics Concern    None     Social History Narrative    Advance directive in chart           Family History   Problem Relation Age of Onset    Hypertension Mother         Benign Essential    Cancer Mother     Rectal cancer Mother     Colon cancer Mother     Hypertension Father         Benign Essential    Cancer Father     Diabetes Sister     Cirrhosis Daughter         Hepatic    Hepatitis Daughter         Type C Viral    Pancreatic cancer Cousin        PHQ-9 Depression Screening    PHQ-9:    Frequency of the following problems over the past two weeks:              Health Maintenance   Topic Date Due    DM Eye Exam  05/02/1960    DTaP,Tdap,and Td Vaccines (1 - Tdap) 05/02/1971    Pneumococcal PPSV23/PCV13 65+ Years / Low and Medium Risk (2 of 2 - PPSV23) 11/19/2017    URINE MICROALBUMIN  11/07/2018    HEMOGLOBIN A1C  01/05/2019    Fall Risk  05/17/2019    Urinary Incontinence Screening  05/17/2019  Diabetic Foot Exam  07/13/2019    MAMMOGRAM  01/17/2021    CRC Screening: Colonoscopy  03/30/2027    Hepatitis C Screening  Completed    INFLUENZA VACCINE  Completed       Immunization History   Administered Date(s) Administered    Influenza 11/19/2012, 10/31/2013, 01/14/2015, 03/09/2016, 03/15/2017, 11/10/2017    Influenza Quadrivalent Preservative Free 3 years and older IM 01/14/2015    Influenza Split 11/15/2012, 10/31/2013    Influenza Split High Dose Preservative Free IM 03/09/2016, 03/15/2017, 11/10/2017    Influenza, high dose seasonal 0 5 mL 12/21/2018    Pneumococcal Conjugate 13-Valent 07/29/2016    Pneumococcal Polysaccharide PPV23 11/19/2012        Objective:  Vitals:    01/25/19 0742 01/25/19 0844 01/25/19 0845   BP: 100/70 102/72 98/66   BP Location: Left arm Left arm Left arm   Patient Position: Sitting Sitting Sitting   Cuff Size: Standard     Pulse: 76     Temp: 98 2 °F (36 8 °C)     SpO2: 96%     Weight: 80 7 kg (178 lb)     Height: 5' 2" (1 575 m)       Wt Readings from Last 3 Encounters:   01/25/19 80 7 kg (178 lb)   01/25/19 80 7 kg (178 lb)   01/17/19 79 4 kg (175 lb)     Body mass index is 32 56 kg/m²  No exam data present       Physical Exam   Constitutional: She is oriented to person, place, and time  She appears well-developed and well-nourished  No distress  Alert pleasant cooperative  Seated in room in no acute distress   HENT:   Head: Normocephalic and atraumatic  Mouth/Throat: Oropharynx is clear and moist  No oropharyngeal exudate  Moist mucous membranes  Normal posterior pharynx   Eyes: Pupils are equal, round, and reactive to light  Right eye exhibits no discharge  Left eye exhibits no discharge  No scleral icterus  Neck: Neck supple  Cardiovascular: Normal rate, regular rhythm and normal heart sounds  No murmur heard  Pulmonary/Chest: Effort normal and breath sounds normal  No respiratory distress  She has no wheezes  She has no rales     Clear to auscultation  No crackles no rhonchi no wheeze  No respiratory distress   Abdominal: Soft  Bowel sounds are normal  There is no tenderness  There is no guarding  Soft nontender nondistended positive bowel sounds   Musculoskeletal: She exhibits no edema  No calf tenderness to palpation   Neurological: She is alert and oriented to person, place, and time  No gross focal neurologic deficits   Skin: Skin is warm and dry  She is not diaphoretic  Psychiatric: She has a normal mood and affect  Her behavior is normal  Thought content normal    Nursing note and vitals reviewed            Future Appointments  Date Time Provider Armand Johanseni   1/29/2019 4:00 PM Marjan Villasenor MD [address] [place] [address]   6/5/2019 1:00 PM Cheryl Pierce PA-C HEM [facility]C [facility]   7/18/2019 12:00 PM BE MAMMO RBC 3 BE RBC Mammo BE [facility]       Katie Jarquin PA-C   [address]    Patient Care Team:  Katie Jarquin PA-C as PCP - General (Internal Medicine)  Katie Jarquin PA-C

## 2019-01-25 NOTE — ASSESSMENT & PLAN NOTE
Patient presents today to the office for persistent dizziness  She previously had been treated with a 7 to ten-day course of potassium for hypokalemia however has been without potassium since  Potassium was checked yesterday along with magnesium  Potassium level was 3 0  Magnesium level was normal   Suspect component of orthostatic hypotension contributing to her dizziness however with multiple comorbidities and hypokalemia will elect to send her to the emergency room ([facility]) for further evaluation and potassium repletion  Upon discharge from the emergency room if stable would make recommendation of reduction versus discontinuation of her hydrochlorothiazide as it is likely affecting her potassium and blood pressure  Depending on what her labs look like at the time of discharge and the plans at her made with her blood pressure medication she may need ongoing potassium supplement    She will need close office follow-up upon ER visit

## 2019-01-25 NOTE — TELEPHONE ENCOUNTER
Spoke with grand-daughter  K was sent to pharmacy but the [facility] they use does not have it in stock  She prefers it [facility] ot [facility] in [place]  K 20mEq po daily for 7 days sent to [facility]  Also new Rx sent for HCTZ 12 5mg po daily sent to same pharmacy  Grand-daughter aware of HCTZ dose change and will see that patient changes from 25mg daily to 12 5 mg daily  She will see patient takes K supplement as directed as well  [facility]

## 2019-01-25 NOTE — ED ATTENDING ATTESTATION
Debi Sal DO, saw and evaluated the patient  I have discussed the patient with the resident/non-physician practitioner and agree with the resident's/non-physician practitioner's findings, Plan of Care, and MDM as documented in the resident's/non-physician practitioner's note, except where noted  All available labs and Radiology studies were reviewed  At this point I agree with the current assessment done in the Emergency Department  I have conducted an independent evaluation of this patient a history and physical is as follows:    76 female presenting emergency department for fatigue and episodes of lightheadedness and concern for low potassium level  Patient was seen by her primary care physician yesterday because of concern potassium level might be low as she is on HCT Z  Patient states she currently feels better is currently denies any episodes of light is for fatigue  She denies any chest pain or shortness of breath  Patient denies any focal motor sensory neurological deficits  Denies any nausea vomiting abdominal pain  Denies any lower extremity edema or calf tenderness bilaterally  Patient had been on potassium supplementation previously but was taken off the last month  Blood pressure 128/61  Otherwise benign physical exam, lungs are clear, there is no lower extremity edema or calf tenderness, abdomen soft nontender nondistended, heart is regular without murmurs or gallops  Plan is to repeat labs including a BMP and a magnesium level and corrected any underlying electrolyte abnormalities necessary  Slight elevation in renal function  With normal blood pressure at this time, recommended to discuss with her primary care physician about cutting her or ARB and half and only taking 50 mg daily  Patient follow up with primary care physician  Ambulatory upon discharge without difficulty  Orthostatic vital signs within normal range        Critical Care Time  Stevelanre Time    Procedures

## 2019-01-25 NOTE — ED PROVIDER NOTES
History  Chief Complaint   Patient presents with    Dizziness     pt reports dizziness and low potassium since starting her HCTZ medication  states she was sent in by her family doctor     77 yo F presents to ED for fatigue, lightheadedness, and hypokalemia  Pt says she was sent in by her PCP because her potassium might be low  Pt currently denies any symptoms  She says she feels well  Denies any lightheadedness or fatigue at this time  Denies chest pain, shortness of breath, abdominal pain, nausea/vomiting  Pt says she was previously on potassium supplementation for months but then was taken off of it within the last month  She does not know why she was taken off of it  Pt on hctz, amlodipine, and losartan for blood pressure  She is unsure how long she has been taking them  Prior to Admission Medications   Prescriptions Last Dose Informant Patient Reported? Taking?    Cholecalciferol (VITAMIN D3) 2000 units capsule 1/25/2019 at Unknown time Self Yes Yes   Sig: Take 1 capsule by mouth daily   FLUoxetine (PROzac) 20 MG tablet 1/25/2019 at Unknown time  No Yes   Sig: Take 1 tablet (20 mg total) by mouth daily   Potassium Chloride ER 20 MEQ TBCR Not Taking at Unknown time  No No   Sig: Take 1 tablet (20 mEq total) by mouth 2 (two) times a day   Patient not taking: Reported on 12/21/2018    albuterol (PROAIR HFA) 90 mcg/act inhaler 1/25/2019 at Unknown time Self Yes Yes   Sig: Inhale 2 puffs as needed     amLODIPine (NORVASC) 10 mg tablet 1/25/2019 at Unknown time  No Yes   Sig: Take 1 tablet (10 mg total) by mouth daily   aspirin 81 MG tablet 1/25/2019 at Unknown time Self Yes Yes   Sig: Take 1 tablet by mouth daily   atorvastatin (LIPITOR) 40 mg tablet 1/25/2019 at Unknown time  No Yes   Sig: Take 1 tablet (40 mg total) by mouth daily   cetirizine (ZyrTEC) 10 mg tablet 1/25/2019 at Unknown time Self Yes Yes   Sig: Take 1 tablet by mouth daily   fluticasone (FLONASE) 50 mcg/act nasal spray 1/25/2019 at Unknown time Self Yes Yes   Si spray into each nostril as needed     hydrochlorothiazide (HYDRODIURIL) 25 mg tablet 2019 at Unknown time  No Yes   Sig: Take 1 tablet (25 mg total) by mouth daily   losartan (COZAAR) 100 MG tablet 2019 at Unknown time  No Yes   Sig: Take 1 tablet (100 mg total) by mouth daily      Facility-Administered Medications: None       Past Medical History:   Diagnosis Date    Anxiety     Arthritis     B-complex deficiency     Glaucoma     Gout     Hypercalcemia     Hyperlipidemia     Last assessed: 8/5/15    Irregular heart beat     Memory loss     Palpitations     Suicidal ideation        Past Surgical History:   Procedure Laterality Date    TUBAL LIGATION      VAGINAL DELIVERY      x2       Family History   Problem Relation Age of Onset    Hypertension Mother         Benign Essential    Cancer Mother     Rectal cancer Mother     Colon cancer Mother     Hypertension Father         Benign Essential    Cancer Father     Diabetes Sister     Cirrhosis Daughter         Hepatic    Hepatitis Daughter         Type C Viral    Pancreatic cancer Cousin      I have reviewed and agree with the history as documented  Social History   Substance Use Topics    Smoking status: Never Smoker    Smokeless tobacco: Never Used    Alcohol use No        Review of Systems   Constitutional: Negative for activity change, chills and fever  HENT: Negative for congestion, rhinorrhea, sore throat and trouble swallowing  Eyes: Negative for pain, discharge and visual disturbance  Respiratory: Negative for cough, chest tightness and shortness of breath  Cardiovascular: Negative for chest pain, palpitations and leg swelling  Gastrointestinal: Negative for abdominal pain, nausea and vomiting  Genitourinary: Negative for dysuria, frequency and urgency  Musculoskeletal: Negative for gait problem, neck pain and neck stiffness     Skin: Negative for color change, rash and wound    Neurological: Negative for dizziness, syncope, light-headedness and headaches  Physical Exam  ED Triage Vitals [01/25/19 0926]   Temperature Pulse Respirations Blood Pressure SpO2   (!) 97 °F (36 1 °C) 71 18 130/75 98 %      Temp Source Heart Rate Source Patient Position - Orthostatic VS BP Location FiO2 (%)   Oral Monitor Sitting Left arm --      Pain Score       No Pain           Orthostatic Vital Signs  Vitals:    01/25/19 0959 01/25/19 1000 01/25/19 1001 01/25/19 1030   BP: 134/77 124/58 118/58 128/61   Pulse: 66 78 69 63   Patient Position - Orthostatic VS: Sitting Lying Standing Lying       Physical Exam   Constitutional: She is oriented to person, place, and time  She appears well-developed and well-nourished  No distress  HENT:   Head: Normocephalic and atraumatic  Mouth/Throat: Oropharynx is clear and moist    Eyes: Conjunctivae and EOM are normal  Right eye exhibits no discharge  Left eye exhibits no discharge  Neck: Normal range of motion  Neck supple  Cardiovascular: Normal rate, regular rhythm and intact distal pulses  Pulmonary/Chest: Effort normal and breath sounds normal  No stridor  No respiratory distress  She exhibits no tenderness  Abdominal: Soft  There is no tenderness  There is no rebound and no guarding  Musculoskeletal: Normal range of motion  She exhibits no edema or tenderness  Neurological: She is alert and oriented to person, place, and time  No sensory deficit  She exhibits normal muscle tone  Skin: Skin is warm and dry  Capillary refill takes less than 2 seconds  Nursing note and vitals reviewed        ED Medications  Medications   potassium chloride (K-DUR,KLOR-CON) CR tablet 20 mEq (20 mEq Oral Given 1/25/19 1057)       Diagnostic Studies  Results Reviewed     Procedure Component Value Units Date/Time    Basic metabolic panel [[id number]]  (Abnormal) Collected:  01/25/19 0958    Lab Status:  Final result Specimen:  Blood from Arm, Left Updated: 01/25/19 1029     Sodium 135 (L) mmol/L      Potassium 3 3 (L) mmol/L      Chloride 101 mmol/L      CO2 28 mmol/L      ANION GAP 6 mmol/L      BUN 23 mg/dL      Creatinine 1 32 (H) mg/dL      Glucose 108 mg/dL      Calcium 9 4 mg/dL      eGFR 48 ml/min/1 73sq m     Narrative:         National Kidney Disease Education Program recommendations are as follows:  GFR calculation is accurate only with a steady state creatinine  Chronic Kidney disease less than 60 ml/min/1 73 sq  meters  Kidney failure less than 15 ml/min/1 73 sq  meters      Magnesium [[id number]]  (Normal) Collected:  01/25/19 0958    Lab Status:  Final result Specimen:  Blood from Arm, Left Updated:  01/25/19 1029     Magnesium 2 1 mg/dL     CBC and differential [[id number]]  (Abnormal) Collected:  01/25/19 0958    Lab Status:  Final result Specimen:  Blood from Arm, Left Updated:  01/25/19 1005     WBC 5 68 Thousand/uL      RBC 4 35 Million/uL      Hemoglobin 12 7 g/dL      Hematocrit 38 8 %      MCV 89 fL      MCH 29 2 pg      MCHC 32 7 g/dL      RDW 13 2 %      MPV 9 0 fL      Platelets 558 Thousands/uL      nRBC 0 /100 WBCs      Neutrophils Relative 52 %      Immat GRANS % 0 %      Lymphocytes Relative 29 %      Monocytes Relative 11 %      Eosinophils Relative 7 (H) %      Basophils Relative 1 %      Neutrophils Absolute 2 95 Thousands/µL      Immature Grans Absolute 0 01 Thousand/uL      Lymphocytes Absolute 1 62 Thousands/µL      Monocytes Absolute 0 64 Thousand/µL      Eosinophils Absolute 0 39 Thousand/µL      Basophils Absolute 0 07 Thousands/µL     POCT urinalysis dipstick [[id number]]     Lab Status:  No result Specimen:  Urine                  No orders to display         Procedures  ECG 12 Lead Documentation  Date/Time: 1/25/2019 11:03 AM  Performed by: Lorna Morel  Authorized by: Lorna Morel     Indications / Diagnosis:  Hypokalemia  ECG reviewed by me, the ED Provider: yes    Patient location:  ED  Interpretation:     Interpretation: normal Rate:     ECG rate:  67    ECG rate assessment: normal    Rhythm:     Rhythm: sinus rhythm    Conduction:     Conduction: normal    ST segments:     ST segments:  Normal  T waves:     T waves: normal            Phone Consults  ED Phone Contact    ED Course                               MDM  Number of Diagnoses or Management Options  Fatigue:   Hypokalemia:   Diagnosis management comments: Normotensive in the ED  Negative orthostatic vital signs  Pt states she feels well, asymptomatic  Benign exam  No EKG changes  Potassium 3 3 today, mildly hypokalemic  Will give po potassium today and restart pt on potassium supplementation  Will start at 20meq daily (pt previously was on bid)  Will give Rx for 7 days, to be continued by PCP if indicated  Close follow up with PCP  Will defer to PCP to adjust chronic blood pressure medications  Return precautions discussed  CritCare Time    Disposition  Final diagnoses:   Hypokalemia   Fatigue     Time reflects when diagnosis was documented in both MDM as applicable and the Disposition within this note     Time User Action Codes Description Comment    1/25/2019 10:42 AM Michael Enter Add [E87 6] Hypokalemia     1/25/2019 10:42 AM Michael Enter Add [R53 83] Fatigue       ED Disposition     ED Disposition Condition Comment    Discharge  Simone Samanta discharge to home/self care      Condition at discharge: Stable        Follow-up Information     Follow up With Specialties Details Why [address] [facility] Emergency Department Emergency Medicine  As needed, If symptoms worsen [address]  [phone]  ED, [address] [place] [address], [address] [facility] Internal Medicine, Physician Assistant Schedule an appointment as soon as possible for a visit in 3 days for follow up blood pressure, hypokalemia management [address]  [phone] Patient's Medications   Discharge Prescriptions    POTASSIUM CHLORIDE (K-DUR,KLOR-CON) 20 MEQ TABLET    Take 1 tablet (20 mEq total) by mouth daily for 7 days       Start Date: 1/25/2019 End Date: 2/1/2019       Order Dose: 20 mEq       Quantity: 7 tablet    Refills: 0     No discharge procedures on file  ED Provider  Attending physically available and evaluated Michael Ritter I managed the patient along with the ED Attending      Electronically Signed by         Ese Dyer MD  01/25/19 8457

## 2019-01-25 NOTE — DISCHARGE INSTRUCTIONS
Hypokalemia   WHAT YOU NEED TO KNOW:   Hypokalemia is a low level of potassium in your blood  Potassium helps control how your muscles, heart, and digestive system work  Hypokalemia occurs when your body loses too much potassium or does not absorb enough from food  DISCHARGE INSTRUCTIONS:   Return to the emergency department if:   · You cannot move your arm or leg  · You have a fast or irregular heartbeat  · You are too tired or weak to stand up  Contact your healthcare provider if:   · You are vomiting, or you have diarrhea  · You have numbness or tingling in your arms or legs  · Your symptoms do not go away or they get worse  · You have questions or concerns about your condition or care  Medicines:   · Potassium  will be given to bring your potassium levels back to normal     · Take your medicine as directed  Contact your healthcare provider if you think your medicine is not helping or if you have side effects  Tell him of her if you are allergic to any medicine  Keep a list of the medicines, vitamins, and herbs you take  Include the amounts, and when and why you take them  Bring the list or the pill bottles to follow-up visits  Carry your medicine list with you in case of an emergency  Eat foods that are high in potassium:  Foods that are high in potassium include bananas, oranges, tomatoes, potatoes, and avocado  Zhou beans, turkey, salmon, lean beef, yogurt, and milk are also high in potassium  Ask your healthcare provider or dietitian for more information about foods that are high in potassium  Follow up with your healthcare provider as directed:  Write down your questions so you remember to ask them during your visits  © 2017 [facility] Information is for End User's use only and may not be sold, redistributed or otherwise used for commercial purposes   All illustrations and images included in CareNotes® are the copyrighted property of [employer]  or [employer]  The above information is an  only  It is not intended as medical advice for individual conditions or treatments  Talk to your doctor, nurse or pharmacist before following any medical regimen to see if it is safe and effective for you

## 2019-01-25 NOTE — ED NOTES
Lying:      BP- 134/77  HR- 66    Sitting:     BP- 124/58  HR- 78    Standing:     BP- 118/58  HR- [id number] Tiger Thomas, RN  01/25/19 1010

## 2019-01-29 ENCOUNTER — OFFICE VISIT (OUTPATIENT)
Dept: INTERNAL MEDICINE CLINIC | Facility: CLINIC | Age: 69
End: 2019-01-29
Payer: COMMERCIAL

## 2019-01-29 VITALS
HEART RATE: 78 BPM | BODY MASS INDEX: 32.9 KG/M2 | WEIGHT: 178.8 LBS | HEIGHT: 62 IN | DIASTOLIC BLOOD PRESSURE: 78 MMHG | SYSTOLIC BLOOD PRESSURE: 118 MMHG | OXYGEN SATURATION: 96 % | TEMPERATURE: 97.9 F

## 2019-01-29 DIAGNOSIS — E87.6 HYPOKALEMIA: ICD-10-CM

## 2019-01-29 DIAGNOSIS — I10 BENIGN ESSENTIAL HTN: Primary | ICD-10-CM

## 2019-01-29 PROBLEM — I95.89 HYPOTENSION, IATROGENIC: Status: RESOLVED | Noted: 2019-01-25 | Resolved: 2019-01-29

## 2019-01-29 PROCEDURE — 3074F SYST BP LT 130 MM HG: CPT | Performed by: INTERNAL MEDICINE

## 2019-01-29 PROCEDURE — 1160F RVW MEDS BY RX/DR IN RCRD: CPT | Performed by: INTERNAL MEDICINE

## 2019-01-29 PROCEDURE — 99213 OFFICE O/P EST LOW 20 MIN: CPT | Performed by: INTERNAL MEDICINE

## 2019-01-29 PROCEDURE — 3008F BODY MASS INDEX DOCD: CPT | Performed by: INTERNAL MEDICINE

## 2019-01-29 PROCEDURE — 1036F TOBACCO NON-USER: CPT | Performed by: INTERNAL MEDICINE

## 2019-01-29 PROCEDURE — 3078F DIAST BP <80 MM HG: CPT | Performed by: INTERNAL MEDICINE

## 2019-01-29 RX ORDER — POTASSIUM CHLORIDE 20 MEQ/1
20 TABLET, EXTENDED RELEASE ORAL DAILY
Qty: 14 TABLET | Refills: 0 | Status: SHIPPED | OUTPATIENT
Start: 2019-01-29 | End: 2019-02-05

## 2019-01-29 NOTE — ASSESSMENT & PLAN NOTE
Continue with current regimen, HCTZ 12 5 mg daily, amlodipine 10 mg daily, and losartan 100 mg daily  Continue with potassium supplementation

## 2019-01-29 NOTE — PROGRESS NOTES
Assessment/Plan:    Benign essential HTN  Continue with current regimen, HCTZ 12 5 mg daily, amlodipine 10 mg daily, and losartan 100 mg daily  Continue with potassium supplementation  Hypokalemia  Continue with potassium supplementation, currently on 20 mEq daily  Will recheck BMP  Diagnoses and all orders for this visit:    Benign essential HTN  -     Basic metabolic panel; Future    Hypokalemia  -     Basic metabolic panel; Future  -     potassium chloride (K-DUR,KLOR-CON) 20 mEq tablet; Take 1 tablet (20 mEq total) by mouth daily          Subjective:      Patient ID: Brenda Yost is a 76 y o  female  59-year-old female is seen today for 1 week follow-up after she was seen last week for having dizziness and hypokalemia  She was sent to the emergency department to which she received potassium replacement therapy  Potassium was found to be low at 3 3  She was discharged that day to home and given potassium supplementation to take daily until today's appointment  No updated laboratory studies to review today  Magnesium was 2 1  Her current antihypertensive regimen includes hydrochlorothiazide 12 5 mg daily, losartan 100 mg daily, and amlodipine 10 mg daily  Her hydrochlorothiazide was decreased from 25 mcg daily to now 12 5 mg daily  Hypertension   This is a chronic problem  The current episode started more than 1 year ago  The problem is unchanged  The problem is controlled  Pertinent negatives include no anxiety, blurred vision, chest pain, headaches, malaise/fatigue, neck pain, orthopnea, palpitations, peripheral edema, PND, shortness of breath or sweats  Past treatments include diuretics, calcium channel blockers and angiotensin blockers  The current treatment provides moderate improvement  Compliance problems include exercise  Diabetes   Pertinent negatives for hypoglycemia include no dizziness, headaches or sweats   Pertinent negatives for diabetes include no blurred vision, no chest pain, no fatigue and no weakness  The following portions of the patient's history were reviewed and updated as appropriate: allergies, current medications, past family history, past medical history, past social history, past surgical history and problem list     Review of Systems   Constitutional: Negative for activity change, appetite change, chills, diaphoresis, fatigue, fever and malaise/fatigue  HENT: Negative for congestion, postnasal drip, rhinorrhea, sinus pain, sinus pressure, sneezing and sore throat  Eyes: Negative for blurred vision and visual disturbance  Respiratory: Negative for apnea, cough, choking, chest tightness, shortness of breath and wheezing  Cardiovascular: Negative for chest pain, palpitations, orthopnea, leg swelling and PND  Gastrointestinal: Negative for abdominal distention, abdominal pain, anal bleeding, blood in stool, constipation, diarrhea, nausea and vomiting  Endocrine: Negative for cold intolerance and heat intolerance  Genitourinary: Negative for difficulty urinating, dysuria and hematuria  Musculoskeletal: Negative  Negative for neck pain  Skin: Negative  Neurological: Negative for dizziness, weakness, light-headedness, numbness and headaches  Hematological: Negative for adenopathy  Psychiatric/Behavioral: Negative for agitation, sleep disturbance and suicidal ideas  All other systems reviewed and are negative          Past Medical History:   Diagnosis Date    Anxiety     Arthritis     B-complex deficiency     Depression     Diabetes mellitus ([facility] [address] )     Glaucoma     Gout     Hypercalcemia     Hyperlipidemia     Last assessed: 8/5/15    Irregular heart beat     Memory loss     Palpitations     Suicidal ideation          Current Outpatient Prescriptions:     albuterol (PROAIR HFA) 90 mcg/act inhaler, Inhale 2 puffs as needed  , Disp: , Rfl:     amLODIPine (NORVASC) 10 mg tablet, Take 1 tablet (10 mg total) by mouth daily, Disp: 90 tablet, Rfl: 1    aspirin 81 MG tablet, Take 1 tablet by mouth daily, Disp: , Rfl:     atorvastatin (LIPITOR) 40 mg tablet, Take 1 tablet (40 mg total) by mouth daily, Disp: 90 tablet, Rfl: 0    cetirizine (ZyrTEC) 10 mg tablet, Take 1 tablet by mouth daily, Disp: , Rfl:     Cholecalciferol (VITAMIN D3) 2000 units capsule, Take 1 capsule by mouth daily, Disp: , Rfl:     FLUoxetine (PROzac) 20 MG tablet, Take 1 tablet (20 mg total) by mouth daily, Disp: 90 tablet, Rfl: 1    fluticasone (FLONASE) 50 mcg/act nasal spray, 1 spray into each nostril as needed  , Disp: , Rfl:     hydrochlorothiazide (HYDRODIURIL) 12 5 mg tablet, Take 1 tablet (12 5 mg total) by mouth daily, Disp: 30 tablet, Rfl: 1    losartan (COZAAR) 100 MG tablet, Take 1 tablet (100 mg total) by mouth daily, Disp: 90 tablet, Rfl: 1    potassium chloride (K-DUR,KLOR-CON) 20 mEq tablet, Take 1 tablet (20 mEq total) by mouth daily, Disp: 14 tablet, Rfl: 0    Allergies   Allergen Reactions    Lisinopril Cough    Other      Seasonal allergies and animal fur       Social History   Past Surgical History:   Procedure Laterality Date    TUBAL LIGATION      VAGINAL DELIVERY      x2     Family History   Problem Relation Age of Onset    Hypertension Mother         Benign Essential    Cancer Mother     Rectal cancer Mother     Colon cancer Mother     Hypertension Father         Benign Essential    Cancer Father     Diabetes Sister     Cirrhosis Daughter         Hepatic    Hepatitis Daughter         Type C Viral    Pancreatic cancer Cousin        Objective:  /78 (BP Location: Left arm, Patient Position: Sitting, Cuff Size: Adult)   Pulse 78   Temp 97 9 °F (36 6 °C) (Oral)   Ht 5' 2" (1 575 m)   Wt 81 1 kg (178 lb 12 8 oz)   SpO2 96%   BMI 32 70 kg/m²     Recent Results (from the past 1344 hour(s))   CBC and differential    Collection Time: 12/04/18  5:40 PM   Result Value Ref Range    WBC 6 15 4 31 - 10 16 Thousand/uL    RBC 4  22 3 81 - 5 12 Million/uL    Hemoglobin 12 5 11 5 - 15 4 g/dL    Hematocrit 38 0 34 8 - 46 1 %    MCV 90 82 - 98 fL    MCH 29 6 26 8 - 34 3 pg    MCHC 32 9 31 4 - 37 4 g/dL    RDW 12 8 11 6 - 15 1 %    MPV 9 0 8 9 - 12 7 fL    Platelets 058 324 - 406 Thousands/uL    nRBC 0 /100 WBCs    Neutrophils Relative 43 43 - 75 %    Immat GRANS % 0 0 - 2 %    Lymphocytes Relative 37 14 - 44 %    Monocytes Relative 14 (H) 4 - 12 %    Eosinophils Relative 5 0 - 6 %    Basophils Relative 1 0 - 1 %    Neutrophils Absolute 2 65 1 85 - 7 62 Thousands/µL    Immature Grans Absolute 0 01 0 00 - 0 20 Thousand/uL    Lymphocytes Absolute 2 25 0 60 - 4 47 Thousands/µL    Monocytes Absolute 0 83 0 17 - 1 22 Thousand/µL    Eosinophils Absolute 0 33 0 00 - 0 61 Thousand/µL    Basophils Absolute 0 08 0 00 - 0 10 Thousands/µL   Comprehensive metabolic panel    Collection Time: 12/04/18  5:40 PM   Result Value Ref Range    Sodium 128 (L) 136 - 145 mmol/L    Potassium 2 9 (L) 3 5 - 5 3 mmol/L    Chloride 92 (L) 100 - 108 mmol/L    CO2 26 21 - 32 mmol/L    ANION GAP 10 4 - 13 mmol/L    BUN 24 5 - 25 mg/dL    Creatinine 1 23 0 60 - 1 30 mg/dL    Glucose 104 65 - 140 mg/dL    Calcium 9 7 8 3 - 10 1 mg/dL    AST 22 5 - 45 U/L    ALT 21 12 - 78 U/L    Alkaline Phosphatase 105 46 - 116 U/L    Total Protein 9 0 (H) 6 4 - 8 2 g/dL    Albumin 3 8 3 5 - 5 0 g/dL    Total Bilirubin 0 63 0 20 - 1 00 mg/dL    eGFR 52 ml/min/1 73sq m   C-reactive protein    Collection Time: 12/04/18  5:40 PM   Result Value Ref Range    CRP <3 0 <3 0 mg/L   Sedimentation rate, automated    Collection Time: 12/04/18  5:40 PM   Result Value Ref Range    Sed Rate 28 (H) 0 - 20 mm/hour   IgG, IgA, IgM    Collection Time: 12/04/18  5:40 PM   Result Value Ref Range     0 (H) 70 0 - 400 0 mg/dL    IGG 1,930 0 (H) 700 0-1,600 0 mg/dL    IGM 81 0 40 0 - 230 0 mg/dL   Basic metabolic panel    Collection Time: 01/24/19  2:55 PM   Result Value Ref Range    Sodium 134 (L) 136 - 145 mmol/L    Potassium 3 0 (L) 3 5 - 5 3 mmol/L    Chloride 99 (L) 100 - 108 mmol/L    CO2 28 21 - 32 mmol/L    ANION GAP 7 4 - 13 mmol/L    BUN 19 5 - 25 mg/dL    Creatinine 1 08 0 60 - 1 30 mg/dL    Glucose 108 65 - 140 mg/dL    Calcium 9 3 8 3 - 10 1 mg/dL    eGFR 61 ml/min/1 73sq m   Magnesium    Collection Time: 01/24/19  2:55 PM   Result Value Ref Range    Magnesium 2 0 1 6 - 2 6 mg/dL   CBC and differential    Collection Time: 01/25/19  9:58 AM   Result Value Ref Range    WBC 5 68 4 31 - 10 16 Thousand/uL    RBC 4 35 3 81 - 5 12 Million/uL    Hemoglobin 12 7 11 5 - 15 4 g/dL    Hematocrit 38 8 34 8 - 46 1 %    MCV 89 82 - 98 fL    MCH 29 2 26 8 - 34 3 pg    MCHC 32 7 31 4 - 37 4 g/dL    RDW 13 2 11 6 - 15 1 %    MPV 9 0 8 9 - 12 7 fL    Platelets 905 056 - 700 Thousands/uL    nRBC 0 /100 WBCs    Neutrophils Relative 52 43 - 75 %    Immat GRANS % 0 0 - 2 %    Lymphocytes Relative 29 14 - 44 %    Monocytes Relative 11 4 - 12 %    Eosinophils Relative 7 (H) 0 - 6 %    Basophils Relative 1 0 - 1 %    Neutrophils Absolute 2 95 1 85 - 7 62 Thousands/µL    Immature Grans Absolute 0 01 0 00 - 0 20 Thousand/uL    Lymphocytes Absolute 1 62 0 60 - 4 47 Thousands/µL    Monocytes Absolute 0 64 0 17 - 1 22 Thousand/µL    Eosinophils Absolute 0 39 0 00 - 0 61 Thousand/µL    Basophils Absolute 0 07 0 00 - 0 10 Thousands/µL   Basic metabolic panel    Collection Time: 01/25/19  9:58 AM   Result Value Ref Range    Sodium 135 (L) 136 - 145 mmol/L    Potassium 3 3 (L) 3 5 - 5 3 mmol/L    Chloride 101 100 - 108 mmol/L    CO2 28 21 - 32 mmol/L    ANION GAP 6 4 - 13 mmol/L    BUN 23 5 - 25 mg/dL    Creatinine 1 32 (H) 0 60 - 1 30 mg/dL    Glucose 108 65 - 140 mg/dL    Calcium 9 4 8 3 - 10 1 mg/dL    eGFR 48 ml/min/1 73sq m   Magnesium    Collection Time: 01/25/19  9:58 AM   Result Value Ref Range    Magnesium 2 1 1 6 - 2 6 mg/dL   ECG 12 lead    Collection Time: 01/25/19  9:58 AM   Result Value Ref Range    Ventricular Rate 67 BPM Atrial Rate 67 BPM    OR Interval 156 ms    QRSD Interval 76 ms    QT Interval 418 ms    QTC Interval 441 ms    P Axis 79 degrees    QRS Axis 6 degrees    T Wave Axis 35 degrees            Physical Exam   Constitutional: She is oriented to person, place, and time  She appears well-developed and well-nourished  No distress  HENT:   Head: Normocephalic and atraumatic  Eyes: Pupils are equal, round, and reactive to light  Conjunctivae and EOM are normal  Right eye exhibits no discharge  Left eye exhibits no discharge  Neck: Normal range of motion  Neck supple  No JVD present  No thyromegaly present  Cardiovascular: Normal rate, regular rhythm, normal heart sounds and intact distal pulses  Exam reveals no gallop and no friction rub  No murmur heard  Pulmonary/Chest: Effort normal and breath sounds normal  No respiratory distress  She has no wheezes  She has no rales  She exhibits no tenderness  Abdominal: Soft  She exhibits no distension  There is no tenderness  Musculoskeletal: Normal range of motion  She exhibits no edema, tenderness or deformity  Lymphadenopathy:     She has no cervical adenopathy  Neurological: She is alert and oriented to person, place, and time  No cranial nerve deficit  Coordination normal    Skin: Skin is warm and dry  No rash noted  She is not diaphoretic  No erythema  No pallor  Psychiatric: She has a normal mood and affect  Her behavior is normal  Judgment and thought content normal    Nursing note and vitals reviewed

## 2019-02-04 ENCOUNTER — APPOINTMENT (OUTPATIENT)
Dept: LAB | Facility: MEDICAL CENTER | Age: 69
End: 2019-02-04
Payer: COMMERCIAL

## 2019-02-04 DIAGNOSIS — E87.6 HYPOKALEMIA: ICD-10-CM

## 2019-02-04 DIAGNOSIS — E83.42 HYPOMAGNESEMIA: ICD-10-CM

## 2019-02-04 DIAGNOSIS — D72.819 LEUKOPENIA, UNSPECIFIED TYPE: ICD-10-CM

## 2019-02-04 DIAGNOSIS — I10 BENIGN ESSENTIAL HTN: ICD-10-CM

## 2019-02-04 DIAGNOSIS — R77.9 ELEVATED BLOOD PROTEIN: ICD-10-CM

## 2019-02-04 LAB
ANION GAP SERPL CALCULATED.3IONS-SCNC: 5 MMOL/L (ref 4–13)
BASOPHILS # BLD AUTO: 0.05 THOUSANDS/ΜL (ref 0–0.1)
BASOPHILS NFR BLD AUTO: 1 % (ref 0–1)
BUN SERPL-MCNC: 18 MG/DL (ref 5–25)
CALCIUM SERPL-MCNC: 9.6 MG/DL (ref 8.3–10.1)
CHLORIDE SERPL-SCNC: 102 MMOL/L (ref 100–108)
CO2 SERPL-SCNC: 27 MMOL/L (ref 21–32)
CREAT SERPL-MCNC: 1.03 MG/DL (ref 0.6–1.3)
EOSINOPHIL # BLD AUTO: 0.37 THOUSAND/ΜL (ref 0–0.61)
EOSINOPHIL NFR BLD AUTO: 7 % (ref 0–6)
ERYTHROCYTE [DISTWIDTH] IN BLOOD BY AUTOMATED COUNT: 13.2 % (ref 11.6–15.1)
GFR SERPL CREATININE-BSD FRML MDRD: 65 ML/MIN/1.73SQ M
GLUCOSE P FAST SERPL-MCNC: 90 MG/DL (ref 65–99)
HCT VFR BLD AUTO: 38 % (ref 34.8–46.1)
HGB BLD-MCNC: 12.2 G/DL (ref 11.5–15.4)
IMM GRANULOCYTES # BLD AUTO: 0 THOUSAND/UL (ref 0–0.2)
IMM GRANULOCYTES NFR BLD AUTO: 0 % (ref 0–2)
LYMPHOCYTES # BLD AUTO: 2.05 THOUSANDS/ΜL (ref 0.6–4.47)
LYMPHOCYTES NFR BLD AUTO: 41 % (ref 14–44)
MAGNESIUM SERPL-MCNC: 2 MG/DL (ref 1.6–2.6)
MCH RBC QN AUTO: 29 PG (ref 26.8–34.3)
MCHC RBC AUTO-ENTMCNC: 32.1 G/DL (ref 31.4–37.4)
MCV RBC AUTO: 90 FL (ref 82–98)
MONOCYTES # BLD AUTO: 0.66 THOUSAND/ΜL (ref 0.17–1.22)
MONOCYTES NFR BLD AUTO: 13 % (ref 4–12)
NEUTROPHILS # BLD AUTO: 1.95 THOUSANDS/ΜL (ref 1.85–7.62)
NEUTS SEG NFR BLD AUTO: 38 % (ref 43–75)
NRBC BLD AUTO-RTO: 0 /100 WBCS
PLATELET # BLD AUTO: 320 THOUSANDS/UL (ref 149–390)
PMV BLD AUTO: 9.3 FL (ref 8.9–12.7)
POTASSIUM SERPL-SCNC: 4.1 MMOL/L (ref 3.5–5.3)
RBC # BLD AUTO: 4.21 MILLION/UL (ref 3.81–5.12)
SODIUM SERPL-SCNC: 134 MMOL/L (ref 136–145)
TSH SERPL DL<=0.05 MIU/L-ACNC: 0.71 UIU/ML (ref 0.36–3.74)
WBC # BLD AUTO: 5.08 THOUSAND/UL (ref 4.31–10.16)

## 2019-02-04 PROCEDURE — 84166 PROTEIN E-PHORESIS/URINE/CSF: CPT | Performed by: PATHOLOGY

## 2019-02-04 PROCEDURE — 84443 ASSAY THYROID STIM HORMONE: CPT

## 2019-02-04 PROCEDURE — 84165 PROTEIN E-PHORESIS SERUM: CPT | Performed by: PATHOLOGY

## 2019-02-04 PROCEDURE — 80048 BASIC METABOLIC PNL TOTAL CA: CPT

## 2019-02-04 PROCEDURE — 84165 PROTEIN E-PHORESIS SERUM: CPT

## 2019-02-04 PROCEDURE — 36415 COLL VENOUS BLD VENIPUNCTURE: CPT

## 2019-02-04 PROCEDURE — 83735 ASSAY OF MAGNESIUM: CPT

## 2019-02-04 PROCEDURE — 85025 COMPLETE CBC W/AUTO DIFF WBC: CPT

## 2019-02-04 PROCEDURE — 84166 PROTEIN E-PHORESIS/URINE/CSF: CPT | Performed by: PHYSICIAN ASSISTANT

## 2019-02-05 DIAGNOSIS — I10 BENIGN ESSENTIAL HTN: ICD-10-CM

## 2019-02-05 DIAGNOSIS — E87.6 HYPOKALEMIA: Primary | ICD-10-CM

## 2019-02-05 RX ORDER — POTASSIUM CHLORIDE 750 MG/1
10 TABLET, EXTENDED RELEASE ORAL DAILY
Qty: 30 TABLET | Refills: 1 | Status: SHIPPED | OUTPATIENT
Start: 2019-02-13 | End: 2019-05-21 | Stop reason: SDUPTHER

## 2019-02-05 NOTE — PROGRESS NOTES
Laboratory studies reviewed  Potassium improved to 4 1  Clinical staff will contact patient to notify her of my recommendations which will be to continue with current antihypertensive regimen and to start potassium supplementation at at 10 mEq daily while on hydrochlorothiazide  Recheck BMP prior to next appointment on 04/30/2019

## 2019-02-06 LAB
ALBUMIN SERPL ELPH-MCNC: 4.01 G/DL (ref 3.5–5)
ALBUMIN SERPL ELPH-MCNC: 49.5 % (ref 52–65)
ALBUMIN UR ELPH-MCNC: 100 %
ALPHA1 GLOB MFR UR ELPH: 0 %
ALPHA1 GLOB SERPL ELPH-MCNC: 0.28 G/DL (ref 0.1–0.4)
ALPHA1 GLOB SERPL ELPH-MCNC: 3.5 % (ref 2.5–5)
ALPHA2 GLOB MFR UR ELPH: 0 %
ALPHA2 GLOB SERPL ELPH-MCNC: 0.83 G/DL (ref 0.4–1.2)
ALPHA2 GLOB SERPL ELPH-MCNC: 10.2 % (ref 7–13)
B-GLOBULIN MFR UR ELPH: 0 %
BETA GLOB ABNORMAL SERPL ELPH-MCNC: 0.56 G/DL (ref 0.4–0.8)
BETA1 GLOB SERPL ELPH-MCNC: 6.9 % (ref 5–13)
BETA2 GLOB SERPL ELPH-MCNC: 7.3 % (ref 2–8)
BETA2+GAMMA GLOB SERPL ELPH-MCNC: 0.59 G/DL (ref 0.2–0.5)
GAMMA GLOB ABNORMAL SERPL ELPH-MCNC: 1.83 G/DL (ref 0.5–1.6)
GAMMA GLOB MFR UR ELPH: 0 %
GAMMA GLOB SERPL ELPH-MCNC: 22.6 % (ref 12–22)
IGG/ALB SER: 0.98 {RATIO} (ref 1.1–1.8)
PROT PATTERN UR ELPH-IMP: NORMAL
PROT SERPL-MCNC: 8.1 G/DL (ref 6.4–8.2)
PROT UR-MCNC: 14 MG/DL

## 2019-02-08 DIAGNOSIS — E11.9 TYPE 2 DIABETES MELLITUS WITHOUT COMPLICATION, WITHOUT LONG-TERM CURRENT USE OF INSULIN (HCC): Primary | ICD-10-CM

## 2019-03-18 ENCOUNTER — TELEPHONE (OUTPATIENT)
Dept: INTERNAL MEDICINE CLINIC | Facility: CLINIC | Age: 69
End: 2019-03-18

## 2019-03-18 NOTE — TELEPHONE ENCOUNTER
Last OVS 1/29/19  Next OVS 4/30/19    Left v/m for pt re: wellness measures due, dm eye exam and A1C  Pt encouraged to call back if she will be going for dm eye exam and an order generated or it would be available at her next office visit (will addend appt   note)

## 2019-04-01 DIAGNOSIS — E78.5 DYSLIPIDEMIA: ICD-10-CM

## 2019-04-05 RX ORDER — ATORVASTATIN CALCIUM 40 MG/1
TABLET, FILM COATED ORAL
Qty: 90 TABLET | Refills: 0 | Status: SHIPPED | OUTPATIENT
Start: 2019-04-05 | End: 2020-04-01 | Stop reason: SDUPTHER

## 2019-04-14 DIAGNOSIS — E87.6 HYPOKALEMIA: ICD-10-CM

## 2019-04-14 DIAGNOSIS — I10 BENIGN ESSENTIAL HTN: ICD-10-CM

## 2019-04-16 RX ORDER — POTASSIUM CHLORIDE 750 MG/1
TABLET, EXTENDED RELEASE ORAL
Qty: 30 TABLET | Refills: 0 | OUTPATIENT
Start: 2019-04-16

## 2019-05-06 ENCOUNTER — TELEPHONE (OUTPATIENT)
Dept: INTERNAL MEDICINE CLINIC | Facility: CLINIC | Age: 69
End: 2019-05-06

## 2019-05-13 ENCOUNTER — TELEPHONE (OUTPATIENT)
Dept: INTERNAL MEDICINE CLINIC | Facility: CLINIC | Age: 69
End: 2019-05-13

## 2019-05-21 DIAGNOSIS — I10 BENIGN ESSENTIAL HTN: ICD-10-CM

## 2019-05-21 DIAGNOSIS — E87.6 HYPOKALEMIA: ICD-10-CM

## 2019-05-21 DIAGNOSIS — I10 ESSENTIAL HYPERTENSION, BENIGN: ICD-10-CM

## 2019-05-21 DIAGNOSIS — F41.8 DEPRESSION WITH ANXIETY: ICD-10-CM

## 2019-05-21 PROCEDURE — 4010F ACE/ARB THERAPY RXD/TAKEN: CPT | Performed by: INTERNAL MEDICINE

## 2019-05-21 RX ORDER — FLUOXETINE 20 MG/1
20 TABLET, FILM COATED ORAL DAILY
Qty: 90 TABLET | Refills: 1 | Status: SHIPPED | OUTPATIENT
Start: 2019-05-21 | End: 2019-07-12

## 2019-05-21 RX ORDER — AMLODIPINE BESYLATE 10 MG/1
10 TABLET ORAL DAILY
Qty: 90 TABLET | Refills: 1 | Status: SHIPPED | OUTPATIENT
Start: 2019-05-21 | End: 2019-12-27 | Stop reason: SDUPTHER

## 2019-05-21 RX ORDER — POTASSIUM CHLORIDE 750 MG/1
10 TABLET, EXTENDED RELEASE ORAL DAILY
Qty: 90 TABLET | Refills: 1 | Status: SHIPPED | OUTPATIENT
Start: 2019-05-21 | End: 2020-04-01 | Stop reason: SDUPTHER

## 2019-05-21 RX ORDER — HYDROCHLOROTHIAZIDE 12.5 MG/1
12.5 TABLET ORAL DAILY
Qty: 90 TABLET | Refills: 1 | Status: SHIPPED | OUTPATIENT
Start: 2019-05-21 | End: 2020-01-09 | Stop reason: SDUPTHER

## 2019-05-21 RX ORDER — LOSARTAN POTASSIUM 100 MG/1
100 TABLET ORAL DAILY
Qty: 90 TABLET | Refills: 1 | Status: SHIPPED | OUTPATIENT
Start: 2019-05-21 | End: 2019-12-27 | Stop reason: SDUPTHER

## 2019-06-03 ENCOUNTER — APPOINTMENT (OUTPATIENT)
Dept: LAB | Facility: CLINIC | Age: 69
End: 2019-06-03
Payer: COMMERCIAL

## 2019-06-03 DIAGNOSIS — I10 BENIGN ESSENTIAL HTN: ICD-10-CM

## 2019-06-03 DIAGNOSIS — E11.9 TYPE 2 DIABETES MELLITUS WITHOUT COMPLICATION, WITHOUT LONG-TERM CURRENT USE OF INSULIN (HCC): ICD-10-CM

## 2019-06-03 DIAGNOSIS — E87.6 HYPOKALEMIA: ICD-10-CM

## 2019-06-03 LAB
ANION GAP SERPL CALCULATED.3IONS-SCNC: 6 MMOL/L (ref 4–13)
BUN SERPL-MCNC: 21 MG/DL (ref 5–25)
CALCIUM SERPL-MCNC: 9.2 MG/DL (ref 8.3–10.1)
CHLORIDE SERPL-SCNC: 105 MMOL/L (ref 100–108)
CO2 SERPL-SCNC: 28 MMOL/L (ref 21–32)
CREAT SERPL-MCNC: 1.04 MG/DL (ref 0.6–1.3)
EST. AVERAGE GLUCOSE BLD GHB EST-MCNC: 143 MG/DL
GFR SERPL CREATININE-BSD FRML MDRD: 63 ML/MIN/1.73SQ M
GLUCOSE P FAST SERPL-MCNC: 93 MG/DL (ref 65–99)
HBA1C MFR BLD: 6.6 % (ref 4.2–6.3)
POTASSIUM SERPL-SCNC: 3.5 MMOL/L (ref 3.5–5.3)
SODIUM SERPL-SCNC: 139 MMOL/L (ref 136–145)

## 2019-06-03 PROCEDURE — 80048 BASIC METABOLIC PNL TOTAL CA: CPT

## 2019-06-03 PROCEDURE — 83036 HEMOGLOBIN GLYCOSYLATED A1C: CPT

## 2019-06-03 PROCEDURE — 36415 COLL VENOUS BLD VENIPUNCTURE: CPT

## 2019-06-04 ENCOUNTER — OFFICE VISIT (OUTPATIENT)
Dept: INTERNAL MEDICINE CLINIC | Facility: CLINIC | Age: 69
End: 2019-06-04
Payer: COMMERCIAL

## 2019-06-04 VITALS
SYSTOLIC BLOOD PRESSURE: 142 MMHG | BODY MASS INDEX: 43.92 KG/M2 | HEART RATE: 72 BPM | TEMPERATURE: 98.1 F | DIASTOLIC BLOOD PRESSURE: 78 MMHG | HEIGHT: 55 IN | WEIGHT: 189.8 LBS | OXYGEN SATURATION: 96 %

## 2019-06-04 DIAGNOSIS — Z91.09 ENVIRONMENTAL ALLERGIES: ICD-10-CM

## 2019-06-04 DIAGNOSIS — R26.81 UNSTEADY GAIT: ICD-10-CM

## 2019-06-04 DIAGNOSIS — I10 BENIGN ESSENTIAL HTN: ICD-10-CM

## 2019-06-04 DIAGNOSIS — T75.3XXA MOTION SICKNESS, INITIAL ENCOUNTER: ICD-10-CM

## 2019-06-04 DIAGNOSIS — R42 DIZZINESS: ICD-10-CM

## 2019-06-04 DIAGNOSIS — Z80.0 FAMILY HISTORY OF COLORECTAL CANCER: ICD-10-CM

## 2019-06-04 DIAGNOSIS — E11.9 TYPE 2 DIABETES MELLITUS WITHOUT COMPLICATION, WITHOUT LONG-TERM CURRENT USE OF INSULIN (HCC): Primary | ICD-10-CM

## 2019-06-04 DIAGNOSIS — J30.81 ALLERGIC RHINITIS DUE TO ANIMAL HAIR AND DANDER: ICD-10-CM

## 2019-06-04 DIAGNOSIS — R25.1 TREMOR: ICD-10-CM

## 2019-06-04 DIAGNOSIS — E87.6 HYPOKALEMIA: ICD-10-CM

## 2019-06-04 PROCEDURE — 99215 OFFICE O/P EST HI 40 MIN: CPT | Performed by: INTERNAL MEDICINE

## 2019-06-04 PROCEDURE — 3008F BODY MASS INDEX DOCD: CPT | Performed by: INTERNAL MEDICINE

## 2019-06-04 RX ORDER — ALBUTEROL SULFATE 90 UG/1
2 AEROSOL, METERED RESPIRATORY (INHALATION) AS NEEDED
Qty: 1 INHALER | Refills: 3 | Status: SHIPPED | OUTPATIENT
Start: 2019-06-04 | End: 2021-01-04 | Stop reason: SDUPTHER

## 2019-06-04 RX ORDER — CETIRIZINE HYDROCHLORIDE 10 MG/1
10 TABLET ORAL DAILY
Qty: 30 TABLET | Refills: 6 | Status: SHIPPED | OUTPATIENT
Start: 2019-06-04 | End: 2019-07-12 | Stop reason: SDUPTHER

## 2019-06-04 RX ORDER — MECLIZINE HYDROCHLORIDE 25 MG/1
25 TABLET ORAL EVERY 12 HOURS PRN
Qty: 30 TABLET | Refills: 1 | Status: SHIPPED | OUTPATIENT
Start: 2019-06-04 | End: 2020-03-18

## 2019-07-02 ENCOUNTER — EVALUATION (OUTPATIENT)
Dept: PHYSICAL THERAPY | Facility: CLINIC | Age: 69
End: 2019-07-02
Payer: COMMERCIAL

## 2019-07-02 DIAGNOSIS — R26.81 UNSTEADY GAIT: ICD-10-CM

## 2019-07-02 DIAGNOSIS — H81.90 DISORDER OF VESTIBULAR FUNCTION, UNSPECIFIED LATERALITY: Primary | ICD-10-CM

## 2019-07-02 DIAGNOSIS — T75.3XXA MOTION SICKNESS, INITIAL ENCOUNTER: ICD-10-CM

## 2019-07-02 PROCEDURE — 97163 PT EVAL HIGH COMPLEX 45 MIN: CPT | Performed by: PHYSICAL THERAPIST

## 2019-07-02 PROCEDURE — 97530 THERAPEUTIC ACTIVITIES: CPT | Performed by: PHYSICAL THERAPIST

## 2019-07-02 NOTE — PROGRESS NOTES
PT Evaluation     Today's date: 2019  Patient name: Chemo Huerta  : 1950  MRN: [id number]  Referring provider: Krystal Carrera MD  Dx:   Encounter Diagnosis     ICD-10-CM    1  Disorder of vestibular function, unspecified laterality H81 90    2  Unsteady gait R26 81 Ambulatory referral to Physical Therapy   3  Motion sickness, initial encounter T75  3XXA Ambulatory referral to Physical Therapy                  Assessment  Assessment details: Pt is a 72 yo female presenting with diagnoses of decreased balance and motion sickness  She presents with markedly decreased C/S ROM, hypertonic C/S musculature, resting neck tremor, visuovestibular dysfunction, decreased standing balance, and the above functional limitations  She is a good candidate for outpatient physical therapy to address the above impairments and optimize safe functional mobility  She may also benefit from neurology consult due to tremor and hypertonic C/S musculature  Functional limitations: slow with functional transfers, gets dizzy when walking in dynamic environment, significant motion sickness, dizziness with visual tracking, dizziness with stairs, limited walking toleranceUnderstanding of Dx/Px/POC: fair   Prognosis: fair    Goals  STG - 2 weeks  1  Independent with HEP  2  Balance in tandem stance for 10 seconds to indicate improved functional balance  LTG - 4 weeks  1  Increase score on Weiss Balance Scale by at least 5 points to indicate improved functional balance  2  Increase AROM C/S all planes by 25% or more to assess effect on dizziness and headaches  3  Tolerate all visuovestibular tasks without onset of dizziness to improve functional mobility  Plan  Plan details: Granddaughter present throughout evaluation  Reviewed physical exam findings and plan of care  All questions answered to patient's satisfaction      Patient would benefit from: skilled physical therapy  Planned modality interventions: low level laser therapy, TENS and thermotherapy: hydrocollator packs  Planned therapy interventions: manual therapy, neuromuscular re-education, patient education, postural training, therapeutic activities, therapeutic exercise and home exercise program  Frequency: 2x week  Duration in weeks: 4  Treatment plan discussed with: patient and family        Subjective Evaluation    History of Present Illness  Mechanism of injury: Pt and granddaughter report over the last couple years she's been having decreased balance and episodes of dizziness  They seem to be associated with either moving too quickly or when she's in highly visually stimulating environments  She has also gotten more motion sick recently  She's had 3-4 episodes of vertigo in the last year, resolving without intervention  She had her eye exam last summer with no concerns  She arrives today with referral to outpatient PT  PMH significant for HTN, DM II, R knee OA, depression, anxiety  Pain  No pain reported  Progression: worsening    Social Support  Lives with: spouse    Employment status: not working    Diagnostic Tests  No diagnostic tests performed  Treatments  No previous or current treatments  Patient Goals  Patient goals for therapy: improved balance  Patient goal: decrease balance        Objective      Dysequilibrium: Yes  Lightheadedness: No  Vertigo: Yes  Rocking or Swaying: No         Oscillopsia: No  Diplopia: No  Motion sickness: Yes  Floating, Swimming, Disconnected: No    Exacerbation Factors:  Bending over: Yes  Turning Head: No  Rolling in bed: Yes  Walking: No  Looking up: No  Supine to/from sitting: No  Optokinetic movement: Yes  Walking in busy environment: Yes    Duration of Symptoms: few minutes     Concurrent Complaints:  Tinnitus:Yes  Aural Fullness: Yes  Known hearing loss:No  Nausea, Vomiting: Yes  Altered Vision: No  Poor Concentration: Yes  Memory Loss: No  Peripheral Neuropathy:No  Cervical Pain: No   Headache: Yes      PHYSICAL FINDINGS:  Oculomotor ROM : WNL  Resting nystagmus: No  Gaze holding nystagmus No   Smooth pursuit Abnormal (c/o dizziness)    Vertical Saccades:Normal  Horizontal Saccades:Normal  Convergence: Abnormal (c/o dizziness)    Cover/Uncover/Crosscover Test: Abnormal (lateral deviation of both eyes when covered)    Head thrust (room light): Normal      MCTSIB  >30 sec eyes open firm surface   >30 sec eyes closed firm surface  >30 sec eyes open foam surface  10 sec eyes closed foam surface        Positional testing: Right Left   Neil Munson pike negative negative   Roll test: negative negative       Cervical ROM:  Flexion: 75% restricted  Extension: 75% restricted  Right rotation: 50% restricted  Left rotation: 50% restricted  Right lateral flexion:75% restricted  Left lateral flexion:75% restricted    Significantly increased tone in bilateral upper traps, cervical paraspinals, lev scap    Joint Position Error Test = normal bilaterally    Flowsheet Rows      Most Recent Value   Weiss Balance Scale   1  Sitting to Standing  4   2  Standing Unsupported  4   3  Sitting with Back Unsupported but Feet Supported on Floor or on a Stool  4   4  Standing to Sitting  4   5  Transfers  4   6  Standing Unsupported with Eyes Closed  3   7  Standing Unsupported with Feet Together  3   8  Reach Forward with Outstretched Arm While Standing  4   9   Object from Floor from a Standing Position  4   10  Turning to Look Behind Over Left and Right Shoulders While Standing  2   11  Turn 360 Degrees  4   12  Place Alternate Foot on Step or Stool While Standing Unsupported  4   13  Standing Unsupported One Foot in [facility]  3   14   Standing on One Leg  2   Weiss Balance Score  49            Flowsheet Rows      Most Recent Value   PT/OT G-Codes   Current Score  74   Projected Score  76                  Precautions: fall risk, dizziness    Daily Treatment Diary       Manuals 7/2            STM C/S parasp, upper trap             C/S traction + PROM Exercise Diary              C/S AROM all planes             Scap Sq             Posture tband                                       Alt Cone Tap             Step Up F/L             SLR x3 no UE             Tandem Stance             Ball toss foam             Ball bounce/toss                          VOR             Smooth Pursuit             Convergence                                       [employer]                                                    DIA 49/56            Modalities

## 2019-07-12 ENCOUNTER — TELEPHONE (OUTPATIENT)
Dept: INTERNAL MEDICINE CLINIC | Facility: CLINIC | Age: 69
End: 2019-07-12

## 2019-07-12 ENCOUNTER — TELEPHONE (OUTPATIENT)
Dept: NEUROLOGY | Facility: CLINIC | Age: 69
End: 2019-07-12

## 2019-07-12 ENCOUNTER — OFFICE VISIT (OUTPATIENT)
Dept: INTERNAL MEDICINE CLINIC | Facility: CLINIC | Age: 69
End: 2019-07-12
Payer: COMMERCIAL

## 2019-07-12 ENCOUNTER — TELEPHONE (OUTPATIENT)
Dept: HEMATOLOGY ONCOLOGY | Facility: CLINIC | Age: 69
End: 2019-07-12

## 2019-07-12 VITALS
TEMPERATURE: 98.3 F | HEART RATE: 78 BPM | BODY MASS INDEX: 35.26 KG/M2 | HEIGHT: 62 IN | DIASTOLIC BLOOD PRESSURE: 72 MMHG | WEIGHT: 191.6 LBS | OXYGEN SATURATION: 96 % | SYSTOLIC BLOOD PRESSURE: 126 MMHG

## 2019-07-12 DIAGNOSIS — R42 DIZZINESS: ICD-10-CM

## 2019-07-12 DIAGNOSIS — Z23 NEED FOR PNEUMOCOCCAL VACCINATION: ICD-10-CM

## 2019-07-12 DIAGNOSIS — F34.1 DYSTHYMIA: ICD-10-CM

## 2019-07-12 DIAGNOSIS — T75.3XXD MOTION SICKNESS, SUBSEQUENT ENCOUNTER: ICD-10-CM

## 2019-07-12 DIAGNOSIS — R26.81 UNSTEADY GAIT: ICD-10-CM

## 2019-07-12 DIAGNOSIS — Z91.09 ENVIRONMENTAL ALLERGIES: ICD-10-CM

## 2019-07-12 DIAGNOSIS — J30.81 ALLERGIC RHINITIS DUE TO ANIMAL HAIR AND DANDER: ICD-10-CM

## 2019-07-12 DIAGNOSIS — D89.2 HYPERGAMMAGLOBULINEMIA: Primary | ICD-10-CM

## 2019-07-12 DIAGNOSIS — E87.6 HYPOKALEMIA: ICD-10-CM

## 2019-07-12 DIAGNOSIS — F41.9 ANXIETY: ICD-10-CM

## 2019-07-12 DIAGNOSIS — I10 BENIGN ESSENTIAL HTN: ICD-10-CM

## 2019-07-12 DIAGNOSIS — R25.1 TREMOR: ICD-10-CM

## 2019-07-12 DIAGNOSIS — E11.9 TYPE 2 DIABETES MELLITUS WITHOUT COMPLICATION, WITHOUT LONG-TERM CURRENT USE OF INSULIN (HCC): Primary | ICD-10-CM

## 2019-07-12 PROBLEM — R35.0 URINARY FREQUENCY: Status: RESOLVED | Noted: 2018-07-13 | Resolved: 2019-07-12

## 2019-07-12 PROCEDURE — 1160F RVW MEDS BY RX/DR IN RCRD: CPT | Performed by: INTERNAL MEDICINE

## 2019-07-12 PROCEDURE — 3078F DIAST BP <80 MM HG: CPT | Performed by: INTERNAL MEDICINE

## 2019-07-12 PROCEDURE — 90732 PPSV23 VACC 2 YRS+ SUBQ/IM: CPT

## 2019-07-12 PROCEDURE — 3008F BODY MASS INDEX DOCD: CPT | Performed by: INTERNAL MEDICINE

## 2019-07-12 PROCEDURE — 90471 IMMUNIZATION ADMIN: CPT

## 2019-07-12 PROCEDURE — 99214 OFFICE O/P EST MOD 30 MIN: CPT | Performed by: INTERNAL MEDICINE

## 2019-07-12 PROCEDURE — 3074F SYST BP LT 130 MM HG: CPT | Performed by: INTERNAL MEDICINE

## 2019-07-12 RX ORDER — SERTRALINE HYDROCHLORIDE 25 MG/1
25 TABLET, FILM COATED ORAL DAILY
Qty: 30 TABLET | Refills: 5 | Status: SHIPPED | OUTPATIENT
Start: 2019-07-12 | End: 2019-09-10 | Stop reason: SDUPTHER

## 2019-07-12 RX ORDER — CETIRIZINE HYDROCHLORIDE 10 MG/1
10 TABLET ORAL DAILY
Qty: 90 TABLET | Refills: 1 | Status: SHIPPED | OUTPATIENT
Start: 2019-07-12 | End: 2020-04-01 | Stop reason: SDUPTHER

## 2019-07-12 NOTE — TELEPHONE ENCOUNTER
Spoke to patient and informed her that her labs need to be completed prior to her appt on 7/15/19  Patient stated she will go to [address] [place] [address] lab and have them done before her appt

## 2019-07-12 NOTE — TELEPHONE ENCOUNTER
Chester Schuler  I am sorry I do not know which she went to  Could you check with her grand-daughter? She usually helps with appointments    Thanks Kahlil Wilson

## 2019-07-12 NOTE — TELEPHONE ENCOUNTER
Called and spoke with patient she stated she was getting lab work done today for her appointment on Monday

## 2019-07-12 NOTE — PROGRESS NOTES
Assessment/Plan:    Type 2 diabetes mellitus without complication, without long-term current use of insulin ([facility] [address] )     continue with diet management  Recheck A1c  Allergic rhinitis  Continue with Flonase and Zyrtec  Benign essential HTN  Well controlled  Continue current regimen  Depression  Improved however may be having increased anxiety  Plan will be to wean off Prozac by taking 20 mg every other day for 2 weeks and will start sertraline 25 mg daily  Hypokalemia  Continue potassium replacement therapy  Tremor  Requesting referral to Neurology  Dizziness  Continue with as needed meclizine and vestibular rehab  Unsteady gait  Continue with PT  Motion sickness  Continue with as needed meclizine  Diagnoses and all orders for this visit:    Type 2 diabetes mellitus without complication, without long-term current use of insulin ([facility])    Environmental allergies  -     cetirizine (ZyrTEC) 10 mg tablet; Take 1 tablet (10 mg total) by mouth daily    Allergic rhinitis due to animal hair and dander  -     cetirizine (ZyrTEC) 10 mg tablet; Take 1 tablet (10 mg total) by mouth daily    Benign essential HTN    Tremor  -     Ambulatory referral to Neurology; Future    Dysthymia  -     sertraline (ZOLOFT) 25 mg tablet; Take 1 tablet (25 mg total) by mouth daily    Hypokalemia    Dizziness    Unsteady gait  -     Ambulatory referral to Neurology; Future    Motion sickness, subsequent encounter    Anxiety  -     sertraline (ZOLOFT) 25 mg tablet; Take 1 tablet (25 mg total) by mouth daily    Other orders  -     Cancel: Ambulatory referral to Ophthalmology; Future          Subjective:      Patient ID: Sabrina Thao is a 71 y o  female  71year old female is seen today for follow up  She was assessed by PT for vestibular rehab  She was also noticed to have a tremor and increased tone in her neck, recommendations are for neurology evaluation  No labs to review today   She has been taking meclizine as needed for dizziness, which has helped  Her daughter has noticed that she shuffles her feet when walking  Diabetes   She presents for her follow-up diabetic visit  She has type 2 diabetes mellitus  Her disease course has been stable  Hypoglycemia symptoms include nervousness/anxiousness  Pertinent negatives for hypoglycemia include no confusion, dizziness or headaches  There are no diabetic associated symptoms  Pertinent negatives for diabetes include no chest pain, no fatigue and no weakness  There are no hypoglycemic complications  Symptoms are stable  There are no diabetic complications  Pertinent negatives for diabetic complications include no impotence  Risk factors for coronary artery disease include diabetes mellitus, obesity and dyslipidemia  She is following a generally healthy diet  She rarely participates in exercise  An ACE inhibitor/angiotensin II receptor blocker is being taken  She does not see a podiatrist Eye exam is current  Anxiety   Presents for follow-up visit  Symptoms include depressed mood, excessive worry and nervous/anxious behavior  Patient reports no chest pain, compulsions, confusion, decreased concentration, dizziness, dry mouth, feeling of choking, hyperventilation, impotence, insomnia, irritability, malaise, muscle tension, nausea, obsessions, palpitations, panic, restlessness, shortness of breath or suicidal ideas  The severity of symptoms is mild  The patient sleeps 8 hours per night  Nighttime awakenings: one to two  Compliance with medications is % (prozac)  The following portions of the patient's history were reviewed and updated as appropriate: allergies, current medications, past family history, past medical history, past social history, past surgical history and problem list     Review of Systems   Constitutional: Negative for activity change, appetite change, chills, diaphoresis, fatigue, fever and irritability     HENT: Negative for congestion, postnasal drip, rhinorrhea, sinus pressure, sinus pain, sneezing and sore throat  Eyes: Negative for visual disturbance  Respiratory: Negative for apnea, cough, choking, chest tightness, shortness of breath and wheezing  Cardiovascular: Negative for chest pain, palpitations and leg swelling  Gastrointestinal: Negative for abdominal distention, abdominal pain, anal bleeding, blood in stool, constipation, diarrhea, nausea and vomiting  Endocrine: Negative for cold intolerance and heat intolerance  Genitourinary: Negative for difficulty urinating, dysuria, hematuria and impotence  Musculoskeletal: Negative  Skin: Negative  Neurological: Negative for dizziness, weakness, light-headedness, numbness and headaches  Hematological: Negative for adenopathy  Psychiatric/Behavioral: Negative for agitation, confusion, decreased concentration, sleep disturbance and suicidal ideas  The patient is nervous/anxious  The patient does not have insomnia  All other systems reviewed and are negative          Past Medical History:   Diagnosis Date    Anxiety     Arthritis     B-complex deficiency     Depression     Diabetes mellitus ([facility] [address] )     Glaucoma     Gout     Hypercalcemia     Hyperlipidemia     Last assessed: 8/5/15    Irregular heart beat     Memory loss     Palpitations     Suicidal ideation          Current Outpatient Medications:     albuterol (PROAIR HFA) 90 mcg/act inhaler, Inhale 2 puffs as needed for wheezing or shortness of breath, Disp: 1 Inhaler, Rfl: 3    amLODIPine (NORVASC) 10 mg tablet, Take 1 tablet (10 mg total) by mouth daily, Disp: 90 tablet, Rfl: 1    aspirin 81 MG tablet, Take 1 tablet by mouth daily, Disp: , Rfl:     atorvastatin (LIPITOR) 40 mg tablet, TAKE 1 TABLET(40 MG) BY MOUTH DAILY, Disp: 90 tablet, Rfl: 0    cetirizine (ZyrTEC) 10 mg tablet, Take 1 tablet (10 mg total) by mouth daily, Disp: 90 tablet, Rfl: 1    Cholecalciferol (VITAMIN D3) 2000 units capsule, Take 1 capsule by mouth daily, Disp: , Rfl:     fluticasone (FLONASE) 50 mcg/act nasal spray, 1 spray into each nostril as needed  , Disp: , Rfl:     hydrochlorothiazide (HYDRODIURIL) 12 5 mg tablet, Take 1 tablet (12 5 mg total) by mouth daily, Disp: 90 tablet, Rfl: 1    losartan (COZAAR) 100 MG tablet, Take 1 tablet (100 mg total) by mouth daily, Disp: 90 tablet, Rfl: 1    meclizine (ANTIVERT) 25 mg tablet, Take 1 tablet (25 mg total) by mouth every 12 (twelve) hours as needed for dizziness, Disp: 30 tablet, Rfl: 1    potassium chloride (K-DUR,KLOR-CON) 10 mEq tablet, Take 1 tablet (10 mEq total) by mouth daily, Disp: 90 tablet, Rfl: 1    sertraline (ZOLOFT) 25 mg tablet, Take 1 tablet (25 mg total) by mouth daily, Disp: 30 tablet, Rfl: 5    Allergies   Allergen Reactions    Lisinopril Cough    Other      Seasonal allergies and animal fur       Social History   Past Surgical History:   Procedure Laterality Date    TUBAL LIGATION      VAGINAL DELIVERY      x2     Family History   Problem Relation Age of Onset    Hypertension Mother         Benign Essential    Cancer Mother     Rectal cancer Mother     Colon cancer Mother     Hypertension Father         Benign Essential    Cancer Father     Diabetes Sister     Cirrhosis Daughter         Hepatic    Hepatitis Daughter         Type C Viral    Pancreatic cancer Cousin        Objective:  /72 (BP Location: Left arm, Patient Position: Sitting, Cuff Size: Adult)   Pulse 78   Temp 98 3 °F (36 8 °C) (Oral)   Ht 5' 2" (1 575 m)   Wt 86 9 kg (191 lb 9 6 oz)   SpO2 96% Comment: room air  BMI 35 04 kg/m²     Recent Results (from the past 1344 hour(s))   Basic metabolic panel    Collection Time: 06/03/19 10:39 AM   Result Value Ref Range    Sodium 139 136 - 145 mmol/L    Potassium 3 5 3 5 - 5 3 mmol/L    Chloride 105 100 - 108 mmol/L    CO2 28 21 - 32 mmol/L    ANION GAP 6 4 - 13 mmol/L    BUN 21 5 - 25 mg/dL    Creatinine 1 04 0 60 - 1 30 mg/dL    Glucose, Fasting 93 65 - 99 mg/dL    Calcium 9 2 8 3 - 10 1 mg/dL    eGFR 63 ml/min/1 73sq m   Hemoglobin A1C    Collection Time: 06/03/19 10:39 AM   Result Value Ref Range    Hemoglobin A1C 6 6 (H) 4 2 - 6 3 %     mg/dl            Physical Exam   Constitutional: She is oriented to person, place, and time  She appears well-developed and well-nourished  No distress  HENT:   Head: Normocephalic and atraumatic  Eyes: Pupils are equal, round, and reactive to light  Conjunctivae and EOM are normal  Right eye exhibits no discharge  Left eye exhibits no discharge  Neck: Normal range of motion  Neck supple  No JVD present  No thyromegaly present  Cardiovascular: Normal rate, regular rhythm, normal heart sounds and intact distal pulses  Exam reveals no gallop and no friction rub  No murmur heard  Pulmonary/Chest: Effort normal and breath sounds normal  No respiratory distress  She has no wheezes  She has no rales  She exhibits no tenderness  Abdominal: Soft  She exhibits no distension  There is no tenderness  Musculoskeletal: Normal range of motion  She exhibits no edema, tenderness or deformity  Lymphadenopathy:     She has no cervical adenopathy  Neurological: She is alert and oriented to person, place, and time  No cranial nerve deficit  Coordination normal    Skin: Skin is warm and dry  No rash noted  She is not diaphoretic  No erythema  No pallor  Psychiatric: She has a normal mood and affect  Her behavior is normal  Judgment and thought content normal    Nursing note and vitals reviewed

## 2019-07-12 NOTE — ASSESSMENT & PLAN NOTE
Improved however may be having increased anxiety  Plan will be to wean off Prozac by taking 20 mg every other day for 2 weeks and will start sertraline 25 mg daily

## 2019-07-12 NOTE — TELEPHONE ENCOUNTER
Dear Ric Rivera,    Pt was seen today and states that she is up to date with her DM eye exam but she cannot recall what ophthalmologist she went to  She states she went to a doctor that you reccommended at her May visit of last year  By any chance, might you remember what doctor you recommended? She says it was a doctor that had his own office by himself --not a part of a larger office like [employer]  Thanks in advance!

## 2019-07-12 NOTE — PATIENT INSTRUCTIONS
Problem List Items Addressed This Visit        Endocrine    Type 2 diabetes mellitus without complication, without long-term current use of insulin ([facility] [address] ) - Primary        continue with diet management  Recheck A1c  Respiratory    Allergic rhinitis     Continue with Flonase and Zyrtec  Cardiovascular and Mediastinum    Benign essential HTN     Well controlled  Continue current regimen  Other    Depression     Improved however may be having increased anxiety  Plan will be to wean off Prozac by taking 20 mg every other day for 2 weeks and will start sertraline 25 mg daily  Hypokalemia     Continue potassium replacement therapy  Tremor     Requesting referral to Neurology  Dizziness     Continue with as needed meclizine and vestibular rehab  Environmental allergies    Unsteady gait     Continue with PT  Motion sickness     Continue with as needed meclizine

## 2019-07-13 ENCOUNTER — LAB (OUTPATIENT)
Dept: LAB | Facility: HOSPITAL | Age: 69
End: 2019-07-13
Attending: INTERNAL MEDICINE
Payer: COMMERCIAL

## 2019-07-13 DIAGNOSIS — D89.2 HYPERGAMMAGLOBULINEMIA: ICD-10-CM

## 2019-07-13 LAB
ALBUMIN SERPL BCP-MCNC: 3.8 G/DL (ref 3.5–5)
ALP SERPL-CCNC: 84 U/L (ref 46–116)
ALT SERPL W P-5'-P-CCNC: 20 U/L (ref 12–78)
ANION GAP SERPL CALCULATED.3IONS-SCNC: 11 MMOL/L (ref 4–13)
AST SERPL W P-5'-P-CCNC: 21 U/L (ref 5–45)
BASOPHILS # BLD AUTO: 0.08 THOUSANDS/ΜL (ref 0–0.1)
BASOPHILS NFR BLD AUTO: 1 % (ref 0–1)
BILIRUB SERPL-MCNC: 0.64 MG/DL (ref 0.2–1)
BUN SERPL-MCNC: 24 MG/DL (ref 5–25)
CALCIUM SERPL-MCNC: 10 MG/DL (ref 8.3–10.1)
CHLORIDE SERPL-SCNC: 101 MMOL/L (ref 100–108)
CO2 SERPL-SCNC: 28 MMOL/L (ref 21–32)
CREAT SERPL-MCNC: 1.1 MG/DL (ref 0.6–1.3)
EOSINOPHIL # BLD AUTO: 0.46 THOUSAND/ΜL (ref 0–0.61)
EOSINOPHIL NFR BLD AUTO: 6 % (ref 0–6)
ERYTHROCYTE [DISTWIDTH] IN BLOOD BY AUTOMATED COUNT: 13.4 % (ref 11.6–15.1)
GFR SERPL CREATININE-BSD FRML MDRD: 59 ML/MIN/1.73SQ M
GLUCOSE P FAST SERPL-MCNC: 95 MG/DL (ref 65–99)
HCT VFR BLD AUTO: 39.4 % (ref 34.8–46.1)
HGB BLD-MCNC: 13 G/DL (ref 11.5–15.4)
IGA SERPL-MCNC: 448 MG/DL (ref 70–400)
IGG SERPL-MCNC: 1770 MG/DL (ref 700–1600)
IGM SERPL-MCNC: 74 MG/DL (ref 40–230)
IMM GRANULOCYTES # BLD AUTO: 0.03 THOUSAND/UL (ref 0–0.2)
IMM GRANULOCYTES NFR BLD AUTO: 0 % (ref 0–2)
LYMPHOCYTES # BLD AUTO: 2.78 THOUSANDS/ΜL (ref 0.6–4.47)
LYMPHOCYTES NFR BLD AUTO: 39 % (ref 14–44)
MCH RBC QN AUTO: 30.2 PG (ref 26.8–34.3)
MCHC RBC AUTO-ENTMCNC: 33 G/DL (ref 31.4–37.4)
MCV RBC AUTO: 92 FL (ref 82–98)
MONOCYTES # BLD AUTO: 0.69 THOUSAND/ΜL (ref 0.17–1.22)
MONOCYTES NFR BLD AUTO: 10 % (ref 4–12)
NEUTROPHILS # BLD AUTO: 3.18 THOUSANDS/ΜL (ref 1.85–7.62)
NEUTS SEG NFR BLD AUTO: 44 % (ref 43–75)
NRBC BLD AUTO-RTO: 0 /100 WBCS
PLATELET # BLD AUTO: 349 THOUSANDS/UL (ref 149–390)
PMV BLD AUTO: 9 FL (ref 8.9–12.7)
POTASSIUM SERPL-SCNC: 4 MMOL/L (ref 3.5–5.3)
PROT SERPL-MCNC: 8.7 G/DL (ref 6.4–8.2)
RBC # BLD AUTO: 4.3 MILLION/UL (ref 3.81–5.12)
SODIUM SERPL-SCNC: 140 MMOL/L (ref 136–145)
WBC # BLD AUTO: 7.22 THOUSAND/UL (ref 4.31–10.16)

## 2019-07-13 PROCEDURE — 36415 COLL VENOUS BLD VENIPUNCTURE: CPT

## 2019-07-13 PROCEDURE — 80053 COMPREHEN METABOLIC PANEL: CPT

## 2019-07-13 PROCEDURE — 82784 ASSAY IGA/IGD/IGG/IGM EACH: CPT

## 2019-07-13 PROCEDURE — 85025 COMPLETE CBC W/AUTO DIFF WBC: CPT

## 2019-07-15 ENCOUNTER — OFFICE VISIT (OUTPATIENT)
Dept: HEMATOLOGY ONCOLOGY | Facility: CLINIC | Age: 69
End: 2019-07-15
Payer: COMMERCIAL

## 2019-07-15 VITALS
HEIGHT: 62 IN | BODY MASS INDEX: 35.15 KG/M2 | RESPIRATION RATE: 16 BRPM | TEMPERATURE: 98.6 F | SYSTOLIC BLOOD PRESSURE: 118 MMHG | DIASTOLIC BLOOD PRESSURE: 80 MMHG | WEIGHT: 191 LBS | HEART RATE: 96 BPM

## 2019-07-15 DIAGNOSIS — D89.2 HYPERGAMMAGLOBULINEMIA: Primary | ICD-10-CM

## 2019-07-15 PROCEDURE — 99213 OFFICE O/P EST LOW 20 MIN: CPT | Performed by: PHYSICIAN ASSISTANT

## 2019-07-15 NOTE — TELEPHONE ENCOUNTER
Thank you Adriel Long  It kt chaudhry granddaughter who asked me to ask you  She couldn't remember either  Thank you anyway  Unfortunately, without knowing who the pt went to, we cannot update her DM EYE EXAM HM requirement and it's going to show that she is due  I gave her an order anyway since it has been over a year that she was seen so most likely she is due for a repeat eye exam, unless she was neg for retinopathy

## 2019-07-15 NOTE — PROGRESS NOTES
Hematology/Oncology Outpatient Follow-up  Bibi Guidry 71 y o  female 1950 [id number]    Date:  7/15/2019      Assessment and Plan:    1  Hypergammaglobulinemia  66-year-old female with history of hypergammaglobulinemia  Serum immunoglobulins are actually decreased  She does not have any complaints  We will follow up in 1 year  If improved or stable patient will be advised to follow up brayden Oconnor - CBC and differential  - Comprehensive metabolic panel  - IgG, IgA, IgM; Future    HPI:  66-year-old female presents for follow-up regarding hypergammaglobulinemia  She was seen in consultation our office in August 2018  This was due to elevated total protein phone and CMP  SPEP was assessed in August 2018 which was negative for monoclonal bands  ROS: Review of Systems   Constitutional: Negative for appetite change, chills, fatigue, fever and unexpected weight change  Respiratory: Negative for cough and shortness of breath  Cardiovascular: Negative for chest pain, palpitations and leg swelling  Gastrointestinal: Negative for abdominal pain, blood in stool, constipation, diarrhea, nausea and vomiting  Genitourinary: Negative for difficulty urinating, dysuria and hematuria  Musculoskeletal: Negative for arthralgias  Tightness of her neck for which she is seeking PT   Skin: Negative  Neurological: Negative for dizziness, weakness, light-headedness, numbness and headaches  Hematological: Negative  Psychiatric/Behavioral: Negative          Past Medical History:   Diagnosis Date    Anxiety     Arthritis     B-complex deficiency     Depression     Diabetes mellitus ([address] )     Glaucoma     Gout     Hypercalcemia     Hyperlipidemia     Last assessed: 8/5/15    Irregular heart beat     Memory loss     Palpitations     Suicidal ideation        Past Surgical History:   Procedure Laterality Date    TUBAL LIGATION      VAGINAL DELIVERY      x2       Social History     Socioeconomic History    Marital status: /Civil Union     Spouse name: None    Number of children: None    Years of education: None    Highest education level: None   Occupational History    None   Social Needs    Financial resource strain: None    Food insecurity:     Worry: None     Inability: None    Transportation needs:     Medical: None     Non-medical: None   Tobacco Use    Smoking status: Never Smoker    Smokeless tobacco: Never Used   Substance and Sexual Activity    Alcohol use: No    Drug use: No    Sexual activity: None   Lifestyle    Physical activity:     Days per week: None     Minutes per session: None    Stress: None   Relationships    Social connections:     Talks on phone: None     Gets together: None     Attends Gnosticism service: None     Active member of club or organization: None     Attends meetings of clubs or organizations: None     Relationship status: None    Intimate partner violence:     Fear of current or ex partner: None     Emotionally abused: None     Physically abused: None     Forced sexual activity: None   Other Topics Concern    None   Social History Narrative    Advance directive in chart       Family History   Problem Relation Age of Onset    Hypertension Mother         Benign Essential    Cancer Mother     Rectal cancer Mother     Colon cancer Mother     Hypertension Father         Benign Essential    Cancer Father     Diabetes Sister     Cirrhosis Daughter         Hepatic    Hepatitis Daughter         Type C Viral    Pancreatic cancer Cousin        Allergies   Allergen Reactions    Lisinopril Cough    Other      Seasonal allergies and animal fur         Current Outpatient Medications:     albuterol (PROAIR HFA) 90 mcg/act inhaler, Inhale 2 puffs as needed for wheezing or shortness of breath, Disp: 1 Inhaler, Rfl: 3    amLODIPine (NORVASC) 10 mg tablet, Take 1 tablet (10 mg total) by mouth daily, Disp: 90 tablet, Rfl: 1    aspirin 81 MG tablet, Take 1 tablet by mouth daily, Disp: , Rfl:     atorvastatin (LIPITOR) 40 mg tablet, TAKE 1 TABLET(40 MG) BY MOUTH DAILY, Disp: 90 tablet, Rfl: 0    cetirizine (ZyrTEC) 10 mg tablet, Take 1 tablet (10 mg total) by mouth daily, Disp: 90 tablet, Rfl: 1    Cholecalciferol (VITAMIN D3) 2000 units capsule, Take 1 capsule by mouth daily, Disp: , Rfl:     fluticasone (FLONASE) 50 mcg/act nasal spray, 1 spray into each nostril as needed  , Disp: , Rfl:     hydrochlorothiazide (HYDRODIURIL) 12 5 mg tablet, Take 1 tablet (12 5 mg total) by mouth daily, Disp: 90 tablet, Rfl: 1    losartan (COZAAR) 100 MG tablet, Take 1 tablet (100 mg total) by mouth daily, Disp: 90 tablet, Rfl: 1    meclizine (ANTIVERT) 25 mg tablet, Take 1 tablet (25 mg total) by mouth every 12 (twelve) hours as needed for dizziness, Disp: 30 tablet, Rfl: 1    potassium chloride (K-DUR,KLOR-CON) 10 mEq tablet, Take 1 tablet (10 mEq total) by mouth daily, Disp: 90 tablet, Rfl: 1    sertraline (ZOLOFT) 25 mg tablet, Take 1 tablet (25 mg total) by mouth daily, Disp: 30 tablet, Rfl: 5      Physical Exam:  /80 (BP Location: Left arm)   Pulse 96   Temp 98 6 °F (37 °C) (Tympanic)   Resp 16   Ht 5' 2" (1 575 m)   Wt 86 6 kg (191 lb)   BMI 34 93 kg/m²     Physical Exam   Constitutional: She is oriented to person, place, and time  She appears well-developed and well-nourished  No distress  HENT:   Head: Normocephalic and atraumatic  Eyes: Conjunctivae are normal  No scleral icterus  Neck: Normal range of motion  Neck supple  Cardiovascular: Normal rate, regular rhythm and normal heart sounds  No murmur heard  Pulmonary/Chest: Effort normal and breath sounds normal  No respiratory distress  Abdominal: Soft  There is no tenderness  Musculoskeletal: Normal range of motion  She exhibits no edema or tenderness  Lymphadenopathy:     She has no cervical adenopathy  Neurological: She is alert and oriented to person, place, and time   No cranial nerve deficit  Skin: Skin is warm and dry  Psychiatric: She has a normal mood and affect  Labs:  Lab Results   Component Value Date    WBC 7 22 07/13/2019    HGB 13 0 07/13/2019    HCT 39 4 07/13/2019    MCV 92 07/13/2019     07/13/2019     Lab Results   Component Value Date     05/30/2015    K 4 0 07/13/2019     07/13/2019    CO2 28 07/13/2019    ANIONGAP 6 05/30/2015    BUN 24 07/13/2019    CREATININE 1 10 07/13/2019    GLUCOSE 105 05/30/2015    GLUF 95 07/13/2019    CALCIUM 10 0 07/13/2019    AST 21 07/13/2019    ALT 20 07/13/2019    ALKPHOS 84 07/13/2019    PROT 8 5 (H) 05/30/2015    BILITOT 0 56 05/30/2015    EGFR 59 07/13/2019       Patient voiced understanding and agreement in the above discussion  Aware to contact our office with questions/symptoms in the interim

## 2019-07-16 ENCOUNTER — OFFICE VISIT (OUTPATIENT)
Dept: PHYSICAL THERAPY | Facility: CLINIC | Age: 69
End: 2019-07-16
Payer: COMMERCIAL

## 2019-07-16 DIAGNOSIS — H81.90 DISORDER OF VESTIBULAR FUNCTION, UNSPECIFIED LATERALITY: ICD-10-CM

## 2019-07-16 DIAGNOSIS — R26.81 UNSTEADY GAIT: Primary | ICD-10-CM

## 2019-07-16 DIAGNOSIS — T75.3XXA MOTION SICKNESS, INITIAL ENCOUNTER: ICD-10-CM

## 2019-07-16 PROCEDURE — 97112 NEUROMUSCULAR REEDUCATION: CPT | Performed by: PHYSICAL THERAPIST

## 2019-07-16 PROCEDURE — 97530 THERAPEUTIC ACTIVITIES: CPT | Performed by: PHYSICAL THERAPIST

## 2019-07-16 PROCEDURE — 97140 MANUAL THERAPY 1/> REGIONS: CPT | Performed by: PHYSICAL THERAPIST

## 2019-07-16 NOTE — PROGRESS NOTES
Daily Note     Today's date: 2019  Patient name: Anabel Rodriguez  : 1950  MRN: [id number]  Referring provider: Leslie Sow MD  Dx:   Encounter Diagnosis     ICD-10-CM    1  Unsteady gait R26 81    2  Motion sickness, initial encounter T75  3XXA    3  Disorder of vestibular function, unspecified laterality H81 90                   Subjective: Pt denies doing initial HEP  She was able to make an appt with neurology in September  Objective: See treatment diary below      Assessment: Tolerated treatment well  Patient would benefit from continued PT      Plan: Continue per plan of care              Precautions: fall risk, dizziness    Daily Treatment Diary       Manuals            STM C/S parasp, upper trap  10'           C/S traction + PROM                                       Exercise Diary              C/S AROM all planes  10x ea           Scap Sq  5"x10           Posture tband  10x ea red                                     Alt Cone Tap             Step Up F/L             SLR x3 no UE             Tandem Stance             Ball toss foam             Ball bounce/toss                          VOR  30"x2           Smooth Pursuit  30"x2           Convergence  30"x2                        Optokinetic videos  Std on foam 5'           Biodex                          C/S maze  1x std                        DIA 49/56            Modalities

## 2019-07-18 ENCOUNTER — HOSPITAL ENCOUNTER (OUTPATIENT)
Dept: MAMMOGRAPHY | Facility: CLINIC | Age: 69
Discharge: HOME | End: 2019-07-18
Payer: COMMERCIAL

## 2019-07-18 ENCOUNTER — OFFICE VISIT (OUTPATIENT)
Dept: PHYSICAL THERAPY | Facility: CLINIC | Age: 69
End: 2019-07-18
Payer: COMMERCIAL

## 2019-07-18 VITALS — BODY MASS INDEX: 35.15 KG/M2 | WEIGHT: 191 LBS | HEIGHT: 62 IN

## 2019-07-18 DIAGNOSIS — R26.81 UNSTEADY GAIT: Primary | ICD-10-CM

## 2019-07-18 DIAGNOSIS — H81.90 DISORDER OF VESTIBULAR FUNCTION, UNSPECIFIED LATERALITY: ICD-10-CM

## 2019-07-18 DIAGNOSIS — R92.8 ABNORMAL MAMMOGRAM: ICD-10-CM

## 2019-07-18 PROCEDURE — 77066 DX MAMMO INCL CAD BI: CPT

## 2019-07-18 PROCEDURE — 97112 NEUROMUSCULAR REEDUCATION: CPT | Performed by: PHYSICAL THERAPIST

## 2019-07-18 PROCEDURE — 97530 THERAPEUTIC ACTIVITIES: CPT | Performed by: PHYSICAL THERAPIST

## 2019-07-18 PROCEDURE — 97140 MANUAL THERAPY 1/> REGIONS: CPT | Performed by: PHYSICAL THERAPIST

## 2019-07-18 NOTE — PROGRESS NOTES
Daily Note     Today's date: 2019  Patient name: Charmaine Buchanan  : 1950  MRN: [id number]  Referring provider: Gabriela Grove MD  Dx:   Encounter Diagnosis     ICD-10-CM    1  Unsteady gait R26 81    2  Disorder of vestibular function, unspecified laterality H81 90                   Subjective: Pt reports she is feeling a little bit better since doing the exercises; she didn't have the "dizzy headache" she normally gets  Objective: See treatment diary below      Assessment: Tolerated treatment well  Patient would benefit from continued PT      Plan: Continue per plan of care              Precautions: fall risk, dizziness    Daily Treatment Diary       Manuals           STM C/S parasp, upper trap  10' 10'          C/S traction + PROM                                       Exercise Diary              C/S AROM all planes  10x ea 15x ea          Scap Sq  5"x10 5"x15          Posture tband  10x ea red 15x ea red                                    Alt Cone Tap             Step Up F/L             SLR x3 no UE   10x ea          Tandem Stance             Ball toss foam             Ball bounce/toss                          VOR  30"x2 1'x2          Smooth Pursuit  30"x2 1'x2          Convergence  30"x2 1'x2                       Optokinetic videos  Std on foam 5' Seated on ball 5'          Biodex                          C/S maze  1x std           C/S cross   1x std          DIA 49/56            Modalities

## 2019-07-19 NOTE — TELEPHONE ENCOUNTER
Sorry I am not sure  I suspect our office gave her a card from one of our opthalmologists but I did not have anyone in particular I wanted her to go to  If she is overdue and we do not have her prior eye doctor we can refer to somewhere close to her home for a diabetic eye exam   Please le met know and we can place the order    Thanks Adriel Long

## 2019-07-23 ENCOUNTER — OFFICE VISIT (OUTPATIENT)
Dept: PHYSICAL THERAPY | Facility: CLINIC | Age: 69
End: 2019-07-23
Payer: COMMERCIAL

## 2019-07-23 DIAGNOSIS — R26.81 UNSTEADY GAIT: Primary | ICD-10-CM

## 2019-07-23 DIAGNOSIS — H81.90 DISORDER OF VESTIBULAR FUNCTION, UNSPECIFIED LATERALITY: ICD-10-CM

## 2019-07-23 PROCEDURE — 97140 MANUAL THERAPY 1/> REGIONS: CPT | Performed by: PHYSICAL THERAPIST

## 2019-07-23 PROCEDURE — 97530 THERAPEUTIC ACTIVITIES: CPT | Performed by: PHYSICAL THERAPIST

## 2019-07-23 PROCEDURE — 97112 NEUROMUSCULAR REEDUCATION: CPT | Performed by: PHYSICAL THERAPIST

## 2019-07-23 NOTE — PROGRESS NOTES
Daily Note     Today's date: 2019  Patient name: Breanne Natarajan  : 1950  MRN: [id number]  Referring provider: Cade Leiva MD  Dx:   Encounter Diagnosis     ICD-10-CM    1  Unsteady gait R26 81    2  Disorder of vestibular function, unspecified laterality H81 90                   Subjective: pt reports she's feeling less dizziness and motion sickness  She is also feeling more relaxed  Objective: See treatment diary below      Assessment: Tolerated treatment well  Patient demonstrates good carryover of program  No complaints with additional activities today  Plan: Continue per plan of care              Precautions: fall risk, dizziness    Daily Treatment Diary       Manuals          STM C/S parasp, upper trap  10' 10' 10'         C/S traction + PROM                                       Exercise Diary              C/S AROM all planes  10x ea 15x ea 15x ea         Scap Sq  5"x10 5"x15 20x         Posture tband  10x ea red 15x ea red 20x ea red                                   Alt Cone Tap    20x          Step Up F/L             SLR x3 no UE   10x ea 15x ea         Tandem Stance             Ball toss foam    20x         Ball bounce/toss    10x ea                      VOR  30"x2 1'x2 1'x2         Smooth Pursuit  30"x2 1'x2 1'x2         Convergence  30"x2 1'x2 1'x2                      Optokinetic videos  Std on foam 5' Seated on ball 5' Std one foot on step 5'         Biodex                          C/S maze  1x std  2x std         C/S cross   1x std          DIA 49/56            Modalities

## 2019-07-25 ENCOUNTER — OFFICE VISIT (OUTPATIENT)
Dept: PHYSICAL THERAPY | Facility: CLINIC | Age: 69
End: 2019-07-25
Payer: COMMERCIAL

## 2019-07-25 DIAGNOSIS — R26.81 UNSTEADY GAIT: ICD-10-CM

## 2019-07-25 DIAGNOSIS — H81.90 DISORDER OF VESTIBULAR FUNCTION, UNSPECIFIED LATERALITY: Primary | ICD-10-CM

## 2019-07-25 PROCEDURE — 97530 THERAPEUTIC ACTIVITIES: CPT | Performed by: PHYSICAL THERAPIST

## 2019-07-25 PROCEDURE — 97140 MANUAL THERAPY 1/> REGIONS: CPT | Performed by: PHYSICAL THERAPIST

## 2019-07-25 PROCEDURE — 97112 NEUROMUSCULAR REEDUCATION: CPT | Performed by: PHYSICAL THERAPIST

## 2019-07-25 NOTE — PROGRESS NOTES
Daily Note     Today's date: 2019  Patient name: Susie Jacob  : 1950  MRN: [id number]  Referring provider: Lyn Vasquez MD  Dx:   Encounter Diagnosis     ICD-10-CM    1  Disorder of vestibular function, unspecified laterality H81 90    2  Unsteady gait R26 81                   Subjective: Pt reports feeling a little soreness in her right knee after last session but would like to continue with the exercises  She had not had any episodes of loss of balance or dizziness recently  Objective: See treatment diary below      Assessment: Tolerated treatment well  Patient would benefit from continued PT      Plan: Continue per plan of care              Precautions: fall risk, dizziness    Daily Treatment Diary       Manuals         STM C/S parasp, upper trap  10' 10' 10' 10'        C/S traction + PROM                                       Exercise Diary              C/S AROM all planes  10x ea 15x ea 15x ea 15x ea        Scap Sq  5"x10 5"x15 20x 20x        Posture tband  10x ea red 15x ea red 20x ea red 20x ea grn                                  Alt Cone Tap    20x  20x step        Step Up F/L             SLR x3 no UE   10x ea 15x ea 15x ea        Tandem Stance             Ball toss foam    20x 20x        Ball bounce/toss    10x ea 10x ea                     VOR  30"x2 1'x2 1'x2 1'x2        Smooth Pursuit  30"x2 1'x2 1'x2 1x'2        Convergence  30"x2 1'x2 1'x2 1'x2                     Optokinetic videos  Std on foam 5' Seated on ball 5' Std one foot on step 5' Seated on foam 4'        Biodex                          C/S maze  1x std  2x std 2x std foam        C/S cross   1x std          DIA 49/56            Modalities

## 2019-07-30 ENCOUNTER — TRANSCRIBE ORDERS (OUTPATIENT)
Dept: PHYSICAL THERAPY | Facility: CLINIC | Age: 69
End: 2019-07-30

## 2019-07-30 ENCOUNTER — OFFICE VISIT (OUTPATIENT)
Dept: PHYSICAL THERAPY | Facility: CLINIC | Age: 69
End: 2019-07-30
Payer: COMMERCIAL

## 2019-07-30 DIAGNOSIS — T75.3XXA MOTION SICKNESS, INITIAL ENCOUNTER: ICD-10-CM

## 2019-07-30 DIAGNOSIS — H81.90 DISORDER OF VESTIBULAR FUNCTION, UNSPECIFIED LATERALITY: Primary | ICD-10-CM

## 2019-07-30 DIAGNOSIS — R26.81 UNSTEADY GAIT: ICD-10-CM

## 2019-07-30 DIAGNOSIS — H81.90 NYSTAGMUS ASSOCIATED WITH DISORDERS OF THE VESTIBULAR SYSTEM: Primary | ICD-10-CM

## 2019-07-30 DIAGNOSIS — H55.09 NYSTAGMUS ASSOCIATED WITH DISORDERS OF THE VESTIBULAR SYSTEM: Primary | ICD-10-CM

## 2019-07-30 PROCEDURE — 97112 NEUROMUSCULAR REEDUCATION: CPT | Performed by: PHYSICAL THERAPIST

## 2019-07-30 PROCEDURE — 97530 THERAPEUTIC ACTIVITIES: CPT | Performed by: PHYSICAL THERAPIST

## 2019-07-30 PROCEDURE — 97140 MANUAL THERAPY 1/> REGIONS: CPT | Performed by: PHYSICAL THERAPIST

## 2019-07-30 NOTE — PROGRESS NOTES
Daily Note - Discharge Summary    Today's date: 2019  Patient name: Breanne Natarajan  : 1950  MRN: [id number]  Referring provider: Cade Leiva MD  Dx:   Encounter Diagnosis     ICD-10-CM    1  Disorder of vestibular function, unspecified laterality H81 90    2  Unsteady gait R26 81    3  Motion sickness, initial encounter T75  3XXA                   Subjective: Pt reports she is no longer having any dizziness or motion sickness and is very pleased with her progress  She was able to tolerate a 1 5-2 hour ride home without taking meclizine  She is happy to be discharged at this time  Objective: See treatment diary below      Assessment: Tolerated treatment well  Patient exhibited good technique with therapeutic exercises  Goals  STG - 2 weeks  1  Independent with HEP - MET  2  Balance in tandem stance for 10 seconds to indicate improved functional balance  - MET    LTG - 4 weeks  1  Increase score on Weiss Balance Scale by at least 5 points to indicate improved functional balance  - MET  2  Increase AROM C/S all planes by 25% or more to assess effect on dizziness and headaches  - min change  3   Tolerate all visuovestibular tasks without onset of dizziness to improve functional mobility  - MET    Plan: Discharge to HEP           Precautions: fall risk, dizziness    Daily Treatment Diary       Manuals        STM C/S parasp, upper trap  10' 10' 10' 10' 10'       C/S traction + PROM                                       Exercise Diary              C/S AROM all planes  10x ea 15x ea 15x ea 15x ea 15x ea       Scap Sq  5"x10 5"x15 20x 20x 20x       Posture tband  10x ea red 15x ea red 20x ea red 20x ea grn 20x ea grn                                 Alt Cone Tap    20x  20x step 20x step       Step Up F/L             SLR x3 no UE   10x ea 15x ea 15x ea 20x ea       Tandem Stance             Ball toss foam    20x 20x 20x       Ball bounce/toss    10x ea 10x ea 10x ea VOR  30"x2 1'x2 1'x2 1'x2 1'x2       Smooth Pursuit  30"x2 1'x2 1'x2 1x'2 1'x2       Convergence  30"x2 1'x2 1'x2 1'x2 1'x2                    Optokinetic videos  Std on foam 5' Seated on ball 5' Std one foot on step 5' Seated on foam 4'        Biodex                          C/S maze  1x std  2x std 2x std foam        C/S cross   1x std          DIA 49/56     55/56       Modalities

## 2019-07-30 NOTE — LETTER
2019    Leroy Frank MD  [address] [place] [address]    Patient: Lucina Atkins   YOB: 1950   Date of Visit: 2019     Encounter Diagnosis     ICD-10-CM    1  Disorder of vestibular function, unspecified laterality H81 90    2  Unsteady gait R26 81    3  Motion sickness, initial encounter T75  3XXA        Dear Dr Charlene Casillas:    Thank you for your recent referral of Lucina Atkins  Please review the attached evaluation summary from Chandrika's recent visit  Please verify that you agree with the plan of care by signing the attached order  If you have any questions or concerns, please do not hesitate to call  I sincerely appreciate the opportunity to share in the care of one of your patients and hope to have another opportunity to work with you in the near future  Sincerely,    Felipe Ray, PT      Referring Provider:      I certify that I have read the below Plan of Care and certify the need for these services furnished under this plan of treatment while under my care  Leroy Frank MD  [address] [place] [address]  VIA Facsimile: [phone]          Daily Note - Discharge Summary    Today's date: 2019  Patient name: Lucina Atkins  : 1950  MRN: [id number]  Referring provider: Leia Alanis MD  Dx:   Encounter Diagnosis     ICD-10-CM    1  Disorder of vestibular function, unspecified laterality H81 90    2  Unsteady gait R26 81    3  Motion sickness, initial encounter T75  3XXA                   Subjective: Pt reports she is no longer having any dizziness or motion sickness and is very pleased with her progress  She was able to tolerate a 1 5-2 hour ride home without taking meclizine  She is happy to be discharged at this time  Objective: See treatment diary below      Assessment: Tolerated treatment well  Patient exhibited good technique with therapeutic exercises  Goals  STG - 2 weeks  1   Independent with HEP - MET  2  Balance in tandem stance for 10 seconds to indicate improved functional balance  - MET    LTG - 4 weeks  1  Increase score on Dia Balance Scale by at least 5 points to indicate improved functional balance  - MET  2  Increase AROM C/S all planes by 25% or more to assess effect on dizziness and headaches  - min change  3   Tolerate all visuovestibular tasks without onset of dizziness to improve functional mobility  - MET    Plan: Discharge to [facility]           Precautions: fall risk, dizziness    Daily Treatment Diary       Manuals 7/2 7/16 7/18 7/23 7/25 7/30       STM C/S parasp, upper trap  10' 10' 10' 10' 10'       C/S traction + PROM                                       Exercise Diary              C/S AROM all planes  10x ea 15x ea 15x ea 15x ea 15x ea       Scap Sq  5"x10 5"x15 20x 20x 20x       Posture tband  10x ea red 15x ea red 20x ea red 20x ea grn 20x ea grn                                 Alt Cone Tap    20x  20x step 20x step       Step Up F/L             SLR x3 no UE   10x ea 15x ea 15x ea 20x ea       Tandem Stance             Ball toss foam    20x 20x 20x       Ball bounce/toss    10x ea 10x ea 10x ea                    VOR  30"x2 1'x2 1'x2 1'x2 1'x2       Smooth Pursuit  30"x2 1'x2 1'x2 1x'2 1'x2       Convergence  30"x2 1'x2 1'x2 1'x2 1'x2                    Optokinetic videos  Std on foam 5' Seated on ball 5' Std one foot on step 5' Seated on foam 4'        Biodex                          C/S maze  1x std  2x std 2x std foam        C/S cross   1x std          DIA 49/56     55/56       Modalities

## 2019-08-28 ENCOUNTER — CONSULT (OUTPATIENT)
Dept: GASTROENTEROLOGY | Facility: MEDICAL CENTER | Age: 69
End: 2019-08-28
Payer: COMMERCIAL

## 2019-08-28 VITALS
HEART RATE: 76 BPM | WEIGHT: 189 LBS | TEMPERATURE: 98.6 F | SYSTOLIC BLOOD PRESSURE: 126 MMHG | DIASTOLIC BLOOD PRESSURE: 70 MMHG | HEIGHT: 62 IN | BODY MASS INDEX: 34.78 KG/M2

## 2019-08-28 DIAGNOSIS — Z80.0 FAMILY HISTORY OF COLORECTAL CANCER: ICD-10-CM

## 2019-08-28 DIAGNOSIS — Z12.11 SCREENING FOR COLON CANCER: Primary | ICD-10-CM

## 2019-08-28 PROCEDURE — 99243 OFF/OP CNSLTJ NEW/EST LOW 30: CPT | Performed by: INTERNAL MEDICINE

## 2019-08-28 NOTE — PATIENT INSTRUCTIONS
Please schedule Colonoscopy with Dr Cheryl Heck summer of 2020 at [address] instructions given to patient

## 2019-08-29 NOTE — PROGRESS NOTES
[facility] - Outpatient Consultation  Madhu Distance 71 y o  female MRN: [id number]  Encounter: [id number]          ASSESSMENT AND PLAN:      1  Family history of colorectal cancer  2  Screening for colorectal cancer  - Ambulatory referral to Gastroenterology  - Colonoscopy; Future  - Na Sulfate-K Sulfate-Mg Sulf (SUPREP BOWEL PREP KIT) 17 5-3 13-1 6 GM/177ML SOLN; Take 177 mL by mouth once for 1 dose  Dispense: 2 Bottle; Refill: 0    We discussed the risk and benefit procedure  Will be planning for the procedure at our outpatient endoscopy center  This will be her index colonoscopy and she is at higher risk due to her age as well as no prior colonoscopy evaluation in setting of known family history  They would prefer to plan for colonoscopy in January of 2020  All risk and benefit of the procedure were discussed  If colonoscopy is negative she will need a repeat colonoscopy in 5 years as she had a first-degree relative was diagnosed in her 62s with colorectal cancer     ______________________________________________________________________    HPI:      She is a 57-year-old female presents here for her index screening colonoscopy  She does have family history of colorectal cancer with 1 of her parents being diagnosed in their 62s  She denies any acute symptoms  She denies any chest pain, shortness of breath, or previous complications of anesthesia  REVIEW OF SYSTEMS:    CONSTITUTIONAL: Denies any fever, chills, rigors, and weight loss  HEENT: No earache or tinnitus  Denies hearing loss or visual disturbances  CARDIOVASCULAR: No chest pain or palpitations  RESPIRATORY: Denies any cough, hemoptysis, shortness of breath or dyspnea on exertion  GASTROINTESTINAL: As noted in the History of Present Illness  GENITOURINARY: No problems with urination  Denies any hematuria or dysuria  NEUROLOGIC: No dizziness or vertigo, denies headaches     MUSCULOSKELETAL: Denies any muscle or joint pain  SKIN: Denies skin rashes or itching  ENDOCRINE: Denies excessive thirst  Denies intolerance to heat or cold  PSYCHOSOCIAL: Denies depression or anxiety  Denies any recent memory loss         Historical Information   Past Medical History:   Diagnosis Date    Anxiety     Arthritis     B-complex deficiency     Depression     Diabetes mellitus ([address] )     Glaucoma     Gout     Hypercalcemia     Hyperlipidemia     Last assessed: 8/5/15    Irregular heart beat     Memory loss     Palpitations     Suicidal ideation      Past Surgical History:   Procedure Laterality Date    TUBAL LIGATION      VAGINAL DELIVERY      x2     Social History   Social History     Substance and Sexual Activity   Alcohol Use No     Social History     Substance and Sexual Activity   Drug Use No     Social History     Tobacco Use   Smoking Status Never Smoker   Smokeless Tobacco Never Used     Family History   Problem Relation Age of Onset    Hypertension Mother         Benign Essential    Cancer Mother     Rectal cancer Mother    [facility] Colon cancer Mother         unknown age of onset   [facility] Hypertension Father         Benign Essential    Cancer Father     Diabetes Sister     Cirrhosis Daughter         Hepatic    Hepatitis Daughter         Type C Viral    Pancreatic cancer Cousin     No Known Problems Maternal Grandmother     No Known Problems Maternal Grandfather     No Known Problems Paternal Grandmother     No Known Problems Paternal Grandfather     No Known Problems Sister     No Known Problems Sister     No Known Problems Sister     No Known Problems Sister     No Known Problems Sister     No Known Problems Sister     Breast cancer Cousin        Meds/Allergies       Current Outpatient Medications:     albuterol (PROAIR HFA) 90 mcg/act inhaler    amLODIPine (NORVASC) 10 mg tablet    aspirin 81 MG tablet    atorvastatin (LIPITOR) 40 mg tablet    cetirizine (ZyrTEC) 10 mg tablet    Cholecalciferol (VITAMIN D3) 2000 units capsule    fluticasone (FLONASE) 50 mcg/act nasal spray    hydrochlorothiazide (HYDRODIURIL) 12 5 mg tablet    losartan (COZAAR) 100 MG tablet    meclizine (ANTIVERT) 25 mg tablet    potassium chloride (K-DUR,KLOR-CON) 10 mEq tablet    sertraline (ZOLOFT) 25 mg tablet    Na Sulfate-K Sulfate-Mg Sulf (SUPREP BOWEL PREP KIT) 17 5-3 13-1 6 GM/177ML SOLN    Allergies   Allergen Reactions    Lisinopril Cough    Other      Seasonal allergies and animal fur           Objective     Blood pressure 126/70, pulse 76, temperature 98 6 °F (37 °C), temperature source Tympanic, height 5' 2" (1 575 m), weight 85 7 kg (189 lb)  Body mass index is 34 57 kg/m²  PHYSICAL EXAM:      General Appearance:   Alert, cooperative, no distress   HEENT:   Normocephalic, atraumatic, anicteric      Neck:  Supple, symmetrical, trachea midline   Lungs:   Clear to auscultation bilaterally; no rales, rhonchi or wheezing; respirations unlabored    Heart[de-identified]   Regular rate and rhythm; no murmur, rub, or gallop  Abdomen:   Soft, non-tender, non-distended; normal bowel sounds; no masses, no organomegaly    Genitalia:   Deferred    Rectal:   Deferred    Extremities:  No cyanosis, clubbing or edema    Pulses:  2+ and symmetric    Skin:  No jaundice, rashes, or lesions    Lymph nodes:  No palpable cervical lymphadenopathy        Lab Results:   No visits with results within 1 Day(s) from this visit  Latest known visit with results is:   Lab on 07/13/2019   Component Date Value    IGA 07/13/2019 448 0*    IGG 07/13/2019 1,770 0*    IGM 07/13/2019 74 0          Radiology Results:   No results found

## 2019-09-10 ENCOUNTER — OFFICE VISIT (OUTPATIENT)
Dept: INTERNAL MEDICINE CLINIC | Facility: CLINIC | Age: 69
End: 2019-09-10
Payer: COMMERCIAL

## 2019-09-10 VITALS
WEIGHT: 189 LBS | OXYGEN SATURATION: 97 % | TEMPERATURE: 98.6 F | DIASTOLIC BLOOD PRESSURE: 82 MMHG | BODY MASS INDEX: 34.78 KG/M2 | HEART RATE: 76 BPM | SYSTOLIC BLOOD PRESSURE: 116 MMHG | HEIGHT: 62 IN

## 2019-09-10 DIAGNOSIS — I10 BENIGN ESSENTIAL HTN: ICD-10-CM

## 2019-09-10 DIAGNOSIS — E11.9 TYPE 2 DIABETES MELLITUS WITHOUT COMPLICATION, WITHOUT LONG-TERM CURRENT USE OF INSULIN (HCC): Primary | ICD-10-CM

## 2019-09-10 DIAGNOSIS — E87.6 HYPOKALEMIA: ICD-10-CM

## 2019-09-10 DIAGNOSIS — F41.9 ANXIETY: ICD-10-CM

## 2019-09-10 DIAGNOSIS — F34.1 DYSTHYMIA: ICD-10-CM

## 2019-09-10 DIAGNOSIS — E78.5 DYSLIPIDEMIA: ICD-10-CM

## 2019-09-10 PROBLEM — R42 DIZZINESS: Status: RESOLVED | Noted: 2019-01-25 | Resolved: 2019-09-10

## 2019-09-10 PROCEDURE — 3074F SYST BP LT 130 MM HG: CPT | Performed by: INTERNAL MEDICINE

## 2019-09-10 PROCEDURE — 3008F BODY MASS INDEX DOCD: CPT | Performed by: INTERNAL MEDICINE

## 2019-09-10 PROCEDURE — 99214 OFFICE O/P EST MOD 30 MIN: CPT | Performed by: INTERNAL MEDICINE

## 2019-09-10 PROCEDURE — 3079F DIAST BP 80-89 MM HG: CPT | Performed by: INTERNAL MEDICINE

## 2019-09-10 NOTE — PROGRESS NOTES
Assessment/Plan:    Type 2 diabetes mellitus without complication, without long-term current use of insulin (HCC)  Lab Results   Component Value Date    HGBA1C 6 6 (H) 06/03/2019     Continue to monitor off diabetic medications  Discussed diet and exercise  Benign essential HTN  Controlled  Continue with current antihypertensive regimen  Anxiety  Uncontrolled, Will increase Zoloft to 50 mg daily  She will be moving back to [place] for the winter and wont be back until the early summer  She is to contact me for any adverse effects  Body mass index (BMI) of 33 0-33 9 in adult  Discussed diet and exercise  Dyslipidemia  Continue Lipitor 40 mg daily  Hypokalemia  Continue potassium supplementation, recheck potassium with next set of laboratory studies  Diagnoses and all orders for this visit:    Type 2 diabetes mellitus without complication, without long-term current use of insulin ([facility])    Dysthymia  -     sertraline (ZOLOFT) 50 mg tablet; Take 1 tablet (50 mg total) by mouth daily    Anxiety  -     sertraline (ZOLOFT) 50 mg tablet; Take 1 tablet (50 mg total) by mouth daily    Benign essential HTN    Body mass index (BMI) of 33 0-33 9 in adult    Dyslipidemia    Hypokalemia          BMI Counseling: Body mass index is 34 57 kg/m²  The BMI is above normal  Nutrition recommendations include reducing portion sizes, decreasing overall calorie intake, 3-5 servings of fruits/vegetables daily, reducing fast food intake, consuming healthier snacks, decreasing soda and/or juice intake, moderation in carbohydrate intake, increasing intake of lean protein, reducing intake of saturated fat and trans fat and reducing intake of cholesterol  Exercise recommendations include moderate aerobic physical activity for 150 minutes/week and exercising 3-5 times per week  Subjective:      Patient ID: Larissa Alarcon is a 71 y o  female      71year old female is seen today for follow-up of chronic conditions  No laboratory studies to review today  Regarding her anxiety, she reported significant improvement in anxiety while she was weaning off Prozac and starting Zoloft however since discontinuing Prozac and continuing Zoloft 25 mg daily, her anxiety has returned if not worsen  She denies any adverse effects with Zoloft  Diabetes   She has type 2 diabetes mellitus  Her disease course has been stable  Hypoglycemia symptoms include nervousness/anxiousness  Pertinent negatives for hypoglycemia include no dizziness or headaches  Pertinent negatives for diabetes include no chest pain, no fatigue and no weakness  There are no hypoglycemic complications  Symptoms are stable  There are no diabetic complications  Risk factors for coronary artery disease include diabetes mellitus  Current diabetic treatment includes diet  Her weight is stable  An ACE inhibitor/angiotensin II receptor blocker is being taken  Hypertension   This is a chronic problem  The current episode started more than 1 year ago  The problem is unchanged  The problem is controlled  Associated symptoms include anxiety  Pertinent negatives include no chest pain, headaches, palpitations or shortness of breath  Risk factors for coronary artery disease include diabetes mellitus, obesity and post-menopausal state  Past treatments include angiotensin blockers, calcium channel blockers and diuretics  The current treatment provides moderate improvement  Compliance problems include exercise  Anxiety   Presents for follow-up visit  Symptoms include excessive worry, irritability and nervous/anxious behavior  Patient reports no chest pain, dizziness, nausea, palpitations, shortness of breath or suicidal ideas  The severity of symptoms is moderate  The patient sleeps 8 hours per night  Compliance with medications is %         The following portions of the patient's history were reviewed and updated as appropriate: allergies, current medications, past family history, past medical history, past social history, past surgical history and problem list     Review of Systems   Constitutional: Positive for irritability  Negative for activity change, appetite change, chills, diaphoresis, fatigue and fever  HENT: Negative for congestion, postnasal drip, rhinorrhea, sinus pressure, sinus pain, sneezing and sore throat  Eyes: Negative for visual disturbance  Respiratory: Negative for apnea, cough, choking, chest tightness, shortness of breath and wheezing  Cardiovascular: Negative for chest pain, palpitations and leg swelling  Gastrointestinal: Negative for abdominal distention, abdominal pain, anal bleeding, blood in stool, constipation, diarrhea, nausea and vomiting  Endocrine: Negative for cold intolerance and heat intolerance  Genitourinary: Negative for difficulty urinating, dysuria and hematuria  Musculoskeletal: Negative  Skin: Negative  Neurological: Negative for dizziness, weakness, light-headedness, numbness and headaches  Hematological: Negative for adenopathy  Psychiatric/Behavioral: Negative for agitation, sleep disturbance and suicidal ideas  The patient is nervous/anxious  All other systems reviewed and are negative          Past Medical History:   Diagnosis Date    Anxiety     Arthritis     B-complex deficiency     Depression     Diabetes mellitus [facility] [address] [facility]     Glaucoma     Gout     Hypercalcemia     Hyperlipidemia     Last assessed: 8/5/15    Irregular heart beat     Memory loss     Palpitations     Suicidal ideation          Current Outpatient Medications:     albuterol (PROAIR HFA) 90 mcg/act inhaler, Inhale 2 puffs as needed for wheezing or shortness of breath, Disp: 1 Inhaler, Rfl: 3    amLODIPine (NORVASC) 10 mg tablet, Take 1 tablet (10 mg total) by mouth daily, Disp: 90 tablet, Rfl: 1    aspirin 81 MG tablet, Take 1 tablet by mouth daily, Disp: , Rfl:     atorvastatin (LIPITOR) 40 mg tablet, TAKE 1 TABLET(40 MG) BY MOUTH DAILY, Disp: 90 tablet, Rfl: 0    cetirizine (ZyrTEC) 10 mg tablet, Take 1 tablet (10 mg total) by mouth daily, Disp: 90 tablet, Rfl: 1    Cholecalciferol (VITAMIN D3) 2000 units capsule, Take 1 capsule by mouth daily, Disp: , Rfl:     fluticasone (FLONASE) 50 mcg/act nasal spray, 1 spray into each nostril as needed  , Disp: , Rfl:     hydrochlorothiazide (HYDRODIURIL) 12 5 mg tablet, Take 1 tablet (12 5 mg total) by mouth daily, Disp: 90 tablet, Rfl: 1    losartan (COZAAR) 100 MG tablet, Take 1 tablet (100 mg total) by mouth daily, Disp: 90 tablet, Rfl: 1    meclizine (ANTIVERT) 25 mg tablet, Take 1 tablet (25 mg total) by mouth every 12 (twelve) hours as needed for dizziness, Disp: 30 tablet, Rfl: 1    potassium chloride (K-DUR,KLOR-CON) 10 mEq tablet, Take 1 tablet (10 mEq total) by mouth daily, Disp: 90 tablet, Rfl: 1    sertraline (ZOLOFT) 50 mg tablet, Take 1 tablet (50 mg total) by mouth daily, Disp: 90 tablet, Rfl: 2    Na Sulfate-K Sulfate-Mg Sulf (SUPREP BOWEL PREP KIT) 17 5-3 13-1 6 GM/177ML SOLN, Take 177 mL by mouth once for 1 dose, Disp: 2 Bottle, Rfl: 0    Allergies   Allergen Reactions    Lisinopril Cough    Other      Seasonal allergies and animal fur       Social History   Past Surgical History:   Procedure Laterality Date    TUBAL LIGATION      VAGINAL DELIVERY      x2     Family History   Problem Relation Age of Onset    Hypertension Mother         Benign Essential    Cancer Mother     Rectal cancer Mother     Colon cancer Mother         unknown age of onset   [facility] Hypertension Father         Benign Essential    Cancer Father     Diabetes Sister     Cirrhosis Daughter         Hepatic    Hepatitis Daughter         Type C Viral    Pancreatic cancer Cousin     No Known Problems Maternal Grandmother     No Known Problems Maternal Grandfather     No Known Problems Paternal Grandmother     No Known Problems Paternal Grandfather     No Known Problems Sister     No Known Problems Sister     No Known Problems Sister     No Known Problems Sister     No Known Problems Sister     No Known Problems Sister     Breast cancer Cousin        Objective:  /82 (BP Location: Left arm, Patient Position: Sitting, Cuff Size: Large)   Pulse 76   Temp 98 6 °F (37 °C) (Oral)   Ht 5' 2" (1 575 m)   Wt 85 7 kg (189 lb)   SpO2 97% Comment: room air  BMI 34 57 kg/m²     No results found for this or any previous visit (from the past 1344 hour(s))  Physical Exam   Constitutional: She is oriented to person, place, and time  She appears well-developed and well-nourished  No distress  HENT:   Head: Normocephalic and atraumatic  Eyes: Pupils are equal, round, and reactive to light  Conjunctivae and EOM are normal  Right eye exhibits no discharge  Left eye exhibits no discharge  Neck: Normal range of motion  Neck supple  No JVD present  No thyromegaly present  Cardiovascular: Normal rate, regular rhythm, normal heart sounds and intact distal pulses  Exam reveals no gallop and no friction rub  No murmur heard  Pulmonary/Chest: Effort normal and breath sounds normal  No respiratory distress  She has no wheezes  She has no rales  She exhibits no tenderness  Abdominal: Soft  She exhibits no distension  There is no tenderness  Musculoskeletal: Normal range of motion  She exhibits no edema, tenderness or deformity  Lymphadenopathy:     She has no cervical adenopathy  Neurological: She is alert and oriented to person, place, and time  No cranial nerve deficit  Coordination normal    Skin: Skin is warm and dry  No rash noted  She is not diaphoretic  No erythema  No pallor  Psychiatric: She has a normal mood and affect  Her behavior is normal  Judgment and thought content normal    Nursing note and vitals reviewed

## 2019-09-10 NOTE — PROGRESS NOTES
Diabetic Foot Exam    Patient's shoes and socks removed  Right Foot/Ankle   Right Foot Inspection  Skin Exam: skin normal and skin intact no dry skin, no warmth, no callus, no erythema, no maceration, no abnormal color, no pre-ulcer, no ulcer and no callus                          Toe Exam: ROM and strength within normal limits  Sensory       Monofilament testing: intact  Vascular  Capillary refills: < 3 seconds  The right DP pulse is 2+  The right PT pulse is 2+  Left Foot/Ankle  Left Foot Inspection  Skin Exam: skin normal and skin intactno dry skin, no warmth, no erythema, no maceration, normal color, no pre-ulcer, no ulcer and no callus                         Toe Exam: ROM and strength within normal limits                   Sensory       Monofilament: intact  Vascular  Capillary refills: < 3 seconds  The left DP pulse is 2+  The left PT pulse is 2+  Assign Risk Category:  No deformity present; No loss of protective sensation;  No weak pulses       Risk: 0

## 2019-09-10 NOTE — ASSESSMENT & PLAN NOTE
Uncontrolled, Will increase Zoloft to 50 mg daily  She will be moving back to [id number] [facility] for the winter and wont be back until the early summer  She is to contact me for any adverse effects

## 2019-09-10 NOTE — ASSESSMENT & PLAN NOTE
Lab Results   Component Value Date    HGBA1C 6 6 (H) 06/03/2019     Continue to monitor off diabetic medications  Discussed diet and exercise

## 2019-09-12 NOTE — PROGRESS NOTES
DEPARTMENT OF NEUROLOGICAL SCIENCES  [address] [facility]        NEW PATIENT EVALUATION NOTE    Patient: Romayne Cross  Medical Record Number: # [id number]  YOB: 1950  Date of visit: 9/13/2019    Referring provider: Tonya Reno MD    ASSESSMENT     Diagnoses for this encounter:  1  Cervical dystonia  cyclobenzaprine (FLEXERIL) 10 mg tablet    MRI brain w wo contrast   2  Tremor  Ambulatory referral to Neurology    MRI brain w wo contrast   3  Unsteady gait  Ambulatory referral to Neurology    MRI brain w wo contrast   4  Neck tightness  cyclobenzaprine (FLEXERIL) 10 mg tablet   5  History of vertigo  MRI brain w wo contrast     Impression of this 72 yo lady with chronic reported history of tightness of back of neck and spine, some mild gait imbalance without falls, very rare vertigo, and resting head > hand tremor  She did report a traumatic accident with her hitting her head ten years prior to start of the symptoms, which themselves occurred 10+ years ago  She has never before sought treatment for these until now  No associated family history  She has a high degree of anxiety at baseline and on Zoloft now  Her tremors are not currently bothersome to her or impairing her except as a curiosity  She denies any headache or neck pain  She has been to Physical Therapy before  Examination today shows high tension in the base of the neck near trapezius and shoulder muscles, with limited range of motion to head and neck and TWSTRS of 10  She does have a very fine amplitude, fast mixed head tremor more yes-yes  No positional dependence  No parkinsonism  She does not have resting tremor  Her Tandem gait is impaired but otherwise normal  Overall, she has potentially spasmodic torticollis accounting for her lateral shift and enhanced tone in neck - this might be contributing to the head tremor  She may have enhancement of tremor from her baseline anxiety  Her walking is more fear of falling than truly falling  We suggested that she consider using a cane for extra point of contact to ground and confidence  I did not find any signs concerning for a progressive neurological condition  Proceed as below  PLAN     · Reviewed normal CMP, CBC from July 2019  Previously normal magnesium, and polyclonal gammopathy on SPEP, normal TSH and elevated HbA1c at 6 6  · No recent or relevant neuroimaging results to review  · MRI Brain w/wo contrast  · She will continue the training techniques from PT at home for the balance as it was greatly helping her  · She will perform her neck stretching and will start on Flexeril 10mg tablet, but can do half tablet at a time up to three times a day as needed for neck tightness  · If neck continues to be tight and if symptoms are bothersome, will consider botulinum toxin injections at later date  · Thank you very much for sending me this interesting patient  · The patient has been instructed to call us about any new neurological problems or medication side effects  · Return to Clinic in 6 months    A total of 60 minutes were spent face-to-face with this patient, of which 25% was spent on counseling and coordination of care  We discussed the natural history of the patient's condition, differential diagnosis, level of diagnostic certainty, treatment alternatives and their side effects and possible complications  HISTORY OF PRESENT ILLNESS:     Ms Jamshid Palafox is a 71 y o  right handed female with Type II DM and hypergammaglobulinemia who has been referred to the [facility] for evaluation of tremors and unsteady gait  The patient was accompanied today  History was obtained from patient and granddaughter  Referred from PCP    Main bothersome neurological symptoms today are:   1  Tightness of the neck - this has been going on for more than 10 years   She has been seen by chiropractor before with manipulations  She denies ever trying any muscle relaxants  She did feel much better while doing the PT, and she has been doing less of it lately at home  2  Gait imbalance - chronic for 10+ years and she says when she wakes up during the night to go to the bathroom and her legs hurt when she walks around  She will initially walk slower and more hunched but improves over a short period of time  At times she feels herself nearly falling backwards and steps back to guard, never any true falls  No loss of consciousness  She denies any lightheadedness, but does she feels she needs to slowly adjust when she stands up  She states she drinks at least 32oz fluid a day  She denies freezing of gait  3  Tremors - chronic as well, mainly in her head, and also in her hands  She has a high level of anxiety and recently her Prozac was switched over to Zoloft due to worsened anxiety  The tremors are not currently affecting her daily life, and intermittent and variable  They think that the tremors have worsened over time  The dose has increased  Pt denies any resting tremor  4  Very sporadic vertigo - around four times in several years, random and sudden and without warning  This is relatively rare  Pt historically had complained about persistent dizziness to PCP, previously being treated for hypokalemia  She has hx of motion sickness and at least 2009  It was surmised she may have orthostatic hypotension and her HCTZ was lowered  By May 2019 her vertigo had resolve she says  In June 2019 she continue to have gait problems and evaluated by PT who noted hypertonicity of the cervical spine musculature and suggested Neurology evaluation  She completed PT for vestibular at end of July 2019  No hx of head injuries or surgeries  Current Relevant Medications: Zoloft 50mg qDay for anxiety  Living Situation + ADLs: Retired   Currently living at home with  usually, but sometimes visits her granddaughter  Independent in her ADLs  She has her 's license but does not drive since "years" voluntarily  She handles her own finances well per family  Family History: Number of First Degree Relatives With Parkinsonism/Dementia: none, and none with tremors       REVIEW OF PAST MEDICAL, SOCIAL AND FAMILY HISTORY:  This is the list of problems as per our Medical Records:    Patient Active Problem List    Diagnosis Date Noted    Anxiety 07/12/2019    Environmental allergies 06/04/2019    Unsteady gait 06/04/2019    Motion sickness 06/04/2019    Family history of colorectal cancer 06/04/2019    Calculus of gallbladder without cholecystitis without obstruction 11/16/2018    Abnormal SPEP 07/27/2018    Elevated blood protein 07/13/2018    Abnormal mammogram 05/17/2018    Screening for colon cancer 05/17/2018    Poor dentition 05/17/2018    Body mass index (BMI) of 33 0-33 9 in adult 05/17/2018    Need for shingles vaccine 05/17/2018    Skin lesion of cheek 11/10/2017    Tremor 11/10/2017    Abnormal mammogram of right breast 07/06/2017    Allergic rhinitis 04/11/2017    Dyslipidemia 07/29/2016    Hypokalemia 01/14/2016    Osteoarthritis of right knee 06/03/2015    DJD (degenerative joint disease) of knee 05/13/2015    Benign essential HTN 01/14/2015    Depression 01/14/2015    Type 2 diabetes mellitus without complication, without long-term current use of insulin ([facility] [address] ) 01/14/2015       Past Medical History:   Diagnosis Date    Anxiety     Arthritis     B-complex deficiency     Depression     Diabetes mellitus ([address] )     Glaucoma     Gout     Hypercalcemia     Hyperlipidemia     Last assessed: 8/5/15    Irregular heart beat     Memory loss     Palpitations     Suicidal ideation         Past Surgical History:   Procedure Laterality Date    TUBAL LIGATION      VAGINAL DELIVERY      x2        Allergies   Allergen Reactions    Lisinopril Cough    Other      Seasonal allergies and animal fur Outpatient Encounter Medications as of 9/13/2019   Medication Sig Dispense Refill    albuterol (PROAIR HFA) 90 mcg/act inhaler Inhale 2 puffs as needed for wheezing or shortness of breath 1 Inhaler 3    amLODIPine (NORVASC) 10 mg tablet Take 1 tablet (10 mg total) by mouth daily 90 tablet 1    aspirin 81 MG tablet Take 1 tablet by mouth daily      atorvastatin (LIPITOR) 40 mg tablet TAKE 1 TABLET(40 MG) BY MOUTH DAILY 90 tablet 0    cetirizine (ZyrTEC) 10 mg tablet Take 1 tablet (10 mg total) by mouth daily 90 tablet 1    Cholecalciferol (VITAMIN D3) 2000 units capsule Take 1 capsule by mouth daily      fluticasone (FLONASE) 50 mcg/act nasal spray 1 spray into each nostril as needed        hydrochlorothiazide (HYDRODIURIL) 12 5 mg tablet Take 1 tablet (12 5 mg total) by mouth daily 90 tablet 1    losartan (COZAAR) 100 MG tablet Take 1 tablet (100 mg total) by mouth daily 90 tablet 1    meclizine (ANTIVERT) 25 mg tablet Take 1 tablet (25 mg total) by mouth every 12 (twelve) hours as needed for dizziness 30 tablet 1    potassium chloride (K-DUR,KLOR-CON) 10 mEq tablet Take 1 tablet (10 mEq total) by mouth daily 90 tablet 1    sertraline (ZOLOFT) 50 mg tablet Take 1 tablet (50 mg total) by mouth daily 90 tablet 2    cyclobenzaprine (FLEXERIL) 10 mg tablet Take 1 tablet (10 mg total) by mouth 3 (three) times a day as needed for muscle spasms Follow instructions for titration  90 tablet 0    Na Sulfate-K Sulfate-Mg Sulf (SUPREP BOWEL PREP KIT) 17 5-3 13-1 6 GM/177ML SOLN Take 177 mL by mouth once for 1 dose 2 Bottle 0    [DISCONTINUED] sertraline (ZOLOFT) 25 mg tablet Take 1 tablet (25 mg total) by mouth daily 30 tablet 5     No facility-administered encounter medications on file as of 9/13/2019          Social History     Tobacco Use    Smoking status: Never Smoker    Smokeless tobacco: Never Used   Substance Use Topics    Alcohol use: No        Family History   Problem Relation Age of Onset    Hypertension Mother         Benign Essential    Cancer Mother     Rectal cancer Mother    [place] Colon cancer Mother         unknown age of onset   [place] Hypertension Father         Benign Essential    Cancer Father     Diabetes Sister     Cirrhosis Daughter         Hepatic    Hepatitis Daughter         Type C Viral    Pancreatic cancer Cousin     No Known Problems Maternal Grandmother     No Known Problems Maternal Grandfather     No Known Problems Paternal Grandmother     No Known Problems Paternal Grandfather     No Known Problems Sister     No Known Problems Sister     No Known Problems Sister     No Known Problems Sister     No Known Problems Sister     No Known Problems Sister     Breast cancer Cousin         REVIEW OF SYSTEMS:  The patient has entered data on an intake form regarding present illness, past medical and surgical history, medications, allergies, family and social history, and a full review of 14 systems  I have reviewed this form with the patient, and all the relevant information has been included on this note  The full review of systems was negative except as stated in HPI and below  Constitutional: Negative  Negative for appetite change and fever  HENT: Negative  Negative for hearing loss, tinnitus, trouble swallowing and voice change  Eyes: Negative  Negative for photophobia and pain  Respiratory: Negative  Negative for shortness of breath  Cardiovascular: Negative  Negative for palpitations  Gastrointestinal: Negative  Negative for nausea and vomiting  Endocrine: Negative  Negative for cold intolerance and heat intolerance  Genitourinary: Negative for dysuria and urgency  Loss of bladder control   Musculoskeletal: Negative  Negative for myalgias and neck pain  Skin: Negative  Negative for rash  Neurological: Positive for tremors  Negative for dizziness, seizures, syncope, facial asymmetry, speech difficulty, weakness, light-headedness, numbness and headaches  Waking up at night   Memory Problem   Snoring   Clumsiness   Balance problem   Hematological: Negative  Does not bruise/bleed easily  Psychiatric/Behavioral: Negative for confusion, hallucinations and sleep disturbance  Anxiety     PHYSICAL EXAMINATION:     Vital signs:  /72 (BP Location: Right arm, Patient Position: Sitting, Cuff Size: Standard)   Pulse 81   Ht 5' 2" (1 575 m)   Wt 85 3 kg (188 lb)   BMI 34 39 kg/m²     General:  Well-appearing, well nourished, pleasant patient in no acute distress  Mood and Fund of Knowledge are appropriate  Head:  Normocephalic, atraumatic  Oropharynx and conjunctiva are clear  Speech  No hypophonia, no bradylalia  No scanning speech  Language: Comprehension intact  Neck:  Supple, strong 5/5 forward flexion and retroflexion  Extremities: Range of motion is normal       Cognitive and Mental Exam:  The patient is alert, oriented to self, location, date and situation  Memory is normal to provide accurate details of health history    Cranial Nerves:  CN II:  Direct and consensual light reflexes were equally reactive to light symmetrically  No afferent pupillary defect   Visual fields are full to confrontation  CN III / IV / VI: Extraocular movements were full, with normal pursuit and saccades  CN V:   Facial sensation to light touch was intact  CN VII: Face is symmetric with normal strength  CN VIII: Hearing was assessed using the Calibrated Finger Rub Auditory Screening Test (CALFRAST) and was not abnormal (Better than CALFRAST-Strong-70)  CN X:   Palate is up going bilaterally and symmetrically  CN XI:  Neck muscles are strong  CN XII: Tongue protrusion is at midline with normal movements  No dysarthria  Motor:    Dystonia: yes, R lateral shift in neck  She can turn head 45 degrees to the L, to 60degrees on the R, limited to 30-45 degrees backwards  Dyskinesia: none  Myoclonus: none  Chorea: none  Tics: none  TWSTRS   Severity scale: 10  Maximal excursion: rotation 2 (25° to 45), laterocollis 0 (0° to 0); Anterocollis or retrocollis 0 (none), +lateral shift 1; no saggital shift 0  Duration (x2 weight): 3 occasional 50-75%  Effects of sensory tricks: 0  No Shoulder elevation/ Anterior displacement  Range of Motion: 1, well past midline  Time (up to 60 sec): 0  )     Head has mild mixed predominantly yes-yes tremor  No torticollis        [id number]                Time since last dose:   9/13/19      Speech  0     Facial Expression  0    Postural Tremor (Right) 0    Postural Tremor (Left) 0    Kinetic Tremor (Right)  0    Kinetic Tremor (Left)  0    Rest tremor amplitude RUE 0    Rest tremor amplitude LUE 0    Rest tremor amplitude RLE 0    Rest tremor amplitude LLE 0    Lip/Jaw Tremor  0    Consistency of tremor 0    Finger Taps (Right)   1    Finger Taps (Left)  1    Hand Movement (Right)  1    Hand Movement (Left)   1    Pronation/Supination (Right)  0    Pronation/Supination (Left)   0    Toe Tapping (Right) 1    Toe Tapping (Left) 2    Leg Agility (Right)  1    Leg Agility (Left)   1     Rigidity - Neck  3     Rigidity - Upper Extremity (Right)  0      Rigidity - Upper Extremity (Left)   0     Rigidity - Lower Extremity (Right)  0    Rigidity - Lower Extremity (Left)   0    Arising from Chair   0      Gait   1     Freezing of Gait 0     Postural Stability  -     Posture 1     Global spontaneity of movement 1       -------------------------------------------------------------------------------------    Muscle Strength Right Left  Muscle Strength Right Left   Deltoid 5/5 5/5  Hip Adductors 5/5 5/5   Biceps 5/5 5/5  Hip Abductors 5/5 5/5   Triceps 5/5 5/5  Knee Extensors 5/5 5/5   Wrist Extensors 5/5 5/5  Knee Flexors 5/5 5/5   Wrist Flexors 5/5 5/5  Ankle Extensors 5/5 5/5    5/5 5/5  Ankle Flexors 5/5 5/5   Finger Abductors 5/5 5/5       Hip Flexors 5/5 5/5   Hip Extensors 5/5 5/5     Sensory  Intact to Light Touch, Temperature, and vibration sense in all extremities       Coordination:  Finger-to-nose-finger: normal     Gait:  Normal comprehensive gait evaluation, has normal raising, stance, gait, turns, Impaired Tandem gait     Reflexes:    Right Left   Biceps 2/4 2/4   Brachioradialis 1/4 1/4   Triceps 1/4 1/4   Knee 2/4 2/4   Ankle 1/4 1/4      Plantar cutaneous reflex:  Right: flexor  Left: flexor

## 2019-09-13 ENCOUNTER — CONSULT (OUTPATIENT)
Dept: NEUROLOGY | Facility: CLINIC | Age: 69
End: 2019-09-13
Payer: COMMERCIAL

## 2019-09-13 VITALS
BODY MASS INDEX: 34.6 KG/M2 | DIASTOLIC BLOOD PRESSURE: 72 MMHG | WEIGHT: 188 LBS | SYSTOLIC BLOOD PRESSURE: 128 MMHG | HEART RATE: 81 BPM | HEIGHT: 62 IN

## 2019-09-13 DIAGNOSIS — R26.81 UNSTEADY GAIT: ICD-10-CM

## 2019-09-13 DIAGNOSIS — R29.898 NECK TIGHTNESS: ICD-10-CM

## 2019-09-13 DIAGNOSIS — R25.1 TREMOR: ICD-10-CM

## 2019-09-13 DIAGNOSIS — Z87.898 HISTORY OF VERTIGO: ICD-10-CM

## 2019-09-13 DIAGNOSIS — G24.3 CERVICAL DYSTONIA: Primary | ICD-10-CM

## 2019-09-13 PROCEDURE — 99244 OFF/OP CNSLTJ NEW/EST MOD 40: CPT | Performed by: PSYCHIATRY & NEUROLOGY

## 2019-09-13 RX ORDER — CYCLOBENZAPRINE HCL 10 MG
10 TABLET ORAL 3 TIMES DAILY PRN
Qty: 90 TABLET | Refills: 0 | Status: SHIPPED | OUTPATIENT
Start: 2019-09-13 | End: 2019-12-07 | Stop reason: SDUPTHER

## 2019-09-13 NOTE — LETTER
September 13, 2019     Tatyana Cantu MD  [address] [place] [address]    Patient: Iraida Pérez   YOB: 1950   Date of Visit: 9/13/2019       Dear Dr Andreea Shane:    Thank you for referring Iraida Pérez to me for evaluation  Below are my notes for this consultation  If you have questions, please do not hesitate to call me  I look forward to following your patient along with you  Sincerely,        Dash Gómez MD        CC: MD Dash Dawn MD  9/13/2019  6:32 PM  Sign at close encounter  [facility]        NEW PATIENT EVALUATION NOTE    Patient: Iraida Pérez  Medical Record Number: # [id number]  YOB: 1950  Date of visit: 9/13/2019    Referring provider: Shawn Pillai MD    ASSESSMENT     Diagnoses for this encounter:  1  Cervical dystonia  cyclobenzaprine (FLEXERIL) 10 mg tablet    MRI brain w wo contrast   2  Tremor  Ambulatory referral to Neurology    MRI brain w wo contrast   3  Unsteady gait  Ambulatory referral to Neurology    MRI brain w wo contrast   4  Neck tightness  cyclobenzaprine (FLEXERIL) 10 mg tablet   5  History of vertigo  MRI brain w wo contrast     Impression of this 72 yo lady with chronic reported history of tightness of back of neck and spine, some mild gait imbalance without falls, very rare vertigo, and resting head > hand tremor  She did report a traumatic accident with her hitting her head ten years prior to start of the symptoms, which themselves occurred 10+ years ago  She has never before sought treatment for these until now  No associated family history  She has a high degree of anxiety at baseline and on Zoloft now  Her tremors are not currently bothersome to her or impairing her except as a curiosity  She denies any headache or neck pain  She has been to Physical Therapy before   Examination today shows high tension in the base of the neck near trapezius and shoulder muscles, with limited range of motion to head and neck and TWSTRS of 10  She does have a very fine amplitude, fast mixed head tremor more yes-yes  No positional dependence  No parkinsonism  She does not have resting tremor  Her Tandem gait is impaired but otherwise normal  Overall, she has potentially spasmodic torticollis accounting for her lateral shift and enhanced tone in neck - this might be contributing to the head tremor  She may have enhancement of tremor from her baseline anxiety  Her walking is more fear of falling than truly falling  We suggested that she consider using a cane for extra point of contact to ground and confidence  I did not find any signs concerning for a progressive neurological condition  Proceed as below  PLAN     · Reviewed normal CMP, CBC from July 2019  Previously normal magnesium, and polyclonal gammopathy on SPEP, normal TSH and elevated HbA1c at 6 6  · No recent or relevant neuroimaging results to review  · MRI Brain w/wo contrast  · She will continue the training techniques from PT at home for the balance as it was greatly helping her  · She will perform her neck stretching and will start on Flexeril 10mg tablet, but can do half tablet at a time up to three times a day as needed for neck tightness  · If neck continues to be tight and if symptoms are bothersome, will consider botulinum toxin injections at later date  · Thank you very much for sending me this interesting patient  · The patient has been instructed to call us about any new neurological problems or medication side effects  · Return to Clinic in 6 months    A total of 60 minutes were spent face-to-face with this patient, of which 25% was spent on counseling and coordination of care   We discussed the natural history of the patient's condition, differential diagnosis, level of diagnostic certainty, treatment alternatives and their side effects and possible complications  HISTORY OF PRESENT ILLNESS:     Ms Aguilar Jennings is a 71 y o  right handed female with Type II DM and hypergammaglobulinemia who has been referred to the [facility] for evaluation of tremors and unsteady gait  The patient was accompanied today  History was obtained from patient and granddaughter  Referred from PCP    Main bothersome neurological symptoms today are:   1  Tightness of the neck - this has been going on for more than 10 years  She has been seen by chiropractor before with manipulations  She denies ever trying any muscle relaxants  She did feel much better while doing the PT, and she has been doing less of it lately at home  2  Gait imbalance - chronic for 10+ years and she says when she wakes up during the night to go to the bathroom and her legs hurt when she walks around  She will initially walk slower and more hunched but improves over a short period of time  At times she feels herself nearly falling backwards and steps back to guard, never any true falls  No loss of consciousness  She denies any lightheadedness, but does she feels she needs to slowly adjust when she stands up  She states she drinks at least 32oz fluid a day  She denies freezing of gait  3  Tremors - chronic as well, mainly in her head, and also in her hands  She has a high level of anxiety and recently her Prozac was switched over to Zoloft due to worsened anxiety  The tremors are not currently affecting her daily life, and intermittent and variable  They think that the tremors have worsened over time  The dose has increased  Pt denies any resting tremor  4  Very sporadic vertigo - around four times in several years, random and sudden and without warning  This is relatively rare  Pt historically had complained about persistent dizziness to PCP, previously being treated for hypokalemia  She has hx of motion sickness and at least 2009   It was surmised she may have orthostatic hypotension and her HCTZ was lowered  By May 2019 her vertigo had resolve she says  In June 2019 she continue to have gait problems and evaluated by PT who noted hypertonicity of the cervical spine musculature and suggested Neurology evaluation  She completed PT for vestibular at end of July 2019  No hx of head injuries or surgeries  Current Relevant Medications: Zoloft 50mg qDay for anxiety  Living Situation + ADLs: Retired   Currently living at home with  usually, but sometimes visits her granddaughter  Independent in her ADLs  She has her 's license but does not drive since "years" voluntarily  She handles her own finances well per family  Family History: Number of First Degree Relatives With Parkinsonism/Dementia: none, and none with tremors       REVIEW OF PAST MEDICAL, SOCIAL AND FAMILY HISTORY:  This is the list of problems as per our Medical Records:    Patient Active Problem List    Diagnosis Date Noted    Anxiety 07/12/2019    Environmental allergies 06/04/2019    Unsteady gait 06/04/2019    Motion sickness 06/04/2019    Family history of colorectal cancer 06/04/2019    Calculus of gallbladder without cholecystitis without obstruction 11/16/2018    Abnormal SPEP 07/27/2018    Elevated blood protein 07/13/2018    Abnormal mammogram 05/17/2018    Screening for colon cancer 05/17/2018    Poor dentition 05/17/2018    Body mass index (BMI) of 33 0-33 9 in adult 05/17/2018    Need for shingles vaccine 05/17/2018    Skin lesion of cheek 11/10/2017    Tremor 11/10/2017    Abnormal mammogram of right breast 07/06/2017    Allergic rhinitis 04/11/2017    Dyslipidemia 07/29/2016    Hypokalemia 01/14/2016    Osteoarthritis of right knee 06/03/2015    DJD (degenerative joint disease) of knee 05/13/2015    Benign essential HTN 01/14/2015    Depression 01/14/2015    Type 2 diabetes mellitus without complication, without long-term current use of insulin ([facility] ) 01/14/2015       Past Medical History:   Diagnosis Date    Anxiety     Arthritis     B-complex deficiency     Depression     Diabetes mellitus ([facility] )     Glaucoma     Gout     Hypercalcemia     Hyperlipidemia     Last assessed: 8/5/15    Irregular heart beat     Memory loss     Palpitations     Suicidal ideation         Past Surgical History:   Procedure Laterality Date    TUBAL LIGATION      VAGINAL DELIVERY      x2        Allergies   Allergen Reactions    Lisinopril Cough    Other      Seasonal allergies and animal fur        Outpatient Encounter Medications as of 9/13/2019   Medication Sig Dispense Refill    albuterol (PROAIR HFA) 90 mcg/act inhaler Inhale 2 puffs as needed for wheezing or shortness of breath 1 Inhaler 3    amLODIPine (NORVASC) 10 mg tablet Take 1 tablet (10 mg total) by mouth daily 90 tablet 1    aspirin 81 MG tablet Take 1 tablet by mouth daily      atorvastatin (LIPITOR) 40 mg tablet TAKE 1 TABLET(40 MG) BY MOUTH DAILY 90 tablet 0    cetirizine (ZyrTEC) 10 mg tablet Take 1 tablet (10 mg total) by mouth daily 90 tablet 1    Cholecalciferol (VITAMIN D3) 2000 units capsule Take 1 capsule by mouth daily      fluticasone (FLONASE) 50 mcg/act nasal spray 1 spray into each nostril as needed        hydrochlorothiazide (HYDRODIURIL) 12 5 mg tablet Take 1 tablet (12 5 mg total) by mouth daily 90 tablet 1    losartan (COZAAR) 100 MG tablet Take 1 tablet (100 mg total) by mouth daily 90 tablet 1    meclizine (ANTIVERT) 25 mg tablet Take 1 tablet (25 mg total) by mouth every 12 (twelve) hours as needed for dizziness 30 tablet 1    potassium chloride (K-DUR,KLOR-CON) 10 mEq tablet Take 1 tablet (10 mEq total) by mouth daily 90 tablet 1    sertraline (ZOLOFT) 50 mg tablet Take 1 tablet (50 mg total) by mouth daily 90 tablet 2    cyclobenzaprine (FLEXERIL) 10 mg tablet Take 1 tablet (10 mg total) by mouth 3 (three) times a day as needed for muscle spasms Follow instructions for titration  90 tablet 0    Na Sulfate-K Sulfate-Mg Sulf (SUPREP BOWEL PREP KIT) 17 5-3 13-1 6 GM/177ML SOLN Take 177 mL by mouth once for 1 dose 2 Bottle 0    [DISCONTINUED] sertraline (ZOLOFT) 25 mg tablet Take 1 tablet (25 mg total) by mouth daily 30 tablet 5     No facility-administered encounter medications on file as of 9/13/2019  Social History     Tobacco Use    Smoking status: Never Smoker    Smokeless tobacco: Never Used   Substance Use Topics    Alcohol use: No        Family History   Problem Relation Age of Onset    Hypertension Mother         Benign Essential    Cancer Mother     Rectal cancer Mother    Idris Curtis Colon cancer Mother         unknown age of onset   Idris Curtis Hypertension Father         Benign Essential    Cancer Father     Diabetes Sister     Cirrhosis Daughter         Hepatic    Hepatitis Daughter         Type C Viral    Pancreatic cancer Cousin     No Known Problems Maternal Grandmother     No Known Problems Maternal Grandfather     No Known Problems Paternal Grandmother     No Known Problems Paternal Grandfather     No Known Problems Sister     No Known Problems Sister     No Known Problems Sister     No Known Problems Sister     No Known Problems Sister     No Known Problems Sister     Breast cancer Cousin         REVIEW OF SYSTEMS:  The patient has entered data on an intake form regarding present illness, past medical and surgical history, medications, allergies, family and social history, and a full review of 14 systems  I have reviewed this form with the patient, and all the relevant information has been included on this note  The full review of systems was negative except as stated in HPI and below  Constitutional: Negative  Negative for appetite change and fever  HENT: Negative  Negative for hearing loss, tinnitus, trouble swallowing and voice change  Eyes: Negative  Negative for photophobia and pain  Respiratory: Negative   Negative for shortness of breath  Cardiovascular: Negative  Negative for palpitations  Gastrointestinal: Negative  Negative for nausea and vomiting  Endocrine: Negative  Negative for cold intolerance and heat intolerance  Genitourinary: Negative for dysuria and urgency  Loss of bladder control   Musculoskeletal: Negative  Negative for myalgias and neck pain  Skin: Negative  Negative for rash  Neurological: Positive for tremors  Negative for dizziness, seizures, syncope, facial asymmetry, speech difficulty, weakness, light-headedness, numbness and headaches  Waking up at night   Memory Problem   Snoring   Clumsiness   Balance problem   Hematological: Negative  Does not bruise/bleed easily  Psychiatric/Behavioral: Negative for confusion, hallucinations and sleep disturbance  Anxiety     PHYSICAL EXAMINATION:     Vital signs:  /72 (BP Location: Right arm, Patient Position: Sitting, Cuff Size: Standard)   Pulse 81   Ht 5' 2" (1 575 m)   Wt 85 3 kg (188 lb)   BMI 34 39 kg/m²      General:  Well-appearing, well nourished, pleasant patient in no acute distress  Mood and Fund of Knowledge are appropriate  Head:  Normocephalic, atraumatic  Oropharynx and conjunctiva are clear  Speech  No hypophonia, no bradylalia  No scanning speech  Language: Comprehension intact  Neck:  Supple, strong 5/5 forward flexion and retroflexion  Extremities: Range of motion is normal       Cognitive and Mental Exam:  The patient is alert, oriented to self, location, date and situation  Memory is normal to provide accurate details of health history    Cranial Nerves:  CN II:  Direct and consensual light reflexes were equally reactive to light symmetrically  No afferent pupillary defect   Visual fields are full to confrontation  CN III / IV / VI: Extraocular movements were full, with normal pursuit and saccades  CN V:   Facial sensation to light touch was intact  CN VII: Face is symmetric with normal strength     CN VIII: Hearing was assessed using the Calibrated Finger Rub Auditory Screening Test (CALFRAST) and was not abnormal (Better than CALFRAST-Strong-70)  CN X:   Palate is up going bilaterally and symmetrically  CN XI:  Neck muscles are strong  CN XII: Tongue protrusion is at midline with normal movements  No dysarthria  Motor:    Dystonia: yes, R lateral shift in neck  She can turn head 45 degrees to the L, to 60degrees on the R, limited to 30-45 degrees backwards  Dyskinesia: none  Myoclonus: none  Chorea: none  Tics: none  TWSTRS  Severity scale: 10  Maximal excursion: rotation 2 ( 25° to  45), laterocollis  0 ( 0° to  0); Anterocollis or retrocollis  0 (none), +lateral shift 1; no saggital shift 0  Duration (x2 weight): 3 occasional 50-75%  Effects of sensory tricks: 0  No Shoulder elevation/ Anterior displacement  Range of Motion: 1, well past midline  Time (up to 60 sec): 0  )     Head has mild mixed predominantly yes-yes tremor  No torticollis        UPDRSIII                Time since last dose:    9/13/19      Speech  0     Facial Expression  0    Postural Tremor (Right) 0    Postural Tremor (Left) 0    Kinetic Tremor (Right)  0    Kinetic Tremor (Left)  0    Rest tremor amplitude RUE 0    Rest tremor amplitude LUE 0    Rest tremor amplitude RLE 0    Rest tremor amplitude LLE 0    Lip/Jaw Tremor  0    Consistency of tremor 0    Finger Taps (Right)   1    Finger Taps (Left)  1    Hand Movement (Right)  1    Hand Movement (Left)   1    Pronation/Supination (Right)  0    Pronation/Supination (Left)   0    Toe Tapping (Right) 1    Toe Tapping (Left) 2    Leg Agility (Right)  1    Leg Agility (Left)   1     Rigidity - Neck  3     Rigidity - Upper Extremity (Right)  0      Rigidity - Upper Extremity (Left)   0     Rigidity - Lower Extremity (Right)  0    Rigidity - Lower Extremity (Left)   0    Arising from Chair   0      Gait    1     Freezing of Gait 0     Postural Stability  -     Posture 1 Global spontaneity of movement 1       -------------------------------------------------------------------------------------    Muscle Strength Right Left  Muscle Strength Right Left   Deltoid 5/5 5/5  Hip Adductors 5/5 5/5   Biceps 5/5 5/5  Hip Abductors 5/5 5/5   Triceps 5/5 5/5  Knee Extensors 5/5 5/5   Wrist Extensors 5/5 5/5  Knee Flexors 5/5 5/5   Wrist Flexors 5/5 5/5  Ankle Extensors 5/5 5/5    5/5 5/5  Ankle Flexors 5/5 5/5   Finger Abductors 5/5 5/5       Hip Flexors 5/5 5/5   Hip Extensors 5/5 5/5     Sensory  Intact to Light Touch, Temperature, and vibration sense in all extremities       Coordination:  Finger-to-nose-finger: normal     Gait:  Normal comprehensive gait evaluation, has normal raising, stance, gait, turns, Impaired Tandem gait     Reflexes:    Right Left   Biceps 2/4 2/4   Brachioradialis 1/4 1/4   Triceps 1/4 1/4   Knee 2/4 2/4   Ankle 1/4 1/4      Plantar cutaneous reflex:  Right: flexor  Left: flexor

## 2019-09-13 NOTE — PROGRESS NOTES
Review of Systems   Constitutional: Negative  Negative for appetite change and fever  HENT: Negative  Negative for hearing loss, tinnitus, trouble swallowing and voice change  Eyes: Negative  Negative for photophobia and pain  Respiratory: Negative  Negative for shortness of breath  Cardiovascular: Negative  Negative for palpitations  Gastrointestinal: Negative  Negative for nausea and vomiting  Endocrine: Negative  Negative for cold intolerance and heat intolerance  Genitourinary: Negative for dysuria and urgency  Loss of bladder control     Musculoskeletal: Negative  Negative for myalgias and neck pain  Skin: Negative  Negative for rash  Neurological: Positive for tremors  Negative for dizziness, seizures, syncope, facial asymmetry, speech difficulty, weakness, light-headedness, numbness and headaches  Waking up at night  Memory Problem  Snoring  Clumsiness  Balance problem     Hematological: Negative  Does not bruise/bleed easily  Psychiatric/Behavioral: Negative for confusion, hallucinations and sleep disturbance          Anxiety

## 2019-09-13 NOTE — PATIENT INSTRUCTIONS
· Reviewed normal CMP, CBC from July 2019  Previously normal magnesium, and polyclonal gammopathy on SPEP, normal TSH and elevated HbA1c at 6 6  · No recent or relevant neuroimaging results to review  · MRI Brain w/wo contrast  · She will continue the training techniques from PT at home for the balance as it was greatly helping her  · She will perform her neck stretching and will start on Flexeril 10mg tablet, but can do half tablet at a time up to three times a day as needed for neck tightness  · If neck continues to be tight and if symptoms are bothersome, will consider botulinum toxin injections at later date  · Thank you very much for sending me this interesting patient  · The patient has been instructed to call us about any new neurological problems or medication side effects    · Return to Clinic in 6 months

## 2019-09-14 ENCOUNTER — LAB (OUTPATIENT)
Dept: LAB | Facility: HOSPITAL | Age: 69
End: 2019-09-14
Payer: COMMERCIAL

## 2019-09-14 DIAGNOSIS — E87.6 HYPOKALEMIA: ICD-10-CM

## 2019-09-14 DIAGNOSIS — E11.9 TYPE 2 DIABETES MELLITUS WITHOUT COMPLICATION, WITHOUT LONG-TERM CURRENT USE OF INSULIN (HCC): ICD-10-CM

## 2019-09-14 DIAGNOSIS — D89.2 HYPERGAMMAGLOBULINEMIA: ICD-10-CM

## 2019-09-14 LAB
ALBUMIN SERPL BCP-MCNC: 4.3 G/DL (ref 3.5–5)
ALP SERPL-CCNC: 91 U/L (ref 46–116)
ALT SERPL W P-5'-P-CCNC: 17 U/L (ref 12–78)
ANION GAP SERPL CALCULATED.3IONS-SCNC: 6 MMOL/L (ref 4–13)
AST SERPL W P-5'-P-CCNC: 19 U/L (ref 5–45)
BASOPHILS # BLD AUTO: 0.07 THOUSANDS/ΜL (ref 0–0.1)
BASOPHILS NFR BLD AUTO: 1 % (ref 0–1)
BILIRUB SERPL-MCNC: 0.85 MG/DL (ref 0.2–1)
BUN SERPL-MCNC: 21 MG/DL (ref 5–25)
CALCIUM SERPL-MCNC: 10 MG/DL (ref 8.3–10.1)
CHLORIDE SERPL-SCNC: 105 MMOL/L (ref 100–108)
CHOLEST SERPL-MCNC: 203 MG/DL (ref 50–200)
CO2 SERPL-SCNC: 29 MMOL/L (ref 21–32)
CREAT SERPL-MCNC: 1.14 MG/DL (ref 0.6–1.3)
CREAT UR-MCNC: 143 MG/DL
EOSINOPHIL # BLD AUTO: 0.31 THOUSAND/ΜL (ref 0–0.61)
EOSINOPHIL NFR BLD AUTO: 6 % (ref 0–6)
ERYTHROCYTE [DISTWIDTH] IN BLOOD BY AUTOMATED COUNT: 13.3 % (ref 11.6–15.1)
EST. AVERAGE GLUCOSE BLD GHB EST-MCNC: 126 MG/DL
GFR SERPL CREATININE-BSD FRML MDRD: 57 ML/MIN/1.73SQ M
GLUCOSE P FAST SERPL-MCNC: 101 MG/DL (ref 65–99)
HBA1C MFR BLD: 6 % (ref 4.2–6.3)
HCT VFR BLD AUTO: 37.9 % (ref 34.8–46.1)
HDLC SERPL-MCNC: 53 MG/DL (ref 40–60)
HGB BLD-MCNC: 12 G/DL (ref 11.5–15.4)
IGA SERPL-MCNC: 404 MG/DL (ref 70–400)
IGG SERPL-MCNC: 1890 MG/DL (ref 700–1600)
IGM SERPL-MCNC: 71 MG/DL (ref 40–230)
IMM GRANULOCYTES # BLD AUTO: 0.01 THOUSAND/UL (ref 0–0.2)
IMM GRANULOCYTES NFR BLD AUTO: 0 % (ref 0–2)
LDLC SERPL CALC-MCNC: 134 MG/DL (ref 0–100)
LYMPHOCYTES # BLD AUTO: 2.05 THOUSANDS/ΜL (ref 0.6–4.47)
LYMPHOCYTES NFR BLD AUTO: 40 % (ref 14–44)
MCH RBC QN AUTO: 29.1 PG (ref 26.8–34.3)
MCHC RBC AUTO-ENTMCNC: 31.7 G/DL (ref 31.4–37.4)
MCV RBC AUTO: 92 FL (ref 82–98)
MICROALBUMIN UR-MCNC: 18.9 MG/L (ref 0–20)
MICROALBUMIN/CREAT 24H UR: 13 MG/G CREATININE (ref 0–30)
MONOCYTES # BLD AUTO: 0.6 THOUSAND/ΜL (ref 0.17–1.22)
MONOCYTES NFR BLD AUTO: 12 % (ref 4–12)
NEUTROPHILS # BLD AUTO: 2.11 THOUSANDS/ΜL (ref 1.85–7.62)
NEUTS SEG NFR BLD AUTO: 41 % (ref 43–75)
NONHDLC SERPL-MCNC: 150 MG/DL
NRBC BLD AUTO-RTO: 0 /100 WBCS
PLATELET # BLD AUTO: 284 THOUSANDS/UL (ref 149–390)
PMV BLD AUTO: 9.5 FL (ref 8.9–12.7)
POTASSIUM SERPL-SCNC: 3.5 MMOL/L (ref 3.5–5.3)
PROT SERPL-MCNC: 8.5 G/DL (ref 6.4–8.2)
RBC # BLD AUTO: 4.13 MILLION/UL (ref 3.81–5.12)
SODIUM SERPL-SCNC: 140 MMOL/L (ref 136–145)
TRIGL SERPL-MCNC: 82 MG/DL
TSH SERPL DL<=0.05 MIU/L-ACNC: 0.44 UIU/ML (ref 0.36–3.74)
WBC # BLD AUTO: 5.15 THOUSAND/UL (ref 4.31–10.16)

## 2019-09-14 PROCEDURE — 36415 COLL VENOUS BLD VENIPUNCTURE: CPT | Performed by: PHYSICIAN ASSISTANT

## 2019-09-14 PROCEDURE — 82784 ASSAY IGA/IGD/IGG/IGM EACH: CPT

## 2019-09-14 PROCEDURE — 83036 HEMOGLOBIN GLYCOSYLATED A1C: CPT

## 2019-09-14 PROCEDURE — 82570 ASSAY OF URINE CREATININE: CPT

## 2019-09-14 PROCEDURE — 80061 LIPID PANEL: CPT

## 2019-09-14 PROCEDURE — 84443 ASSAY THYROID STIM HORMONE: CPT

## 2019-09-14 PROCEDURE — 82043 UR ALBUMIN QUANTITATIVE: CPT

## 2019-09-14 PROCEDURE — 85025 COMPLETE CBC W/AUTO DIFF WBC: CPT | Performed by: PHYSICIAN ASSISTANT

## 2019-09-14 PROCEDURE — 80053 COMPREHEN METABOLIC PANEL: CPT | Performed by: PHYSICIAN ASSISTANT

## 2019-10-16 DIAGNOSIS — E87.6 HYPOKALEMIA: ICD-10-CM

## 2019-10-16 DIAGNOSIS — I10 BENIGN ESSENTIAL HTN: ICD-10-CM

## 2019-10-16 DIAGNOSIS — I10 ESSENTIAL HYPERTENSION, BENIGN: ICD-10-CM

## 2019-10-17 RX ORDER — POTASSIUM CHLORIDE 750 MG/1
TABLET, EXTENDED RELEASE ORAL
Qty: 90 TABLET | Refills: 0 | OUTPATIENT
Start: 2019-10-17

## 2019-10-17 RX ORDER — HYDROCHLOROTHIAZIDE 12.5 MG/1
TABLET ORAL
Qty: 90 TABLET | Refills: 0 | OUTPATIENT
Start: 2019-10-17

## 2019-12-07 DIAGNOSIS — G24.3 CERVICAL DYSTONIA: ICD-10-CM

## 2019-12-07 DIAGNOSIS — R29.898 NECK TIGHTNESS: ICD-10-CM

## 2019-12-07 DIAGNOSIS — I10 ESSENTIAL HYPERTENSION, BENIGN: ICD-10-CM

## 2019-12-07 DIAGNOSIS — E87.6 HYPOKALEMIA: ICD-10-CM

## 2019-12-07 RX ORDER — CYCLOBENZAPRINE HCL 10 MG
TABLET ORAL
Qty: 90 TABLET | Refills: 3 | Status: SHIPPED | OUTPATIENT
Start: 2019-12-07 | End: 2020-03-13 | Stop reason: SDUPTHER

## 2019-12-09 RX ORDER — POTASSIUM CHLORIDE 20 MEQ/1
TABLET, EXTENDED RELEASE ORAL
Qty: 14 TABLET | Refills: 0 | OUTPATIENT
Start: 2019-12-09

## 2019-12-09 RX ORDER — AMLODIPINE BESYLATE 10 MG/1
TABLET ORAL
Qty: 90 TABLET | Refills: 0 | OUTPATIENT
Start: 2019-12-09

## 2019-12-27 DIAGNOSIS — I10 ESSENTIAL HYPERTENSION, BENIGN: ICD-10-CM

## 2019-12-27 PROCEDURE — 4010F ACE/ARB THERAPY RXD/TAKEN: CPT | Performed by: PHYSICIAN ASSISTANT

## 2019-12-27 RX ORDER — AMLODIPINE BESYLATE 10 MG/1
10 TABLET ORAL DAILY
Qty: 90 TABLET | Refills: 0 | Status: SHIPPED | OUTPATIENT
Start: 2019-12-27 | End: 2020-04-01 | Stop reason: SDUPTHER

## 2019-12-27 RX ORDER — LOSARTAN POTASSIUM 100 MG/1
100 TABLET ORAL DAILY
Qty: 90 TABLET | Refills: 1 | Status: CANCELLED | OUTPATIENT
Start: 2019-12-27

## 2019-12-27 RX ORDER — AMLODIPINE BESYLATE 10 MG/1
10 TABLET ORAL DAILY
Qty: 90 TABLET | Refills: 1 | Status: CANCELLED | OUTPATIENT
Start: 2019-12-27

## 2019-12-27 RX ORDER — LOSARTAN POTASSIUM 100 MG/1
100 TABLET ORAL DAILY
Qty: 90 TABLET | Refills: 0 | Status: SHIPPED | OUTPATIENT
Start: 2019-12-27 | End: 2020-04-01 | Stop reason: SDUPTHER

## 2019-12-27 NOTE — TELEPHONE ENCOUNTER
Last office visit - 9/10/19  No future visit scheduled    Prescription flagged "No further refill until seen in the office "

## 2019-12-27 NOTE — TELEPHONE ENCOUNTER
Pt needs refill for   amLODIPine (NORVASC) 10 mg tablet  losartan (COZAAR) 100 MG tablet  Sent to the   [id number], [address] [address], [address] [place] [address]

## 2020-01-09 ENCOUNTER — TELEPHONE (OUTPATIENT)
Dept: INTERNAL MEDICINE CLINIC | Facility: CLINIC | Age: 70
End: 2020-01-09

## 2020-01-09 DIAGNOSIS — I10 ESSENTIAL HYPERTENSION, BENIGN: ICD-10-CM

## 2020-01-09 RX ORDER — HYDROCHLOROTHIAZIDE 12.5 MG/1
12.5 TABLET ORAL DAILY
Qty: 90 TABLET | Refills: 0 | Status: SHIPPED | OUTPATIENT
Start: 2020-01-09 | End: 2020-04-01 | Stop reason: SDUPTHER

## 2020-01-09 RX ORDER — AMLODIPINE BESYLATE 10 MG/1
TABLET ORAL
Qty: 90 TABLET | Refills: 0 | OUTPATIENT
Start: 2020-01-09

## 2020-01-10 ENCOUNTER — TELEPHONE (OUTPATIENT)
Dept: GASTROENTEROLOGY | Facility: MEDICAL CENTER | Age: 70
End: 2020-01-10

## 2020-01-10 NOTE — TELEPHONE ENCOUNTER
Patient will call back and schedule with her granddaughter as she is the one that brings her to her appointments

## 2020-02-13 DIAGNOSIS — F41.9 ANXIETY: ICD-10-CM

## 2020-02-13 DIAGNOSIS — F34.1 DYSTHYMIA: ICD-10-CM

## 2020-02-13 RX ORDER — SERTRALINE HYDROCHLORIDE 25 MG/1
TABLET, FILM COATED ORAL
Qty: 30 TABLET | Refills: 5 | OUTPATIENT
Start: 2020-02-13

## 2020-02-17 ENCOUNTER — TELEPHONE (OUTPATIENT)
Dept: NEUROLOGY | Facility: CLINIC | Age: 70
End: 2020-02-17

## 2020-02-17 NOTE — TELEPHONE ENCOUNTER
Patient's granddaughter Warden Henriquez (on communication consent) calling to confirm upcoming appt with Dr Gil Suazo and if any testing is needed prior  Confirmed appt is on 3/13  MRI is needed beforehand and is already scheduled for 3/10  No further questions at this time

## 2020-02-26 NOTE — TELEPHONE ENCOUNTER
Pt called to let us know her insurance information    [employer] [address]   ID#: [id number]   Group#: [id number]

## 2020-03-10 ENCOUNTER — HOSPITAL ENCOUNTER (OUTPATIENT)
Dept: MRI IMAGING | Facility: HOSPITAL | Age: 70
Discharge: HOME | End: 2020-03-10
Attending: PSYCHIATRY & NEUROLOGY
Payer: COMMERCIAL

## 2020-03-10 DIAGNOSIS — Z87.898 HISTORY OF VERTIGO: ICD-10-CM

## 2020-03-10 DIAGNOSIS — R25.1 TREMOR: ICD-10-CM

## 2020-03-10 DIAGNOSIS — G24.3 CERVICAL DYSTONIA: ICD-10-CM

## 2020-03-10 DIAGNOSIS — R26.81 UNSTEADY GAIT: ICD-10-CM

## 2020-03-10 PROCEDURE — 70553 MRI BRAIN STEM W/O & W/DYE: CPT

## 2020-03-10 PROCEDURE — A9585 GADOBUTROL INJECTION: HCPCS | Performed by: PSYCHIATRY & NEUROLOGY

## 2020-03-10 RX ADMIN — GADOBUTROL 8 ML: 604.72 INJECTION INTRAVENOUS at 16:39

## 2020-03-11 NOTE — PROGRESS NOTES
[facility]        RETURN PATIENT NOTE    Patient: Shankar Lauren  Medical Record Number: # [id number]  YOB: 1950  Date of visit: 3/13/2020    Referring provider: Keira Hoskins MD    ASSESSMENT     Diagnoses for this encounter:  1  Cervical dystonia  cyclobenzaprine (FLEXERIL) 10 mg tablet   2  Neck tightness  cyclobenzaprine (FLEXERIL) 10 mg tablet   3  Unspecified abnormalities of gait and mobility     4  Vitreous floaters of both eyes       Impression of this 70 yo lady following up for tightness and discomfort in back her neck along with head and hand tremor and mild gait imbalance, returns today with near-resolution of the head tremor improvement of neck tightness after Physical Therapy and Flexeril  After PT was stopped she still had a slight hesitation in her step because of a vague feeling of "imbalance" but denies a false sense of movement  MRI brain was normal, without a clear structural cause for symptoms  Separately, she described dark spots with bright flashes of light in her vision and these do not occur with the imbalance  Her visual fields appear intact  As mentioned before, her walking issue is more-so a fear of falling than truly being at risk for falls and we did not see a clear ataxia on examination today  Meclizine has been helping somewhat for her  She did not use a cane today  All questions answered  PLAN     · She may inquire to her PCP regarding scopolamine patch as higher level of control than her current meclizine  It does appear she has more of a motion sickness  · She will continue the training techniques from PT at home for the balance as it was greatly helping her  She may benefit from visual therapy by PT at some point in future  Her current level of imbalance is very brief and inconsistent, not causing any falls     · We discussed that she may benefit from a formal eye examination for the spots, likely floaters and suspect retinal related process  There are no neurological sequelae and they do not occur when she has her dizziness  She had an Ophthalmology referral in system previously that   · She will perform her neck stretching, continuing on Flexeril 10mg tablet  We refilled this today  She no longer has significant head tremor  · In future if neck becomes tight again and if symptoms are bothersome, will consider indication for botulinum toxin injections  · The patient has been instructed to call us about any new neurological problems or medication side effects  · Return to Clinic p r n per her preference    A total of 40 minutes were spent face-to-face with this patient, of which 75% was spent on counseling and coordination of care  HISTORY OF PRESENT ILLNESS:     Ms Balbir Dash is a 71 y o  right handed female with Type II DM and hypergammaglobulinemia who has been referred to the [facility] [address] [place] [address] for suspected dystonic head tremors and unsteady gait  Last visit 19     The patient was accompanied today  History was obtained from patient and granddaughter  Interim History  No interim calls to office  Her MRI Brain completed 3/10/19, and was normal for age  She tells me that her neck pain and movements are no longer as bad, and her head shaking only comes out when she is upset or tired now  She has stopped physical therapy after the neck pain improved  She endorsed continued mild difficulty with balance but has not had vertigo for over 12 months per daughter  Specifically, she endorses a mild hesitation in her step when she is walking, that makes her take a step back, but no imbalance beyond that  She denies having an internal or external feeling of movement with this  She denies lightheadedness   She also endorses seeing some black spots in her vision without headache, nausea, vomiting, no trouble swallowing and unrelated temporally to her balance  She currently takes meclizine and has felt better on it, but continues to have problems while riding in a car  Most bothersome symptoms today are:   1  "Constant unsteadiness" - specifically of taking steps, sometimes she feels she has to take a step backward and then she feels ok afterwards  As above  No falls  2  The black spots and "white flash" in vision - not associated with other sequelae and not impairing her vision  No hx of migraines  INITIAL HISTORY  Main bothersome neurological symptoms today are:   1  Tightness of the neck - this has been going on for more than 10 years  She has been seen by chiropractor before with manipulations  She denies ever trying any muscle relaxants  She did feel much better while doing the PT, and she has been doing less of it lately at home  2  Gait imbalance - chronic for 10+ years and she says when she wakes up during the night to go to the bathroom and her legs hurt when she walks around  She will initially walk slower and more hunched but improves over a short period of time  At times she feels herself nearly falling backwards and steps back to guard, never any true falls  No loss of consciousness  She denies any lightheadedness, but does she feels she needs to slowly adjust when she stands up  She states she drinks at least 32oz fluid a day  She denies freezing of gait  3  Tremors - chronic as well, mainly in her head, and also in her hands  She has a high level of anxiety and recently her Prozac was switched over to Zoloft due to worsened anxiety  The tremors are not currently affecting her daily life, and intermittent and variable  They think that the tremors have worsened over time  The dose has increased  Pt denies any resting tremor  4  Very sporadic vertigo - around four times in several years, random and sudden and without warning  This is relatively rare   Pt historically had complained about persistent dizziness to PCP, previously being treated for hypokalemia  She has hx of motion sickness and at least 2009  It was surmised she may have orthostatic hypotension and her HCTZ was lowered  By May 2019 her vertigo had resolve she says  In June 2019 she continue to have gait problems and evaluated by PT who noted hypertonicity of the cervical spine musculature and suggested Neurology evaluation  She completed PT for vestibular at end of July 2019  No hx of head injuries or surgeries  Current Relevant Medications: Zoloft 50mg qDay for anxiety  Living Situation + ADLs: Retired   Currently living at home with  usually, but sometimes visits her granddaughter  Independent in her ADLs  She has her 's license but does not drive since "years" voluntarily  She handles her own finances well per family  Family History: Number of First Degree Relatives With Parkinsonism/Dementia: none, and none with tremors       REVIEW OF PAST MEDICAL, SOCIAL AND FAMILY HISTORY:  This is the list of problems as per our Medical Records:    Patient Active Problem List    Diagnosis Date Noted    Cervical dystonia 09/13/2019    Neck tightness 09/13/2019    History of vertigo 09/13/2019    Anxiety 07/12/2019    Environmental allergies 06/04/2019    Unsteady gait 06/04/2019    Motion sickness 06/04/2019    Family history of colorectal cancer 06/04/2019    Calculus of gallbladder without cholecystitis without obstruction 11/16/2018    Abnormal SPEP 07/27/2018    Elevated blood protein 07/13/2018    Abnormal mammogram 05/17/2018    Screening for colon cancer 05/17/2018    Poor dentition 05/17/2018    Body mass index (BMI) of 33 0-33 9 in adult 05/17/2018    Need for shingles vaccine 05/17/2018    Skin lesion of cheek 11/10/2017    Tremor 11/10/2017    Abnormal mammogram of right breast 07/06/2017    Allergic rhinitis 04/11/2017    Dyslipidemia 07/29/2016    Hypokalemia 01/14/2016    Osteoarthritis of right knee 06/03/2015    DJD (degenerative joint disease) of knee 05/13/2015    Benign essential HTN 01/14/2015    Depression 01/14/2015    Type 2 diabetes mellitus without complication, without long-term current use of insulin ([facility]) 01/14/2015     Allergies   Allergen Reactions    Lisinopril Cough    Other      Seasonal allergies and animal fur      Outpatient Encounter Medications as of 3/13/2020   Medication Sig Dispense Refill    albuterol (PROAIR HFA) 90 mcg/act inhaler Inhale 2 puffs as needed for wheezing or shortness of breath 1 Inhaler 3    amLODIPine (NORVASC) 10 mg tablet Take 1 tablet (10 mg total) by mouth daily No further refill until seen in the office  90 tablet 0    aspirin 81 MG tablet Take 1 tablet by mouth daily      atorvastatin (LIPITOR) 40 mg tablet TAKE 1 TABLET(40 MG) BY MOUTH DAILY 90 tablet 0    cetirizine (ZyrTEC) 10 mg tablet Take 1 tablet (10 mg total) by mouth daily 90 tablet 1    Cholecalciferol (VITAMIN D3) 2000 units capsule Take 1 capsule by mouth daily      cyclobenzaprine (FLEXERIL) 10 mg tablet 1 tablet daily as needed 90 tablet 3    fluticasone (FLONASE) 50 mcg/act nasal spray 1 spray into each nostril as needed        hydrochlorothiazide (HYDRODIURIL) 12 5 mg tablet Take 1 tablet (12 5 mg total) by mouth daily 90 tablet 0    losartan (COZAAR) 100 MG tablet Take 1 tablet (100 mg total) by mouth daily No further refill until seen in the office   90 tablet 0    meclizine (ANTIVERT) 25 mg tablet Take 1 tablet (25 mg total) by mouth every 12 (twelve) hours as needed for dizziness 30 tablet 1    sertraline (ZOLOFT) 25 mg tablet TK 1 T PO QD      [DISCONTINUED] cyclobenzaprine (FLEXERIL) 10 mg tablet TAKE 1 TABLET BY MOUTH THREE TIMES DAILY AS NEEDED MUSCLE SPASMS(FOLLOW INSTRUCTIONS FOR TITRATION) 90 tablet 3    Na Sulfate-K Sulfate-Mg Sulf (SUPREP BOWEL PREP KIT) 17 5-3 13-1 6 GM/177ML SOLN Take 177 mL by mouth once for 1 dose 2 Bottle 0    potassium chloride (K-DUR,KLOR-CON) 10 mEq tablet Take 1 tablet (10 mEq total) by mouth daily (Patient not taking: Reported on 3/13/2020) 90 tablet 1    sertraline (ZOLOFT) 50 mg tablet Take 1 tablet (50 mg total) by mouth daily (Patient not taking: Reported on 3/13/2020) 90 tablet 2     No facility-administered encounter medications on file as of 3/13/2020  Social History     Tobacco Use    Smoking status: Never Smoker    Smokeless tobacco: Never Used   Substance Use Topics    Alcohol use: No      Family History   Problem Relation Age of Onset    Hypertension Mother         Benign Essential    Cancer Mother     Rectal cancer Mother    [facility] [place] Colon cancer Mother         unknown age of onset   [place] Hypertension Father         Benign Essential    Cancer Father     Diabetes Sister     Cirrhosis Daughter         Hepatic    Hepatitis Daughter         Type C Viral    Pancreatic cancer Cousin     No Known Problems Maternal Grandmother     No Known Problems Maternal Grandfather     No Known Problems Paternal Grandmother     No Known Problems Paternal Grandfather     No Known Problems Sister     No Known Problems Sister     No Known Problems Sister     No Known Problems Sister     No Known Problems Sister     No Known Problems Sister     Breast cancer Cousin         REVIEW OF SYSTEMS:  The patient has entered data on an intake form regarding present illness, past medical and surgical history, medications, allergies, family and social history, and a full review of 14 systems  I have reviewed this form with the patient, and all the relevant information has been included on this note  The full review of systems was negative except as stated in HPI and below  Systems   Constitutional: Negative  HENT: Negative  Eyes: Negative  Respiratory: Negative  Cardiovascular: Negative  Gastrointestinal: Negative  Endocrine: Negative  Genitourinary: Negative  Musculoskeletal: Negative  Skin: Negative  Allergic/Immunologic: Negative  Neurological: Positive for dizziness and tremors  Hematological: Negative  Psychiatric/Behavioral: Negative  FOCUSED PHYSICAL EXAMINATION:     Vital signs:  /58 (BP Location: Right arm, Patient Position: Sitting, Cuff Size: Adult)   Pulse 94   Ht 5' 2" (1 575 m)   Wt 86 8 kg (191 lb 6 4 oz)   BMI 35 01 kg/m²     General:  Well-appearing, well nourished, pleasant patient in no acute distress  Mood appropriate  Head:  Normocephalic, atraumatic  Oropharynx and conjunctiva are clear  Speech  No hypophonia, no bradylalia  No scanning speech  Language: Comprehension intact  Neck:  Supple, strong 5/5 forward flexion and retroflexion  Extremities: Range of motion is normal       Cognitive and Mental Exam:  The patient is alert, oriented to self, location, date and situation  Cranial Nerves:  CN II:  Direct and consensual light reflexes were equally reactive to light symmetrically  No afferent pupillary defect  Visual fields are full to confrontation  CN III / IV / VI: Extraocular movements were full, with normal pursuit and saccades  No nystagmus  CN V:   Facial sensation to light touch was intact  CN VII: Face is symmetric with normal strength  CN VIII: Hearing was not assessed    CN X:   Palate is up going bilaterally and symmetrically  CN XI:  Neck muscles are strong  CN XII: Tongue protrusion is at midline with normal movements  No dysarthria  Motor:    Dystonia: yes, but mild R laterocollis  She can turn head 45 degrees to the L, to 60 degrees on the R, limited to 30-45 degrees backwards  Dyskinesia: none  Myoclonus: none  Chorea: none  Tics: none      No head tremor noticed today      [id number]                Time since last dose:   9/13/19   3/13/20   Speech  0  0   Facial Expression  0 0   Postural Tremor (Right) 0 0   Postural Tremor (Left) 0 0   Kinetic Tremor (Right)  0 0   Kinetic Tremor (Left)  0 0   Rest tremor amplitude RUE 0 0   Rest tremor amplitude LUE 0 0   Rest tremor amplitude RLE 0 0   Rest tremor amplitude LLE 0 0   Lip/Jaw Tremor  0 0   Consistency of tremor 0 0   Finger Taps (Right)   1 -   Finger Taps (Left)  1 -   Hand Movement (Right)  1 -   Hand Movement (Left)   1 -   Pronation/Supination (Right)  0 -   Pronation/Supination (Left)   0 -   Toe Tapping (Right) 1 -   Toe Tapping (Left) 2 -   Leg Agility (Right)  1 -   Leg Agility (Left)   1  -   Rigidity - Neck  3  -   Rigidity - Upper Extremity (Right)  0   -   Rigidity - Upper Extremity (Left)   0  -   Rigidity - Lower Extremity (Right)  0 -   Rigidity - Lower Extremity (Left)   0 -   Arising from Chair   0  0    Gait   1  0   Freezing of Gait 0  0   Postural Stability  -  -   Posture 1  0   Global spontaneity of movement 1  0     -------------------------------------------------------------------------------------    Muscle Strength Right Left  Muscle Strength Right Left   Deltoid 5/5 5/5  Hip Adductors 5/5 5/5   Biceps 5/5 5/5  Hip Abductors 5/5 5/5   Triceps 5/5 5/5  Knee Extensors 5/5 5/5   Wrist Extensors 5/5 5/5  Knee Flexors 5/5 5/5   Wrist Flexors 5/5 5/5  Ankle Extensors 5/5 5/5    5/5 5/5  Ankle Flexors 5/5 5/5   Finger Abductors 5/5 5/5       Hip Flexors 5/5 5/5   Hip Extensors 5/5 5/5      Gait:  She had a normal gait to the , no titubation or ataxia  She had narrow base, normal steppage height and stride length  Normal turns        Reflexes:    Right Left   Biceps 2/4 2/4   Brachioradialis 1/4 1/4   Triceps 1/4 1/4   Knee 2/4 2/4   Ankle 1/4 1/4

## 2020-03-13 ENCOUNTER — OFFICE VISIT (OUTPATIENT)
Dept: NEUROLOGY | Facility: CLINIC | Age: 70
End: 2020-03-13
Payer: COMMERCIAL

## 2020-03-13 VITALS
HEART RATE: 94 BPM | DIASTOLIC BLOOD PRESSURE: 58 MMHG | HEIGHT: 62 IN | BODY MASS INDEX: 35.22 KG/M2 | WEIGHT: 191.4 LBS | SYSTOLIC BLOOD PRESSURE: 117 MMHG

## 2020-03-13 DIAGNOSIS — H43.393 VITREOUS FLOATERS OF BOTH EYES: ICD-10-CM

## 2020-03-13 DIAGNOSIS — R26.9 UNSPECIFIED ABNORMALITIES OF GAIT AND MOBILITY: ICD-10-CM

## 2020-03-13 DIAGNOSIS — G24.3 CERVICAL DYSTONIA: Primary | ICD-10-CM

## 2020-03-13 DIAGNOSIS — R29.898 NECK TIGHTNESS: ICD-10-CM

## 2020-03-13 PROCEDURE — 1036F TOBACCO NON-USER: CPT | Performed by: PSYCHIATRY & NEUROLOGY

## 2020-03-13 PROCEDURE — 3078F DIAST BP <80 MM HG: CPT | Performed by: PSYCHIATRY & NEUROLOGY

## 2020-03-13 PROCEDURE — 4040F PNEUMOC VAC/ADMIN/RCVD: CPT | Performed by: PSYCHIATRY & NEUROLOGY

## 2020-03-13 PROCEDURE — 3074F SYST BP LT 130 MM HG: CPT | Performed by: PSYCHIATRY & NEUROLOGY

## 2020-03-13 PROCEDURE — 1160F RVW MEDS BY RX/DR IN RCRD: CPT | Performed by: PSYCHIATRY & NEUROLOGY

## 2020-03-13 PROCEDURE — 99215 OFFICE O/P EST HI 40 MIN: CPT | Performed by: PSYCHIATRY & NEUROLOGY

## 2020-03-13 PROCEDURE — 3008F BODY MASS INDEX DOCD: CPT | Performed by: PSYCHIATRY & NEUROLOGY

## 2020-03-13 RX ORDER — SERTRALINE HYDROCHLORIDE 25 MG/1
TABLET, FILM COATED ORAL
COMMUNITY
Start: 2020-01-23 | End: 2020-03-18

## 2020-03-13 RX ORDER — CYCLOBENZAPRINE HCL 10 MG
TABLET ORAL
Qty: 90 TABLET | Refills: 3 | Status: SHIPPED | OUTPATIENT
Start: 2020-03-13 | End: 2021-04-20

## 2020-03-13 NOTE — PROGRESS NOTES
Review of Systems   Constitutional: Negative  HENT: Negative  Eyes: Negative  Respiratory: Negative  Cardiovascular: Negative  Gastrointestinal: Negative  Endocrine: Negative  Genitourinary: Negative  Musculoskeletal: Negative  Skin: Negative  Allergic/Immunologic: Negative  Neurological: Positive for dizziness and tremors  Hematological: Negative  Psychiatric/Behavioral: Negative

## 2020-03-13 NOTE — LETTER
March 14, 2020     Carlo Woody MD  [address] [place] [address]    Patient: Oscar Mcdaniel   YOB: 1950   Date of Visit: 3/13/2020       Dear Dr Carnes Minor:    Earlier yesterday I saw Oscar Mcdaniel for follow-up of her tremors, gait and today spots in her visual field  Below are my notes for this visit for your records and to keep you updated on her health status  She is improving on the tremor and balance aspect, but I would suggest an Ophthalmology consult if she has not yet had one  If you have questions, please do not hesitate to call me  Sincerely,        Ramos Quintanilla MD        CC: No Recipients  Don Menchaca  3/13/2020  4:40 PM  Sign at close encounter  Review of Systems   Constitutional: Negative  HENT: Negative  Eyes: Negative  Respiratory: Negative  Cardiovascular: Negative  Gastrointestinal: Negative  Endocrine: Negative  Genitourinary: Negative  Musculoskeletal: Negative  Skin: Negative  Allergic/Immunologic: Negative  Neurological: Positive for dizziness and tremors  Hematological: Negative  Psychiatric/Behavioral: Negative  Ramos Quintanilla MD  3/14/2020 10:03 AM  Sign at close encounter  [address] [place] [address] PATIENT NOTE    Patient: Oscar Mcdaniel  Medical Record Number: # [id number]  YOB: 1950  Date of visit: 3/13/2020    Referring provider: Darren Ruiz MD    ASSESSMENT     Diagnoses for this encounter:  1  Cervical dystonia  cyclobenzaprine (FLEXERIL) 10 mg tablet   2  Neck tightness  cyclobenzaprine (FLEXERIL) 10 mg tablet   3  Unspecified abnormalities of gait and mobility     4   Vitreous floaters of both eyes       Impression of this 70 yo lady following up for tightness and discomfort in back her neck along with head and hand tremor and mild gait imbalance, returns today with near-resolution of the head tremor improvement of neck tightness after Physical Therapy and Flexeril  After PT was stopped she still had a slight hesitation in her step because of a vague feeling of "imbalance" but denies a false sense of movement  MRI brain was normal, without a clear structural cause for symptoms  Separately, she described dark spots with bright flashes of light in her vision and these do not occur with the imbalance  Her visual fields appear intact  As mentioned before, her walking issue is more-so a fear of falling than truly being at risk for falls and we did not see a clear ataxia on examination today  Meclizine has been helping somewhat for her  She did not use a cane today  All questions answered  PLAN     · She may inquire to her PCP regarding scopolamine patch as higher level of control than her current meclizine  It does appear she has more of a motion sickness  · She will continue the training techniques from PT at home for the balance as it was greatly helping her  She may benefit from visual therapy by PT at some point in future  Her current level of imbalance is very brief and inconsistent, not causing any falls  · We discussed that she may benefit from a formal eye examination for the spots, likely floaters and suspect retinal related process  There are no neurological sequelae and they do not occur when she has her dizziness  She had an Ophthalmology referral in system previously that   · She will perform her neck stretching, continuing on Flexeril 10mg tablet  We refilled this today  She no longer has significant head tremor  · In future if neck becomes tight again and if symptoms are bothersome, will consider indication for botulinum toxin injections  · The patient has been instructed to call us about any new neurological problems or medication side effects    · Return to Clinic p r n per her preference    A total of 40 minutes were spent face-to-face with this patient, of which 75% was spent on counseling and coordination of care  HISTORY OF PRESENT ILLNESS:     Ms Miranda Johnson is a 71 y o  right handed female with Type II DM and hypergammaglobulinemia who has been referred to the [facility] for suspected dystonic head tremors and unsteady gait  Last visit 9/13/19     The patient was accompanied today  History was obtained from patient and granddaughter  Interim History  No interim calls to office  Her MRI Brain completed 3/10/19, and was normal for age  She tells me that her neck pain and movements are no longer as bad, and her head shaking only comes out when she is upset or tired now  She has stopped physical therapy after the neck pain improved  She endorsed continued mild difficulty with balance but has not had vertigo for over 12 months per daughter  Specifically, she endorses a mild hesitation in her step when she is walking, that makes her take a step back, but no imbalance beyond that  She denies having an internal or external feeling of movement with this  She denies lightheadedness  She also endorses seeing some black spots in her vision without headache, nausea, vomiting, no trouble swallowing and unrelated temporally to her balance  She currently takes meclizine and has felt better on it, but continues to have problems while riding in a car  Most bothersome symptoms today are:   1  "Constant unsteadiness" - specifically of taking steps, sometimes she feels she has to take a step backward and then she feels ok afterwards  As above  No falls  2  The black spots and "white flash" in vision - not associated with other sequelae and not impairing her vision  No hx of migraines  INITIAL HISTORY  Main bothersome neurological symptoms today are:   1  Tightness of the neck - this has been going on for more than 10 years  She has been seen by chiropractor before with manipulations  She denies ever trying any muscle relaxants  She did feel much better while doing the PT, and she has been doing less of it lately at home  2  Gait imbalance - chronic for 10+ years and she says when she wakes up during the night to go to the bathroom and her legs hurt when she walks around  She will initially walk slower and more hunched but improves over a short period of time  At times she feels herself nearly falling backwards and steps back to guard, never any true falls  No loss of consciousness  She denies any lightheadedness, but does she feels she needs to slowly adjust when she stands up  She states she drinks at least 32oz fluid a day  She denies freezing of gait  3  Tremors - chronic as well, mainly in her head, and also in her hands  She has a high level of anxiety and recently her Prozac was switched over to Zoloft due to worsened anxiety  The tremors are not currently affecting her daily life, and intermittent and variable  They think that the tremors have worsened over time  The dose has increased  Pt denies any resting tremor  4  Very sporadic vertigo - around four times in several years, random and sudden and without warning  This is relatively rare  Pt historically had complained about persistent dizziness to PCP, previously being treated for hypokalemia  She has hx of motion sickness and at least 2009  It was surmised she may have orthostatic hypotension and her HCTZ was lowered  By May 2019 her vertigo had resolve she says  In June 2019 she continue to have gait problems and evaluated by PT who noted hypertonicity of the cervical spine musculature and suggested Neurology evaluation  She completed PT for vestibular at end of July 2019  No hx of head injuries or surgeries  Current Relevant Medications: Zoloft 50mg qDay for anxiety  Living Situation + ADLs: Retired   Currently living at home with  usually, but sometimes visits her granddaughter  Independent in her ADLs   She has her 's license but does not drive since "years" voluntarily  She handles her own finances well per family  Family History: Number of First Degree Relatives With Parkinsonism/Dementia: none, and none with tremors  REVIEW OF PAST MEDICAL, SOCIAL AND FAMILY HISTORY:  This is the list of problems as per our Medical Records:    Patient Active Problem List    Diagnosis Date Noted    Cervical dystonia 09/13/2019    Neck tightness 09/13/2019    History of vertigo 09/13/2019    Anxiety 07/12/2019    Environmental allergies 06/04/2019    Unsteady gait 06/04/2019    Motion sickness 06/04/2019    Family history of colorectal cancer 06/04/2019    Calculus of gallbladder without cholecystitis without obstruction 11/16/2018    Abnormal SPEP 07/27/2018    Elevated blood protein 07/13/2018    Abnormal mammogram 05/17/2018    Screening for colon cancer 05/17/2018    Poor dentition 05/17/2018    Body mass index (BMI) of 33 0-33 9 in adult 05/17/2018    Need for shingles vaccine 05/17/2018    Skin lesion of cheek 11/10/2017    Tremor 11/10/2017    Abnormal mammogram of right breast 07/06/2017    Allergic rhinitis 04/11/2017    Dyslipidemia 07/29/2016    Hypokalemia 01/14/2016    Osteoarthritis of right knee 06/03/2015    DJD (degenerative joint disease) of knee 05/13/2015    Benign essential HTN 01/14/2015    Depression 01/14/2015    Type 2 diabetes mellitus without complication, without long-term current use of insulin ([facility]) 01/14/2015     Allergies   Allergen Reactions    Lisinopril Cough    Other      Seasonal allergies and animal fur      Outpatient Encounter Medications as of 3/13/2020   Medication Sig Dispense Refill    albuterol (PROAIR HFA) 90 mcg/act inhaler Inhale 2 puffs as needed for wheezing or shortness of breath 1 Inhaler 3    amLODIPine (NORVASC) 10 mg tablet Take 1 tablet (10 mg total) by mouth daily No further refill until seen in the office   90 tablet 0    aspirin 81 MG tablet Take 1 tablet by mouth daily      atorvastatin (LIPITOR) 40 mg tablet TAKE 1 TABLET(40 MG) BY MOUTH DAILY 90 tablet 0    cetirizine (ZyrTEC) 10 mg tablet Take 1 tablet (10 mg total) by mouth daily 90 tablet 1    Cholecalciferol (VITAMIN D3) 2000 units capsule Take 1 capsule by mouth daily      cyclobenzaprine (FLEXERIL) 10 mg tablet 1 tablet daily as needed 90 tablet 3    fluticasone (FLONASE) 50 mcg/act nasal spray 1 spray into each nostril as needed        hydrochlorothiazide (HYDRODIURIL) 12 5 mg tablet Take 1 tablet (12 5 mg total) by mouth daily 90 tablet 0    losartan (COZAAR) 100 MG tablet Take 1 tablet (100 mg total) by mouth daily No further refill until seen in the office  90 tablet 0    meclizine (ANTIVERT) 25 mg tablet Take 1 tablet (25 mg total) by mouth every 12 (twelve) hours as needed for dizziness 30 tablet 1    sertraline (ZOLOFT) 25 mg tablet TK 1 T PO QD      [DISCONTINUED] cyclobenzaprine (FLEXERIL) 10 mg tablet TAKE 1 TABLET BY MOUTH THREE TIMES DAILY AS NEEDED MUSCLE SPASMS(FOLLOW INSTRUCTIONS FOR TITRATION) 90 tablet 3    Na Sulfate-K Sulfate-Mg Sulf (SUPREP BOWEL PREP KIT) 17 5-3 13-1 6 GM/177ML SOLN Take 177 mL by mouth once for 1 dose 2 Bottle 0    potassium chloride (K-DUR,KLOR-CON) 10 mEq tablet Take 1 tablet (10 mEq total) by mouth daily (Patient not taking: Reported on 3/13/2020) 90 tablet 1    sertraline (ZOLOFT) 50 mg tablet Take 1 tablet (50 mg total) by mouth daily (Patient not taking: Reported on 3/13/2020) 90 tablet 2     No facility-administered encounter medications on file as of 3/13/2020        Social History     Tobacco Use    Smoking status: Never Smoker    Smokeless tobacco: Never Used   Substance Use Topics    Alcohol use: No      Family History   Problem Relation Age of Onset    Hypertension Mother         Benign Essential    Cancer Mother     Rectal cancer Mother     Colon cancer Mother         unknown age of onset    Hypertension Father         Benign Essential    Cancer Father     Diabetes Sister     Cirrhosis Daughter         Hepatic    Hepatitis Daughter         Type C Viral    Pancreatic cancer Cousin     No Known Problems Maternal Grandmother     No Known Problems Maternal Grandfather     No Known Problems Paternal Grandmother     No Known Problems Paternal Grandfather     No Known Problems Sister     No Known Problems Sister     No Known Problems Sister     No Known Problems Sister     No Known Problems Sister     No Known Problems Sister     Breast cancer Cousin         REVIEW OF SYSTEMS:  The patient has entered data on an intake form regarding present illness, past medical and surgical history, medications, allergies, family and social history, and a full review of 14 systems  I have reviewed this form with the patient, and all the relevant information has been included on this note  The full review of systems was negative except as stated in HPI and below  Systems   Constitutional: Negative  HENT: Negative  Eyes: Negative  Respiratory: Negative  Cardiovascular: Negative  Gastrointestinal: Negative  Endocrine: Negative  Genitourinary: Negative  Musculoskeletal: Negative  Skin: Negative  Allergic/Immunologic: Negative  Neurological: Positive for dizziness and tremors  Hematological: Negative  Psychiatric/Behavioral: Negative  FOCUSED PHYSICAL EXAMINATION:     Vital signs:  /58 (BP Location: Right arm, Patient Position: Sitting, Cuff Size: Adult)   Pulse 94   Ht 5' 2" (1 575 m)   Wt 86 8 kg (191 lb 6 4 oz)   BMI 35 01 kg/m²      General:  Well-appearing, well nourished, pleasant patient in no acute distress  Mood appropriate  Head:  Normocephalic, atraumatic  Oropharynx and conjunctiva are clear  Speech  No hypophonia, no bradylalia  No scanning speech  Language: Comprehension intact  Neck:  Supple, strong 5/5 forward flexion and retroflexion     Extremities: Range of motion is normal  Cognitive and Mental Exam:  The patient is alert, oriented to self, location, date and situation  Cranial Nerves:  CN II:  Direct and consensual light reflexes were equally reactive to light symmetrically  No afferent pupillary defect  Visual fields are full to confrontation  CN III / IV / VI: Extraocular movements were full, with normal pursuit and saccades  No nystagmus  CN V:   Facial sensation to light touch was intact  CN VII: Face is symmetric with normal strength  CN VIII: Hearing was not assessed    CN X:   Palate is up going bilaterally and symmetrically  CN XI:  Neck muscles are strong  CN XII: Tongue protrusion is at midline with normal movements  No dysarthria  Motor:    Dystonia: yes, but mild R laterocollis  She can turn head 45 degrees to the L, to 60 degrees on the R, limited to 30-45 degrees backwards  Dyskinesia: none  Myoclonus: none  Chorea: none  Tics: none      No head tremor noticed today      UPDRSIII                Time since last dose:   9/13/19   3/13/20   Speech  0   0   Facial Expression  0 0   Postural Tremor (Right) 0 0   Postural Tremor (Left) 0 0   Kinetic Tremor (Right)  0 0   Kinetic Tremor (Left)  0 0   Rest tremor amplitude RUE 0 0   Rest tremor amplitude LUE 0 0   Rest tremor amplitude RLE 0 0   Rest tremor amplitude LLE 0 0   Lip/Jaw Tremor  0 0   Consistency of tremor 0 0   Finger Taps (Right)   1 -   Finger Taps (Left)  1 -   Hand Movement (Right)  1 -   Hand Movement (Left)   1 -   Pronation/Supination (Right)  0 -   Pronation/Supination (Left)   0 -   Toe Tapping (Right) 1 -   Toe Tapping (Left) 2 -   Leg Agility (Right)  1 -   Leg Agility (Left)   1   -   Rigidity - Neck  3   -   Rigidity - Upper Extremity (Right)  0    -   Rigidity - Upper Extremity (Left)   0   -   Rigidity - Lower Extremity (Right)  0 -   Rigidity - Lower Extremity (Left)   0 -   Arising from Chair   0   0    Gait   1   0   Freezing of Gait 0   0   Postural Stability  -   - Posture 1   0   Global spontaneity of movement 1   0     -------------------------------------------------------------------------------------    Muscle Strength Right Left  Muscle Strength Right Left   Deltoid 5/5 5/5  Hip Adductors 5/5 5/5   Biceps 5/5 5/5  Hip Abductors 5/5 5/5   Triceps 5/5 5/5  Knee Extensors 5/5 5/5   Wrist Extensors 5/5 5/5  Knee Flexors 5/5 5/5   Wrist Flexors 5/5 5/5  Ankle Extensors 5/5 5/5    5/5 5/5  Ankle Flexors 5/5 5/5   Finger Abductors 5/5 5/5       Hip Flexors 5/5 5/5   Hip Extensors 5/5 5/5      Gait:  She had a normal gait to the , no titubation or ataxia  She had narrow base, normal steppage height and stride length  Normal turns        Reflexes:    Right Left   Biceps 2/4 2/4   Brachioradialis 1/4 1/4   Triceps 1/4 1/4   Knee 2/4 2/4   Ankle 1/4 1/4

## 2020-03-14 DIAGNOSIS — Z91.09 ENVIRONMENTAL ALLERGIES: ICD-10-CM

## 2020-03-14 DIAGNOSIS — J30.81 ALLERGIC RHINITIS DUE TO ANIMAL HAIR AND DANDER: ICD-10-CM

## 2020-03-14 NOTE — PATIENT INSTRUCTIONS
· She may inquire to her PCP regarding scopolamine patch as higher level of control than her current meclizine  · She will continue the training techniques from PT at home for the balance as it was greatly helping her  She may benefit from visual therapy by PT again at some point in future  Her current level of imbalance is very brief and inconsistent, not causing any falls  · We discussed that she may benefit from a formal eye examination for the spots  There are no neurological sequelae and they do not occur when she has her dizziness  · She will perform her neck stretching, continuing on Flexeril 10mg tablet  We refilled this today  She no longer has significant head tremor  · In future neck continues to be tight and if symptoms are bothersome, will consider botulinum toxin injections at later date  · The patient has been instructed to call us about any new neurological problems or medication side effects    · Return to Clinic p r n per her preference

## 2020-03-16 RX ORDER — SERTRALINE HYDROCHLORIDE 25 MG/1
TABLET, FILM COATED ORAL
Qty: 30 TABLET | OUTPATIENT
Start: 2020-03-16

## 2020-03-16 RX ORDER — CETIRIZINE HYDROCHLORIDE 10 MG/1
TABLET ORAL
Qty: 30 TABLET | OUTPATIENT
Start: 2020-03-16

## 2020-03-18 ENCOUNTER — OFFICE VISIT (OUTPATIENT)
Dept: INTERNAL MEDICINE CLINIC | Facility: CLINIC | Age: 70
End: 2020-03-18
Payer: COMMERCIAL

## 2020-03-18 VITALS
HEART RATE: 89 BPM | OXYGEN SATURATION: 96 % | BODY MASS INDEX: 35 KG/M2 | TEMPERATURE: 98.7 F | SYSTOLIC BLOOD PRESSURE: 118 MMHG | HEIGHT: 62 IN | WEIGHT: 190.2 LBS | DIASTOLIC BLOOD PRESSURE: 70 MMHG

## 2020-03-18 DIAGNOSIS — T75.3XXA MOTION SICKNESS, INITIAL ENCOUNTER: ICD-10-CM

## 2020-03-18 DIAGNOSIS — Z23 NEEDS FLU SHOT: Primary | ICD-10-CM

## 2020-03-18 DIAGNOSIS — E78.5 DYSLIPIDEMIA: ICD-10-CM

## 2020-03-18 DIAGNOSIS — E87.6 HYPOKALEMIA: ICD-10-CM

## 2020-03-18 DIAGNOSIS — I10 BENIGN ESSENTIAL HTN: ICD-10-CM

## 2020-03-18 DIAGNOSIS — F34.1 DYSTHYMIA: ICD-10-CM

## 2020-03-18 DIAGNOSIS — R42 DIZZINESS: ICD-10-CM

## 2020-03-18 DIAGNOSIS — E11.9 TYPE 2 DIABETES MELLITUS WITHOUT COMPLICATION, WITHOUT LONG-TERM CURRENT USE OF INSULIN (HCC): ICD-10-CM

## 2020-03-18 DIAGNOSIS — J30.81 ALLERGIC RHINITIS DUE TO ANIMAL HAIR AND DANDER: ICD-10-CM

## 2020-03-18 DIAGNOSIS — T75.3XXS MOTION SICKNESS, SEQUELA: ICD-10-CM

## 2020-03-18 DIAGNOSIS — F41.9 ANXIETY: ICD-10-CM

## 2020-03-18 PROCEDURE — 1160F RVW MEDS BY RX/DR IN RCRD: CPT | Performed by: INTERNAL MEDICINE

## 2020-03-18 PROCEDURE — 99214 OFFICE O/P EST MOD 30 MIN: CPT | Performed by: INTERNAL MEDICINE

## 2020-03-18 PROCEDURE — 90471 IMMUNIZATION ADMIN: CPT

## 2020-03-18 PROCEDURE — 3074F SYST BP LT 130 MM HG: CPT | Performed by: INTERNAL MEDICINE

## 2020-03-18 PROCEDURE — 3008F BODY MASS INDEX DOCD: CPT | Performed by: INTERNAL MEDICINE

## 2020-03-18 PROCEDURE — 1036F TOBACCO NON-USER: CPT | Performed by: INTERNAL MEDICINE

## 2020-03-18 PROCEDURE — 3078F DIAST BP <80 MM HG: CPT | Performed by: INTERNAL MEDICINE

## 2020-03-18 PROCEDURE — 90662 IIV NO PRSV INCREASED AG IM: CPT

## 2020-03-18 PROCEDURE — 4040F PNEUMOC VAC/ADMIN/RCVD: CPT | Performed by: INTERNAL MEDICINE

## 2020-03-18 RX ORDER — MECLIZINE HYDROCHLORIDE 25 MG/1
25 TABLET ORAL EVERY 12 HOURS PRN
Qty: 90 TABLET | Refills: 0 | Status: CANCELLED | OUTPATIENT
Start: 2020-03-18

## 2020-03-18 RX ORDER — SCOLOPAMINE TRANSDERMAL SYSTEM 1 MG/1
1 PATCH, EXTENDED RELEASE TRANSDERMAL
Qty: 24 PATCH | Refills: 0 | Status: SHIPPED | OUTPATIENT
Start: 2020-03-18 | End: 2020-05-13 | Stop reason: SDUPTHER

## 2020-03-18 NOTE — PROGRESS NOTES
Assessment/Plan:    Type 2 diabetes mellitus without complication, without long-term current use of insulin ([facility])    Lab Results   Component Value Date    HGBA1C 6 0 09/14/2019   well controlled  Continue to monitor off diabetic medications  continue dietary modifications  Allergic rhinitis  Continue zyrtec  Benign essential HTN  Well controlled  Continue amlodipine 10 mg daily, losartan 100 mg daily, and HCTZ 12 5 mg daily  Depression  Continue Zoloft 50 mg daily  Well controlled  Dyslipidemia  Continue Lipitor 40 mg daily  Hypokalemia  Currently not on potassium supplementation  Will check CMP  Recommend increasing daily dietary potassium  Motion sickness  Discontinue meclizine as this can increase risk of falls  Will prescribe scopolamine patches  Diagnoses and all orders for this visit:    Needs flu shot  -     influenza vaccine, [id number], high-dose, PF 0 5 mL (FLUZONE HIGH-DOSE)    Dizziness  -     scopolamine (TRANSDERM-SCOP) 1 5 mg/3 days TD 72 hr patch; Place 1 patch on the skin every third day    Motion sickness, initial encounter    Type 2 diabetes mellitus without complication, without long-term current use of insulin ([facility])  -     Hemoglobin A1C; Future  -     Microalbumin / creatinine urine ratio; Future  -     Lipid panel; Future  -     CBC and differential; Future  -     Comprehensive metabolic panel; Future    Allergic rhinitis due to animal hair and dander    Benign essential HTN  -     CBC and differential; Future  -     Comprehensive metabolic panel; Future    Dysthymia    Dyslipidemia  -     Lipid panel; Future  -     CBC and differential; Future  -     Comprehensive metabolic panel; Future    Hypokalemia  -     Comprehensive metabolic panel; Future    Motion sickness, sequela  -     scopolamine (TRANSDERM-SCOP) 1 5 mg/3 days TD 72 hr patch; Place 1 patch on the skin every third day    Anxiety    Other orders  -     Cancel: meclizine (ANTIVERT) 25 mg tablet;  Take 1 tablet (25 mg total) by mouth every 12 (twelve) hours as needed for dizziness          BMI Counseling: Body mass index is 34 79 kg/m²  The BMI is above normal  Nutrition recommendations include decreasing portion sizes, encouraging healthy choices of fruits and vegetables, decreasing fast food intake, consuming healthier snacks, limiting drinks that contain sugar, moderation in carbohydrate intake, increasing intake of lean protein, reducing intake of saturated and trans fat and reducing intake of cholesterol  Exercise recommendations include moderate physical activity 150 minutes/week and exercising 3-5 times per week  No pharmacotherapy was ordered  Patient referred to PCP due to patient being overweight  Depression Screening and Follow-up Plan: Patient's depression screening was positive with a PHQ-2 score of 0  Their PHQ-9 score was 1  Patient advised to follow-up with PCP for further management  Falls Plan of Care: balance, strength, and gait training instructions were provided  Time spent during encounter: 25 minutes (counseling, reviewing medications, and discussing treatment and plan)    Subjective:      Patient ID: Lucila Vicente is a 71 y o  female  Chief Complaint   Patient presents with    Follow-up    Diabetes    Hypertension    Depression       17-year-old female is seen today for follow-up of chronic conditions  She is up-to-date on blood work a metabolic screening  She has been compliant with her medication regimen  She sees an ophthalmologist for diabetic retinopathy screening  She admits to having motion sickness  She is also experiencing dizziness when looking at objects that move fast or rotate such as the ceiling fan  Recent MRI was negative for acute abnormalities  She has been taking meclizine with improvement of symptoms    She is concern for memory issues however is active in her daily activities including using a computer/ipad as well as not having any issues with activities of daily living  Her granddaughter is present during examination and expresses no concerns  Hypertension   This is a chronic problem  The current episode started more than 1 year ago  The problem is unchanged  The problem is controlled  Pertinent negatives include no anxiety, blurred vision, chest pain, headaches, malaise/fatigue, neck pain, orthopnea, palpitations, peripheral edema, PND, shortness of breath or sweats  Past treatments include diuretics and calcium channel blockers  The current treatment provides moderate improvement  Compliance problems include exercise  Anxiety   Presents for follow-up visit  Patient reports no chest pain, compulsions, confusion, decreased concentration, depressed mood, dizziness, dry mouth, excessive worry, feeling of choking, hyperventilation, impotence, insomnia, irritability, malaise, muscle tension, nausea, nervous/anxious behavior, obsessions, palpitations, panic, restlessness, shortness of breath or suicidal ideas  The severity of symptoms is mild  The quality of sleep is fair  Compliance with medications is %  The following portions of the patient's history were reviewed and updated as appropriate: allergies, current medications, past family history, past medical history, past social history, past surgical history and problem list     Review of Systems   Constitutional: Negative for activity change, appetite change, chills, diaphoresis, fatigue, fever, irritability and malaise/fatigue  HENT: Negative for congestion, postnasal drip, rhinorrhea, sinus pressure, sinus pain, sneezing and sore throat  Eyes: Negative for blurred vision and visual disturbance  Respiratory: Negative for apnea, cough, choking, chest tightness, shortness of breath and wheezing  Cardiovascular: Negative for chest pain, palpitations, orthopnea, leg swelling and PND     Gastrointestinal: Negative for abdominal distention, abdominal pain, anal bleeding, blood in stool, constipation, diarrhea, nausea and vomiting  Endocrine: Negative for cold intolerance and heat intolerance  Genitourinary: Negative for difficulty urinating, dysuria, hematuria and impotence  Musculoskeletal: Negative  Negative for neck pain  Skin: Negative  Neurological: Negative for dizziness, weakness, light-headedness, numbness and headaches  Hematological: Negative for adenopathy  Psychiatric/Behavioral: Negative for agitation, confusion, decreased concentration, sleep disturbance and suicidal ideas  The patient is not nervous/anxious and does not have insomnia  All other systems reviewed and are negative  Past Medical History:   Diagnosis Date    Anxiety     Arthritis     B-complex deficiency     Depression     Diabetes mellitus ([facility] [address] )     Glaucoma     Gout     Hypercalcemia     Hyperlipidemia     Last assessed: 8/5/15    Irregular heart beat     Memory loss     Palpitations     Suicidal ideation          Current Outpatient Medications:     albuterol (PROAIR HFA) 90 mcg/act inhaler, Inhale 2 puffs as needed for wheezing or shortness of breath, Disp: 1 Inhaler, Rfl: 3    amLODIPine (NORVASC) 10 mg tablet, Take 1 tablet (10 mg total) by mouth daily No further refill until seen in the office  , Disp: 90 tablet, Rfl: 0    aspirin 81 MG tablet, Take 1 tablet by mouth daily, Disp: , Rfl:     atorvastatin (LIPITOR) 40 mg tablet, TAKE 1 TABLET(40 MG) BY MOUTH DAILY, Disp: 90 tablet, Rfl: 0    cetirizine (ZyrTEC) 10 mg tablet, Take 1 tablet (10 mg total) by mouth daily, Disp: 90 tablet, Rfl: 1    Cholecalciferol (VITAMIN D3) 2000 units capsule, Take 1 capsule by mouth daily, Disp: , Rfl:     cyclobenzaprine (FLEXERIL) 10 mg tablet, 1 tablet daily as needed, Disp: 90 tablet, Rfl: 3    fluticasone (FLONASE) 50 mcg/act nasal spray, 1 spray into each nostril as needed  , Disp: , Rfl:     hydrochlorothiazide (HYDRODIURIL) 12 5 mg tablet, Take 1 tablet (12 5 mg total) by mouth daily, Disp: 90 tablet, Rfl: 0    losartan (COZAAR) 100 MG tablet, Take 1 tablet (100 mg total) by mouth daily No further refill until seen in the office  , Disp: 90 tablet, Rfl: 0    sertraline (ZOLOFT) 50 mg tablet, Take 1 tablet (50 mg total) by mouth daily, Disp: 90 tablet, Rfl: 2    potassium chloride (K-DUR,KLOR-CON) 10 mEq tablet, Take 1 tablet (10 mEq total) by mouth daily (Patient not taking: Reported on 3/13/2020), Disp: 90 tablet, Rfl: 1    scopolamine (TRANSDERM-SCOP) 1 5 mg/3 days TD 72 hr patch, Place 1 patch on the skin every third day, Disp: 24 patch, Rfl: 0    Allergies   Allergen Reactions    Lisinopril Cough    Other      Seasonal allergies and animal fur       Social History   Past Surgical History:   Procedure Laterality Date    TUBAL LIGATION      VAGINAL DELIVERY      x2     Family History   Problem Relation Age of Onset    Hypertension Mother         Benign Essential    Cancer Mother     Rectal cancer Mother     Colon cancer Mother         unknown age of onset   [facility] Hypertension Father         Benign Essential    Cancer Father     Diabetes Sister     Cirrhosis Daughter         Hepatic    Hepatitis Daughter         Type C Viral    Pancreatic cancer Cousin     No Known Problems Maternal Grandmother     No Known Problems Maternal Grandfather     No Known Problems Paternal Grandmother     No Known Problems Paternal Grandfather     No Known Problems Sister     No Known Problems Sister     No Known Problems Sister     No Known Problems Sister     No Known Problems Sister     No Known Problems Sister     Breast cancer Cousin        Objective:  /70 (BP Location: Left arm, Patient Position: Sitting, Cuff Size: Large)   Pulse 89   Temp 98 7 °F (37 1 °C) (Oral)   Ht 5' 2" (1 575 m)   Wt 86 3 kg (190 lb 3 2 oz)   SpO2 96%   BMI 34 79 kg/m²     No results found for this or any previous visit (from the past 1344 hour(s))           Physical Exam Constitutional: She is oriented to person, place, and time  She appears well-developed and well-nourished  No distress  HENT:   Head: Normocephalic and atraumatic  Eyes: Pupils are equal, round, and reactive to light  Conjunctivae and EOM are normal  Right eye exhibits no discharge  Left eye exhibits no discharge  Neck: Normal range of motion  Neck supple  No JVD present  No thyromegaly present  Cardiovascular: Normal rate, regular rhythm, normal heart sounds and intact distal pulses  Exam reveals no gallop and no friction rub  No murmur heard  Pulmonary/Chest: Effort normal and breath sounds normal  No respiratory distress  She has no wheezes  She has no rales  She exhibits no tenderness  Abdominal: Soft  She exhibits no distension  There is no tenderness  Musculoskeletal: Normal range of motion  She exhibits no edema, tenderness or deformity  Lymphadenopathy:     She has no cervical adenopathy  Neurological: She is alert and oriented to person, place, and time  No cranial nerve deficit  Coordination normal    Skin: Skin is warm and dry  No rash noted  She is not diaphoretic  No erythema  No pallor  Psychiatric: She has a normal mood and affect  Her behavior is normal  Judgment and thought content normal    Nursing note and vitals reviewed

## 2020-03-18 NOTE — ASSESSMENT & PLAN NOTE
Currently not on potassium supplementation  Will check CMP  Recommend increasing daily dietary potassium

## 2020-03-18 NOTE — ASSESSMENT & PLAN NOTE
Lab Results   Component Value Date    HGBA1C 6 0 09/14/2019   well controlled  Continue to monitor off diabetic medications  continue dietary modifications  Continue to follow-up with Ophthalmology, Aneesh Camarillo

## 2020-04-01 DIAGNOSIS — I10 BENIGN ESSENTIAL HTN: ICD-10-CM

## 2020-04-01 DIAGNOSIS — Z91.09 ENVIRONMENTAL ALLERGIES: ICD-10-CM

## 2020-04-01 DIAGNOSIS — E87.6 HYPOKALEMIA: ICD-10-CM

## 2020-04-01 DIAGNOSIS — E78.5 DYSLIPIDEMIA: ICD-10-CM

## 2020-04-01 DIAGNOSIS — J30.81 ALLERGIC RHINITIS DUE TO ANIMAL HAIR AND DANDER: ICD-10-CM

## 2020-04-01 DIAGNOSIS — I10 ESSENTIAL HYPERTENSION, BENIGN: ICD-10-CM

## 2020-04-01 PROCEDURE — 4010F ACE/ARB THERAPY RXD/TAKEN: CPT | Performed by: INTERNAL MEDICINE

## 2020-04-01 RX ORDER — ATORVASTATIN CALCIUM 40 MG/1
40 TABLET, FILM COATED ORAL DAILY
Qty: 90 TABLET | Refills: 1 | Status: SHIPPED | OUTPATIENT
Start: 2020-04-01 | End: 2020-10-07 | Stop reason: SDUPTHER

## 2020-04-01 RX ORDER — FLUTICASONE PROPIONATE 50 MCG
1 SPRAY, SUSPENSION (ML) NASAL DAILY
Qty: 16 G | Refills: 1 | Status: SHIPPED | OUTPATIENT
Start: 2020-04-01 | End: 2021-01-04 | Stop reason: SDUPTHER

## 2020-04-01 RX ORDER — AMLODIPINE BESYLATE 10 MG/1
10 TABLET ORAL DAILY
Qty: 90 TABLET | Refills: 1 | Status: SHIPPED | OUTPATIENT
Start: 2020-04-01 | End: 2020-10-07 | Stop reason: SDUPTHER

## 2020-04-01 RX ORDER — HYDROCHLOROTHIAZIDE 12.5 MG/1
12.5 TABLET ORAL DAILY
Qty: 90 TABLET | Refills: 1 | Status: SHIPPED | OUTPATIENT
Start: 2020-04-01 | End: 2020-10-07 | Stop reason: SDUPTHER

## 2020-04-01 RX ORDER — LOSARTAN POTASSIUM 100 MG/1
100 TABLET ORAL DAILY
Qty: 90 TABLET | Refills: 1 | Status: SHIPPED | OUTPATIENT
Start: 2020-04-01 | End: 2020-10-07 | Stop reason: SDUPTHER

## 2020-04-01 RX ORDER — CETIRIZINE HYDROCHLORIDE 10 MG/1
10 TABLET ORAL DAILY
Qty: 90 TABLET | Refills: 1 | Status: SHIPPED | OUTPATIENT
Start: 2020-04-01 | End: 2020-10-07 | Stop reason: SDUPTHER

## 2020-04-01 RX ORDER — POTASSIUM CHLORIDE 750 MG/1
10 TABLET, EXTENDED RELEASE ORAL DAILY
Qty: 90 TABLET | Refills: 1 | Status: SHIPPED | OUTPATIENT
Start: 2020-04-01 | End: 2021-01-04 | Stop reason: SDUPTHER

## 2020-05-13 DIAGNOSIS — R42 DIZZINESS: ICD-10-CM

## 2020-05-13 DIAGNOSIS — T75.3XXS MOTION SICKNESS, SEQUELA: ICD-10-CM

## 2020-05-13 RX ORDER — SCOLOPAMINE TRANSDERMAL SYSTEM 1 MG/1
1 PATCH, EXTENDED RELEASE TRANSDERMAL
Qty: 24 PATCH | Refills: 0 | Status: SHIPPED | OUTPATIENT
Start: 2020-05-13 | End: 2021-04-20

## 2020-06-09 ENCOUNTER — TELEPHONE (OUTPATIENT)
Dept: HEMATOLOGY ONCOLOGY | Facility: CLINIC | Age: 70
End: 2020-06-09

## 2020-07-21 ENCOUNTER — TELEPHONE (OUTPATIENT)
Dept: INTERNAL MEDICINE CLINIC | Facility: CLINIC | Age: 70
End: 2020-07-21

## 2020-07-21 NOTE — TELEPHONE ENCOUNTER
LMOM to patient to call back the office      I need to follow up on a pending order for her DM eye exam

## 2020-07-22 ENCOUNTER — TELEPHONE (OUTPATIENT)
Dept: HEMATOLOGY ONCOLOGY | Facility: CLINIC | Age: 70
End: 2020-07-22

## 2020-07-22 NOTE — TELEPHONE ENCOUNTER
I called pt to screen her for her apt  Pt states she is out of town and cannot make the apt  She asked us to please call her granddaughter to reschedule her apt  Grandaughters name is Jordi Lj  She can be reached at [phone]  Message sent to MA's

## 2020-07-22 NOTE — TELEPHONE ENCOUNTER
Spoke to [place], patients granddaughter, to R/S patients appt  Her new appt is 9/24/20 at 3:00 pm at the [place] location with DEUCE Bryant  Patients granddaughter was fine with the appt

## 2020-09-16 ENCOUNTER — TELEPHONE (OUTPATIENT)
Dept: INTERNAL MEDICINE CLINIC | Facility: CLINIC | Age: 70
End: 2020-09-16

## 2020-09-16 NOTE — TELEPHONE ENCOUNTER
Patient will call us back to notified a fax# where we could send the blood orders  I spoke with grandchild and she will call back to schedule an appt in the office

## 2020-09-22 ENCOUNTER — TELEPHONE (OUTPATIENT)
Dept: HEMATOLOGY ONCOLOGY | Facility: CLINIC | Age: 70
End: 2020-09-22

## 2020-09-22 NOTE — TELEPHONE ENCOUNTER
Spoke to patient to go over screening for her appt with Jennifer Delong PA-C for this Thursday, 9/24  Patient states that her  is currently in the hospital after having a stroke  She will not be coming in for this appt  She will call back to reschedule

## 2020-09-29 DIAGNOSIS — F41.9 ANXIETY: ICD-10-CM

## 2020-09-29 DIAGNOSIS — F34.1 DYSTHYMIA: ICD-10-CM

## 2020-09-30 LAB
ALBUMIN SERPL-MCNC: 4.7 G/DL (ref 3.8–4.8)
ALBUMIN/CREAT UR: 43 MG/G CREAT (ref 0–29)
ALBUMIN/GLOB SERPL: 1.3 {RATIO} (ref 1.2–2.2)
ALP SERPL-CCNC: 101 IU/L (ref 39–117)
ALT SERPL-CCNC: 13 IU/L (ref 0–32)
AST SERPL-CCNC: 26 IU/L (ref 0–40)
BASOPHILS # BLD AUTO: 0.1 X10E3/UL (ref 0–0.2)
BASOPHILS NFR BLD AUTO: 1 %
BILIRUB SERPL-MCNC: 0.8 MG/DL (ref 0–1.2)
BUN SERPL-MCNC: 23 MG/DL (ref 8–27)
BUN/CREAT SERPL: 18 (ref 12–28)
CALCIUM SERPL-MCNC: 9.8 MG/DL (ref 8.7–10.3)
CHLORIDE SERPL-SCNC: 99 MMOL/L (ref 96–106)
CHOLEST SERPL-MCNC: 234 MG/DL (ref 100–199)
CO2 SERPL-SCNC: 22 MMOL/L (ref 20–29)
CREAT SERPL-MCNC: 1.31 MG/DL (ref 0.57–1)
CREAT UR-MCNC: 185.9 MG/DL
EOSINOPHIL # BLD AUTO: 0.2 X10E3/UL (ref 0–0.4)
EOSINOPHIL NFR BLD AUTO: 3 %
ERYTHROCYTE [DISTWIDTH] IN BLOOD BY AUTOMATED COUNT: 13.9 % (ref 11.7–15.4)
GLOBULIN SER-MCNC: 3.7 G/DL (ref 1.5–4.5)
GLUCOSE SERPL-MCNC: 119 MG/DL (ref 65–99)
HBA1C MFR BLD: 6.3 % (ref 4.8–5.6)
HCT VFR BLD AUTO: 35.7 % (ref 34–46.6)
HDLC SERPL-MCNC: 53 MG/DL
HGB BLD-MCNC: 12.2 G/DL (ref 11.1–15.9)
IMM GRANULOCYTES # BLD: 0 X10E3/UL (ref 0–0.1)
IMM GRANULOCYTES NFR BLD: 0 %
LDLC SERPL CALC-MCNC: 165 MG/DL (ref 0–99)
LYMPHOCYTES # BLD AUTO: 2 X10E3/UL (ref 0.7–3.1)
LYMPHOCYTES NFR BLD AUTO: 39 %
MCH RBC QN AUTO: 30 PG (ref 26.6–33)
MCHC RBC AUTO-ENTMCNC: 34.2 G/DL (ref 31.5–35.7)
MCV RBC AUTO: 88 FL (ref 79–97)
MICROALBUMIN UR-MCNC: 80 UG/ML
MONOCYTES # BLD AUTO: 0.6 X10E3/UL (ref 0.1–0.9)
MONOCYTES NFR BLD AUTO: 11 %
NEUTROPHILS # BLD AUTO: 2.3 X10E3/UL (ref 1.4–7)
NEUTROPHILS NFR BLD AUTO: 46 %
PLATELET # BLD AUTO: 329 X10E3/UL (ref 150–450)
POTASSIUM SERPL-SCNC: 3.7 MMOL/L (ref 3.5–5.2)
PROT SERPL-MCNC: 8.4 G/DL (ref 6–8.5)
RBC # BLD AUTO: 4.07 X10E6/UL (ref 3.77–5.28)
SL AMB EGFR AFRICAN AMERICAN: 48 ML/MIN/1.73
SL AMB EGFR NON AFRICAN AMERICAN: 41 ML/MIN/1.73
SL AMB VLDL CHOLESTEROL CALC: 16 MG/DL (ref 5–40)
SODIUM SERPL-SCNC: 139 MMOL/L (ref 134–144)
TRIGL SERPL-MCNC: 92 MG/DL (ref 0–149)
WBC # BLD AUTO: 5.1 X10E3/UL (ref 3.4–10.8)

## 2020-09-30 PROCEDURE — 3044F HG A1C LEVEL LT 7.0%: CPT | Performed by: INTERNAL MEDICINE

## 2020-09-30 PROCEDURE — 3060F POS MICROALBUMINURIA REV: CPT | Performed by: INTERNAL MEDICINE

## 2020-10-07 ENCOUNTER — TELEMEDICINE (OUTPATIENT)
Dept: INTERNAL MEDICINE CLINIC | Facility: CLINIC | Age: 70
End: 2020-10-07
Payer: COMMERCIAL

## 2020-10-07 ENCOUNTER — TELEPHONE (OUTPATIENT)
Dept: HEMATOLOGY ONCOLOGY | Facility: CLINIC | Age: 70
End: 2020-10-07

## 2020-10-07 VITALS — HEIGHT: 62 IN | WEIGHT: 178 LBS | BODY MASS INDEX: 32.76 KG/M2

## 2020-10-07 DIAGNOSIS — Z12.11 SCREENING FOR MALIGNANT NEOPLASM OF COLON: ICD-10-CM

## 2020-10-07 DIAGNOSIS — T75.3XXS MOTION SICKNESS, SEQUELA: ICD-10-CM

## 2020-10-07 DIAGNOSIS — R42 DIZZINESS: ICD-10-CM

## 2020-10-07 DIAGNOSIS — N17.9 AKI (ACUTE KIDNEY INJURY) (HCC): ICD-10-CM

## 2020-10-07 DIAGNOSIS — Z91.09 ENVIRONMENTAL ALLERGIES: ICD-10-CM

## 2020-10-07 DIAGNOSIS — J30.81 ALLERGIC RHINITIS DUE TO ANIMAL HAIR AND DANDER: ICD-10-CM

## 2020-10-07 DIAGNOSIS — I10 BENIGN ESSENTIAL HTN: ICD-10-CM

## 2020-10-07 DIAGNOSIS — F41.9 ANXIETY: ICD-10-CM

## 2020-10-07 DIAGNOSIS — E87.6 HYPOKALEMIA: ICD-10-CM

## 2020-10-07 DIAGNOSIS — R73.01 IMPAIRED FASTING BLOOD SUGAR: ICD-10-CM

## 2020-10-07 DIAGNOSIS — G24.3 CERVICAL DYSTONIA: ICD-10-CM

## 2020-10-07 DIAGNOSIS — I10 ESSENTIAL HYPERTENSION, BENIGN: ICD-10-CM

## 2020-10-07 DIAGNOSIS — E78.5 DYSLIPIDEMIA: ICD-10-CM

## 2020-10-07 DIAGNOSIS — F34.1 DYSTHYMIA: ICD-10-CM

## 2020-10-07 DIAGNOSIS — R29.898 NECK TIGHTNESS: ICD-10-CM

## 2020-10-07 DIAGNOSIS — R92.8 ABNORMAL MAMMOGRAM OF RIGHT BREAST: ICD-10-CM

## 2020-10-07 DIAGNOSIS — R26.81 UNSTEADY GAIT: ICD-10-CM

## 2020-10-07 DIAGNOSIS — Z12.31 ENCOUNTER FOR SCREENING MAMMOGRAM FOR MALIGNANT NEOPLASM OF BREAST: Primary | ICD-10-CM

## 2020-10-07 PROBLEM — R77.9 ELEVATED BLOOD PROTEIN: Status: RESOLVED | Noted: 2018-07-13 | Resolved: 2020-10-07

## 2020-10-07 PROBLEM — Z23 NEED FOR SHINGLES VACCINE: Status: RESOLVED | Noted: 2018-05-17 | Resolved: 2020-10-07

## 2020-10-07 PROCEDURE — 1160F RVW MEDS BY RX/DR IN RCRD: CPT | Performed by: INTERNAL MEDICINE

## 2020-10-07 PROCEDURE — 3288F FALL RISK ASSESSMENT DOCD: CPT | Performed by: INTERNAL MEDICINE

## 2020-10-07 PROCEDURE — 4010F ACE/ARB THERAPY RXD/TAKEN: CPT | Performed by: INTERNAL MEDICINE

## 2020-10-07 PROCEDURE — 1101F PT FALLS ASSESS-DOCD LE1/YR: CPT | Performed by: INTERNAL MEDICINE

## 2020-10-07 PROCEDURE — 3066F NEPHROPATHY DOC TX: CPT | Performed by: INTERNAL MEDICINE

## 2020-10-07 PROCEDURE — 1036F TOBACCO NON-USER: CPT | Performed by: INTERNAL MEDICINE

## 2020-10-07 PROCEDURE — 99214 OFFICE O/P EST MOD 30 MIN: CPT | Performed by: INTERNAL MEDICINE

## 2020-10-07 RX ORDER — HYDROCHLOROTHIAZIDE 12.5 MG/1
12.5 TABLET ORAL DAILY
Qty: 90 TABLET | Refills: 0 | Status: SHIPPED | OUTPATIENT
Start: 2020-10-07 | End: 2021-01-04 | Stop reason: SDUPTHER

## 2020-10-07 RX ORDER — CETIRIZINE HYDROCHLORIDE 10 MG/1
10 TABLET ORAL DAILY
Qty: 90 TABLET | Refills: 0 | Status: SHIPPED | OUTPATIENT
Start: 2020-10-07 | End: 2021-01-04 | Stop reason: SDUPTHER

## 2020-10-07 RX ORDER — AMLODIPINE BESYLATE 10 MG/1
10 TABLET ORAL DAILY
Qty: 90 TABLET | Refills: 0 | Status: SHIPPED | OUTPATIENT
Start: 2020-10-07 | End: 2021-01-04 | Stop reason: SDUPTHER

## 2020-10-07 RX ORDER — LOSARTAN POTASSIUM 100 MG/1
100 TABLET ORAL DAILY
Qty: 90 TABLET | Refills: 0 | Status: SHIPPED | OUTPATIENT
Start: 2020-10-07 | End: 2021-01-04 | Stop reason: SDUPTHER

## 2020-10-07 RX ORDER — ATORVASTATIN CALCIUM 40 MG/1
40 TABLET, FILM COATED ORAL DAILY
Qty: 90 TABLET | Refills: 0 | Status: SHIPPED | OUTPATIENT
Start: 2020-10-07 | End: 2021-01-04 | Stop reason: SDUPTHER

## 2020-10-11 DIAGNOSIS — E87.6 HYPOKALEMIA: ICD-10-CM

## 2020-10-11 DIAGNOSIS — I10 BENIGN ESSENTIAL HTN: ICD-10-CM

## 2020-10-13 RX ORDER — POTASSIUM CHLORIDE 750 MG/1
TABLET, EXTENDED RELEASE ORAL
Qty: 90 TABLET | Refills: 1 | OUTPATIENT
Start: 2020-10-13

## 2021-01-04 DIAGNOSIS — F41.9 ANXIETY: ICD-10-CM

## 2021-01-04 DIAGNOSIS — F34.1 DYSTHYMIA: ICD-10-CM

## 2021-01-04 DIAGNOSIS — I10 BENIGN ESSENTIAL HTN: ICD-10-CM

## 2021-01-04 DIAGNOSIS — Z91.09 ENVIRONMENTAL ALLERGIES: ICD-10-CM

## 2021-01-04 DIAGNOSIS — R29.898 NECK TIGHTNESS: ICD-10-CM

## 2021-01-04 DIAGNOSIS — E78.5 DYSLIPIDEMIA: ICD-10-CM

## 2021-01-04 DIAGNOSIS — G24.3 CERVICAL DYSTONIA: ICD-10-CM

## 2021-01-04 DIAGNOSIS — I10 ESSENTIAL HYPERTENSION, BENIGN: ICD-10-CM

## 2021-01-04 DIAGNOSIS — E87.6 HYPOKALEMIA: ICD-10-CM

## 2021-01-04 DIAGNOSIS — J30.81 ALLERGIC RHINITIS DUE TO ANIMAL HAIR AND DANDER: ICD-10-CM

## 2021-01-04 RX ORDER — POTASSIUM CHLORIDE 750 MG/1
10 TABLET, EXTENDED RELEASE ORAL DAILY
Qty: 90 TABLET | Refills: 1 | Status: SHIPPED | OUTPATIENT
Start: 2021-01-04 | End: 2021-04-09 | Stop reason: SDUPTHER

## 2021-01-04 RX ORDER — LOSARTAN POTASSIUM 100 MG/1
100 TABLET ORAL DAILY
Qty: 90 TABLET | Refills: 1 | Status: SHIPPED | OUTPATIENT
Start: 2021-01-04 | End: 2021-04-09 | Stop reason: SDUPTHER

## 2021-01-04 RX ORDER — HYDROCHLOROTHIAZIDE 12.5 MG/1
12.5 TABLET ORAL DAILY
Qty: 90 TABLET | Refills: 1 | Status: SHIPPED | OUTPATIENT
Start: 2021-01-04 | End: 2021-04-09 | Stop reason: SDUPTHER

## 2021-01-04 RX ORDER — CETIRIZINE HYDROCHLORIDE 10 MG/1
10 TABLET ORAL DAILY
Qty: 90 TABLET | Refills: 1 | Status: SHIPPED | OUTPATIENT
Start: 2021-01-04 | End: 2021-04-09 | Stop reason: SDUPTHER

## 2021-01-04 RX ORDER — FLUTICASONE PROPIONATE 50 MCG
1 SPRAY, SUSPENSION (ML) NASAL DAILY
Qty: 16 G | Refills: 1 | Status: SHIPPED | OUTPATIENT
Start: 2021-01-04 | End: 2021-07-22 | Stop reason: SDUPTHER

## 2021-01-04 RX ORDER — ALBUTEROL SULFATE 90 UG/1
2 AEROSOL, METERED RESPIRATORY (INHALATION) AS NEEDED
Qty: 1 INHALER | Refills: 3 | Status: SHIPPED | OUTPATIENT
Start: 2021-01-04

## 2021-01-04 RX ORDER — AMLODIPINE BESYLATE 10 MG/1
10 TABLET ORAL DAILY
Qty: 90 TABLET | Refills: 1 | Status: SHIPPED | OUTPATIENT
Start: 2021-01-04 | End: 2021-04-09 | Stop reason: SDUPTHER

## 2021-01-04 RX ORDER — ATORVASTATIN CALCIUM 40 MG/1
40 TABLET, FILM COATED ORAL DAILY
Qty: 90 TABLET | Refills: 1 | Status: SHIPPED | OUTPATIENT
Start: 2021-01-04 | End: 2021-04-09 | Stop reason: SDUPTHER

## 2021-01-22 ENCOUNTER — TELEPHONE (OUTPATIENT)
Dept: INTERNAL MEDICINE CLINIC | Facility: CLINIC | Age: 71
End: 2021-01-22

## 2021-03-16 ENCOUNTER — TELEPHONE (OUTPATIENT)
Dept: INTERNAL MEDICINE CLINIC | Facility: CLINIC | Age: 71
End: 2021-03-16

## 2021-03-16 NOTE — TELEPHONE ENCOUNTER
Spoke with patient and asked her to return the cologuard kit  Patient stated that she will get it done tomorrow 3/17/2021

## 2021-04-09 DIAGNOSIS — I10 ESSENTIAL HYPERTENSION, BENIGN: ICD-10-CM

## 2021-04-09 DIAGNOSIS — F41.9 ANXIETY: ICD-10-CM

## 2021-04-09 DIAGNOSIS — E87.6 HYPOKALEMIA: ICD-10-CM

## 2021-04-09 DIAGNOSIS — E78.5 DYSLIPIDEMIA: ICD-10-CM

## 2021-04-09 DIAGNOSIS — J30.81 ALLERGIC RHINITIS DUE TO ANIMAL HAIR AND DANDER: ICD-10-CM

## 2021-04-09 DIAGNOSIS — F34.1 DYSTHYMIA: ICD-10-CM

## 2021-04-09 DIAGNOSIS — I10 BENIGN ESSENTIAL HTN: ICD-10-CM

## 2021-04-09 DIAGNOSIS — Z91.09 ENVIRONMENTAL ALLERGIES: ICD-10-CM

## 2021-04-09 RX ORDER — LOSARTAN POTASSIUM 100 MG/1
100 TABLET ORAL DAILY
Qty: 30 TABLET | Refills: 0 | Status: SHIPPED | OUTPATIENT
Start: 2021-04-09 | End: 2021-04-20 | Stop reason: SDUPTHER

## 2021-04-09 RX ORDER — HYDROCHLOROTHIAZIDE 12.5 MG/1
12.5 TABLET ORAL DAILY
Qty: 30 TABLET | Refills: 0 | Status: SHIPPED | OUTPATIENT
Start: 2021-04-09 | End: 2021-04-20 | Stop reason: SDUPTHER

## 2021-04-09 RX ORDER — POTASSIUM CHLORIDE 750 MG/1
10 TABLET, EXTENDED RELEASE ORAL DAILY
Qty: 30 TABLET | Refills: 0 | Status: SHIPPED | OUTPATIENT
Start: 2021-04-09 | End: 2021-04-20 | Stop reason: SDUPTHER

## 2021-04-09 RX ORDER — AMLODIPINE BESYLATE 10 MG/1
10 TABLET ORAL DAILY
Qty: 30 TABLET | Refills: 0 | Status: SHIPPED | OUTPATIENT
Start: 2021-04-09 | End: 2021-04-20 | Stop reason: SDUPTHER

## 2021-04-09 RX ORDER — CETIRIZINE HYDROCHLORIDE 10 MG/1
10 TABLET ORAL DAILY
Qty: 30 TABLET | Refills: 0 | Status: SHIPPED | OUTPATIENT
Start: 2021-04-09 | End: 2021-07-22 | Stop reason: SDUPTHER

## 2021-04-09 RX ORDER — ATORVASTATIN CALCIUM 40 MG/1
40 TABLET, FILM COATED ORAL DAILY
Qty: 30 TABLET | Refills: 0 | Status: SHIPPED | OUTPATIENT
Start: 2021-04-09 | End: 2021-04-20 | Stop reason: SDUPTHER

## 2021-04-09 NOTE — TELEPHONE ENCOUNTER
Needs refill on the following medications sent to Marge at [address] [facility], [address] [facility]  She does still have refills at the [facility] but she is not longer living with her grandaughter so she would need those canceled and sent to the new pharmacy  She did take her last pills yesterday, so she would need these filled as soon as possible      Atorvastatin 40 mg  Hydrochlorothiazide 12 5 mg  Amlodipine 10 mg  Losartan 100 mg  Cetirizine 10 mg  Potassium chloride 10 mEq  Sertraline 50 mg

## 2021-04-09 NOTE — TELEPHONE ENCOUNTER
LOV: 10/7/20  NOV:  4/20/21      Just made the virtual appt told patient I would fill 1 month until her appt with Dr Lake Said

## 2021-04-12 RX ORDER — POTASSIUM CHLORIDE 750 MG/1
TABLET, EXTENDED RELEASE ORAL
Qty: 90 TABLET | OUTPATIENT
Start: 2021-04-12

## 2021-04-12 RX ORDER — AMLODIPINE BESYLATE 10 MG/1
TABLET ORAL
Qty: 90 TABLET | OUTPATIENT
Start: 2021-04-12

## 2021-04-12 RX ORDER — LOSARTAN POTASSIUM 100 MG/1
TABLET ORAL
Qty: 90 TABLET | OUTPATIENT
Start: 2021-04-12

## 2021-04-20 ENCOUNTER — TELEMEDICINE (OUTPATIENT)
Dept: INTERNAL MEDICINE CLINIC | Facility: CLINIC | Age: 71
End: 2021-04-20
Payer: MEDICARE

## 2021-04-20 DIAGNOSIS — Z12.12 SCREENING FOR COLORECTAL CANCER: ICD-10-CM

## 2021-04-20 DIAGNOSIS — Z13.1 SCREENING FOR DIABETES MELLITUS: ICD-10-CM

## 2021-04-20 DIAGNOSIS — Z78.0 ASYMPTOMATIC POSTMENOPAUSAL STATUS: ICD-10-CM

## 2021-04-20 DIAGNOSIS — Z13.6 SCREENING FOR CARDIOVASCULAR CONDITION: ICD-10-CM

## 2021-04-20 DIAGNOSIS — J30.81 ALLERGIC RHINITIS DUE TO ANIMAL HAIR AND DANDER: ICD-10-CM

## 2021-04-20 DIAGNOSIS — F34.1 DYSTHYMIA: ICD-10-CM

## 2021-04-20 DIAGNOSIS — Z00.00 MEDICARE ANNUAL WELLNESS VISIT, SUBSEQUENT: ICD-10-CM

## 2021-04-20 DIAGNOSIS — E87.6 HYPOKALEMIA: ICD-10-CM

## 2021-04-20 DIAGNOSIS — M17.11 PRIMARY OSTEOARTHRITIS OF RIGHT KNEE: ICD-10-CM

## 2021-04-20 DIAGNOSIS — I10 BENIGN ESSENTIAL HTN: ICD-10-CM

## 2021-04-20 DIAGNOSIS — R73.01 IMPAIRED FASTING BLOOD SUGAR: ICD-10-CM

## 2021-04-20 DIAGNOSIS — Z12.31 ENCOUNTER FOR SCREENING MAMMOGRAM FOR BREAST CANCER: ICD-10-CM

## 2021-04-20 DIAGNOSIS — I10 ESSENTIAL HYPERTENSION, BENIGN: Primary | ICD-10-CM

## 2021-04-20 DIAGNOSIS — E78.5 DYSLIPIDEMIA: ICD-10-CM

## 2021-04-20 DIAGNOSIS — F41.9 ANXIETY: ICD-10-CM

## 2021-04-20 DIAGNOSIS — Z12.11 SCREENING FOR COLORECTAL CANCER: ICD-10-CM

## 2021-04-20 PROBLEM — R26.81 UNSTEADY GAIT: Status: RESOLVED | Noted: 2019-06-04 | Resolved: 2021-04-20

## 2021-04-20 PROBLEM — R29.898 NECK TIGHTNESS: Status: RESOLVED | Noted: 2019-09-13 | Resolved: 2021-04-20

## 2021-04-20 PROBLEM — Z91.09 ENVIRONMENTAL ALLERGIES: Status: RESOLVED | Noted: 2019-06-04 | Resolved: 2021-04-20

## 2021-04-20 PROBLEM — T75.3XXA MOTION SICKNESS: Status: RESOLVED | Noted: 2019-06-04 | Resolved: 2021-04-20

## 2021-04-20 PROCEDURE — G0438 PPPS, INITIAL VISIT: HCPCS | Performed by: INTERNAL MEDICINE

## 2021-04-20 PROCEDURE — 1123F ACP DISCUSS/DSCN MKR DOCD: CPT | Performed by: INTERNAL MEDICINE

## 2021-04-20 PROCEDURE — 99214 OFFICE O/P EST MOD 30 MIN: CPT | Performed by: INTERNAL MEDICINE

## 2021-04-20 RX ORDER — POTASSIUM CHLORIDE 750 MG/1
10 TABLET, EXTENDED RELEASE ORAL DAILY
Qty: 90 TABLET | Refills: 0 | Status: SHIPPED | OUTPATIENT
Start: 2021-04-20 | End: 2021-07-22 | Stop reason: SDUPTHER

## 2021-04-20 RX ORDER — AMLODIPINE BESYLATE 10 MG/1
10 TABLET ORAL DAILY
Qty: 90 TABLET | Refills: 0 | Status: SHIPPED | OUTPATIENT
Start: 2021-04-20 | End: 2021-07-22 | Stop reason: SDUPTHER

## 2021-04-20 RX ORDER — HYDROCHLOROTHIAZIDE 12.5 MG/1
12.5 TABLET ORAL DAILY
Qty: 90 TABLET | Refills: 0 | Status: SHIPPED | OUTPATIENT
Start: 2021-04-20 | End: 2021-07-22 | Stop reason: SDUPTHER

## 2021-04-20 RX ORDER — ATORVASTATIN CALCIUM 40 MG/1
40 TABLET, FILM COATED ORAL DAILY
Qty: 90 TABLET | Refills: 0 | Status: SHIPPED | OUTPATIENT
Start: 2021-04-20 | End: 2021-07-22 | Stop reason: SDUPTHER

## 2021-04-20 RX ORDER — LOSARTAN POTASSIUM 100 MG/1
100 TABLET ORAL DAILY
Qty: 90 TABLET | Refills: 0 | Status: SHIPPED | OUTPATIENT
Start: 2021-04-20 | End: 2021-07-22 | Stop reason: SDUPTHER

## 2021-04-20 NOTE — PROGRESS NOTES
Assessment and Plan:     Problem List Items Addressed This Visit     None      Visit Diagnoses     Medicare annual wellness visit, subsequent    -  Primary    Asymptomatic postmenopausal status        Relevant Orders    DXA bone density spine hip and pelvis    Screening for diabetes mellitus        Screening for cardiovascular condition        Encounter for screening mammogram for breast cancer        Screening for colorectal cancer            BMI Counseling: There is no height or weight on file to calculate BMI  The BMI is above normal  Nutrition recommendations include decreasing portion sizes, encouraging healthy choices of fruits and vegetables, decreasing fast food intake, consuming healthier snacks, limiting drinks that contain sugar, moderation in carbohydrate intake, increasing intake of lean protein, reducing intake of saturated and trans fat and reducing intake of cholesterol  Exercise recommendations include moderate physical activity 150 minutes/week and exercising 3-5 times per week  No pharmacotherapy was ordered  Patient referred to PCP due to patient being overweight  Depression Screening and Follow-up Plan: Patient's depression screening was positive with a PHQ-2 score of 0  Their PHQ-9 score was 0  Clincally patient does not have depression  No treatment is required  Patient advised to follow-up with PCP for further management  Falls Plan of Care: balance, strength, and gait training instructions were provided  Medications that increase falls were reviewed  Vitamin D supplementation was recommended  Preventive health issues were discussed with patient, and age appropriate screening tests were ordered as noted in patient's After Visit Summary  Personalized health advice and appropriate referrals for health education or preventive services given if needed, as noted in patient's After Visit Summary       History of Present Illness:     Patient presents for [facility] Annual Wellness visit    Patient Care Team:  Rigoberto Ortega MD as PCP - General (Internal Medicine)  Taurus Etienne PA-C as PCP - Advanced Practitioner (Internal Medicine)  Taurus Etienne PA-C     Problem List:     Patient Active Problem List   Diagnosis    Abnormal mammogram of right breast    Allergic rhinitis    Benign essential HTN    Depression    DJD (degenerative joint disease) of knee    Dyslipidemia    Hypokalemia    Osteoarthritis of right knee    Skin lesion of cheek    Tremor    Impaired fasting blood sugar    Screening for colon cancer    Poor dentition    Body mass index (BMI) of 33 0-33 9 in adult    Abnormal SPEP    Calculus of gallbladder without cholecystitis without obstruction    Environmental allergies    Unsteady gait    Motion sickness    Family history of colorectal cancer    Anxiety    Cervical dystonia    Neck tightness    History of vertigo      Past Medical and Surgical History:     Past Medical History:   Diagnosis Date    Abnormal mammogram 5/17/2018    Anxiety     Arthritis     B-complex deficiency     Depression     Diabetes mellitus ([facility] [address] )     Elevated blood protein 7/13/2018    Glaucoma     Gout     Hypercalcemia     Hyperlipidemia     Last assessed: 8/5/15    Irregular heart beat     Memory loss     Need for shingles vaccine 5/17/2018    Palpitations     Suicidal ideation      Past Surgical History:   Procedure Laterality Date    TUBAL LIGATION      VAGINAL DELIVERY      x2      Family History:     Family History   Problem Relation Age of Onset    Hypertension Mother         Benign Essential    Cancer Mother     Rectal cancer Mother     Colon cancer Mother         unknown age of onset   [de-identified] Hypertension Father         Benign Essential    Cancer Father     Diabetes Sister     Cirrhosis Daughter         Hepatic    Hepatitis Daughter         Type C Viral    Pancreatic cancer Cousin     No Known Problems Maternal Grandmother     No Known Problems Maternal Grandfather     No Known Problems Paternal Grandmother     No Known Problems Paternal Grandfather     No Known Problems Sister     No Known Problems Sister     No Known Problems Sister     No Known Problems Sister     No Known Problems Sister     No Known Problems Sister     Breast cancer Cousin       Social History:     E-Cigarette/Vaping    E-Cigarette Use Never User      E-Cigarette/Vaping Substances    Nicotine No     THC No     CBD No     Flavoring No      Social History     Socioeconomic History    Marital status: /Civil Union     Spouse name: None    Number of children: None    Years of education: None    Highest education level: None   Occupational History    None   Social Needs    Financial resource strain: None    Food insecurity     Worry: None     Inability: None    Transportation needs     Medical: None     Non-medical: None   Tobacco Use    Smoking status: Never Smoker    Smokeless tobacco: Never Used   Substance and Sexual Activity    Alcohol use: No    Drug use: No    Sexual activity: Not Currently   Lifestyle    Physical activity     Days per week: None     Minutes per session: None    Stress: None   Relationships    Social connections     Talks on phone: None     Gets together: None     Attends Jewish service: None     Active member of club or organization: None     Attends meetings of clubs or organizations: None     Relationship status: None    Intimate partner violence     Fear of current or ex partner: None     Emotionally abused: None     Physically abused: None     Forced sexual activity: None   Other Topics Concern    None   Social History Narrative    Advance directive in chart      Medications and Allergies:     Current Outpatient Medications   Medication Sig Dispense Refill    albuterol (ProAir HFA) 90 mcg/act inhaler Inhale 2 puffs as needed for wheezing or shortness of breath 1 Inhaler 3    amLODIPine (NORVASC) 10 mg tablet Take 1 tablet (10 mg total) by mouth daily No further refill until seen in the office  30 tablet 0    aspirin 81 MG tablet Take 1 tablet by mouth daily      atorvastatin (LIPITOR) 40 mg tablet Take 1 tablet (40 mg total) by mouth daily 30 tablet 0    cetirizine (ZyrTEC) 10 mg tablet Take 1 tablet (10 mg total) by mouth daily 30 tablet 0    Cholecalciferol (VITAMIN D3) 2000 units capsule Take 1 capsule by mouth daily      cyclobenzaprine (FLEXERIL) 10 mg tablet 1 tablet daily as needed 90 tablet 3    fluticasone (FLONASE) 50 mcg/act nasal spray 1 spray into each nostril daily 16 g 1    hydrochlorothiazide (HYDRODIURIL) 12 5 mg tablet Take 1 tablet (12 5 mg total) by mouth daily 30 tablet 0    losartan (COZAAR) 100 MG tablet Take 1 tablet (100 mg total) by mouth daily No further refill until seen in the office  30 tablet 0    potassium chloride (K-DUR,KLOR-CON) 10 mEq tablet Take 1 tablet (10 mEq total) by mouth daily 30 tablet 0    sertraline (ZOLOFT) 50 mg tablet Take 1 tablet (50 mg total) by mouth daily 30 tablet 0    scopolamine (TRANSDERM-SCOP) 1 5 mg/3 days TD 72 hr patch Place 1 patch on the skin every third day (Patient not taking: Reported on 4/20/2021) 24 patch 0     No current facility-administered medications for this visit        Allergies   Allergen Reactions    Lisinopril Cough    Other      Seasonal allergies and animal fur      Immunizations:     Immunization History   Administered Date(s) Administered    INFLUENZA 11/19/2012, 10/31/2013, 01/14/2015, 03/09/2016, 03/15/2017, 11/10/2017    Influenza Quadrivalent Preservative Free 3 years and older IM 01/14/2015    Influenza Split 11/15/2012, 10/31/2013    Influenza Split High Dose Preservative Free IM 03/09/2016, 03/15/2017, 11/10/2017    Influenza, high dose seasonal 0 7 mL 12/21/2018, 03/18/2020    Pneumococcal Conjugate 13-Valent 07/29/2016    Pneumococcal Polysaccharide PPV23 11/15/2012, 07/12/2019      Health Maintenance:         Topic Date Due    Colorectal Cancer Screening  07/05/2019    MAMMOGRAM  07/18/2020    Hepatitis C Screening  Completed         Topic Date Due    COVID-19 Vaccine (1) Never done    DTaP,Tdap,and Td Vaccines (1 - Tdap) Never done    Influenza Vaccine (1) 09/01/2020      Medicare Health Risk Assessment:     There were no vitals taken for this visit  Nette Paredes is here for her Subsequent Wellness visit  Last Medicare Wellness visit information reviewed, patient interviewed and updates made to the record today  Health Risk Assessment:   Patient rates overall health as good  Patient feels that their physical health rating is same  Patient is satisfied with their life  Eyesight was rated as same  Hearing was rated as same  Patient feels that their emotional and mental health rating is same  Patients states they are never, rarely angry  Patient states they are never, rarely unusually tired/fatigued  Pain experienced in the last 7 days has been some  Patient's pain rating has been 4/10  Depression Screening:   PHQ-2 Score: 0  PHQ-9 Score: 0      Fall Risk Screening: In the past year, patient has experienced: no history of falling in past year      Urinary Incontinence Screening:   Patient has leaked urine accidently in the last six months  Home Safety:  Patient does not have trouble with stairs inside or outside of their home  Patient has working smoke alarms and has working carbon monoxide detector  Home safety hazards include: none  Nutrition:   Current diet is Regular  Medications:   Patient is currently taking over-the-counter supplements  OTC medications include: see medication list  Patient is able to manage medications       Activities of Daily Living (ADLs)/Instrumental Activities of Daily Living (IADLs):   Walk and transfer into and out of bed and chair?: Yes  Dress and groom yourself?: Yes    Bathe or shower yourself?: Yes    Do your laundry/housekeeping?: Yes  Manage your money, pay your bills and track your expenses?: Yes  Make your own meals?: Yes    Do your own shopping?: Yes    Previous Hospitalizations:   Any hospitalizations or ED visits within the last 12 months?: No      Advance Care Planning:   Living will: No    Advanced directive counseling given: Yes    End of Life Decisions reviewed with patient: Yes    Provider agrees with end of life decisions: Yes      Comments: Discussed advanced directives with patient: FULL CODE  Cognitive Screening:   Provider or family/friend/caregiver concerned regarding cognition?: No    PREVENTIVE SCREENINGS      Cardiovascular Screening:    General: Screening Current and Risks and Benefits Discussed      Diabetes Screening:     General: Screening Current and Risks and Benefits Discussed      Colorectal Cancer Screening:     General: Risks and Benefits Discussed      Breast Cancer Screening:     General: Screening Current and Risks and Benefits Discussed    Due for: Mammogram        Cervical Cancer Screening:    General: Screening Not Indicated and Risks and Benefits Discussed      Osteoporosis Screening:    General: Risks and Benefits Discussed    Due for: DXA Appendicular      Abdominal Aortic Aneurysm (AAA) Screening:        General: Screening Not Indicated      Lung Cancer Screening:     General: Screening Not Indicated      Hepatitis C Screening:    General: Screening Current    Screening, Brief Intervention, and Referral to Treatment (SBIRT)    Screening  Typical number of drinks in a day: 0    Single Item Drug Screening:  How often have you used an illegal drug (including marijuana) or a prescription medication for non-medical reasons in the past year? never    Single Item Drug Screen Score: 0  Interpretation: Negative screen for possible drug use disorder    Brief Intervention  Alcohol & drug use screenings were reviewed  No concerns regarding substance use disorder identified  Healthy alcohol use/limits discussed       Other Counseling Topics: Car/seat belt/driving safety, skin self-exam, sunscreen and calcium and vitamin D intake and regular weightbearing exercise  Darshan Anderson MD  Virtual AWV Consent    Reason for visit is AWV    Encounter provider Darshan Anderson MD    Provider located at [address] [place] [address] [address] [facility] [phone]      Recent Visits  No visits were found meeting these conditions  Showing recent visits within past 7 days and meeting all other requirements     Today's Visits  Date Type Provider Dept   04/20/21 Telemedicine Darshan Anderson MD [facility]   Showing today's visits and meeting all other requirements     Future Appointments  No visits were found meeting these conditions  Showing future appointments within next 150 days and meeting all other requirements        After connecting through [employer], the patient was identified by name and date of birth  Tony Bills was informed that this is a telemedicine visit and that the visit is being conducted through [employer] and patient was informed that this is not a secure, HIPAA-compliant platform  She agrees to proceed  My office door was closed  No one else was in the room  She acknowledged consent and understanding of privacy and security of the video platform  The patient has agreed to participate and understands they can discontinue the visit at any time    Patient is aware this is a billable service  Virtual AWV Consent    Reason for visit is  AWV    Encounter provider Darshan Anderson MD    Provider located at [address] [place] [address] [address] [facility] [phone]      Recent Visits  No visits were found meeting these conditions     Showing recent visits within past 7 days and meeting all other requirements     Today's Visits  Date Type Provider Dept 04/20/21 Lou Singer MD [facility]   Showing today's visits and meeting all other requirements     Future Appointments  No visits were found meeting these conditions  Showing future appointments within next 150 days and meeting all other requirements        After connecting through televideo, the patient was identified by name and date of birth  Dalton So was informed that this is a telemedicine visit and that the visit is being conducted through [employer] [place] and patient was informed that this is not a secure, HIPAA-compliant platform  She agrees to proceed  My office door was closed  No one else was in the room  She acknowledged consent and understanding of privacy and security of the video platform  The patient has agreed to participate and understands they can discontinue the visit at any time    Patient is aware this is a billable service

## 2021-04-20 NOTE — PROGRESS NOTES
Virtual Regular Visit      Assessment/Plan:    Problem List Items Addressed This Visit        Endocrine    Impaired fasting blood sugar      Discussed lifestyle modification with diet, exercise, weight loss  Will check A1c  Relevant Orders    CBC and differential    Comprehensive metabolic panel    Hemoglobin A1C    Lipid panel       Respiratory    Allergic rhinitis       Controlled  Continue Claritin and Flonase  Cardiovascular and Mediastinum    Benign essential HTN       Continue losartan 100 mg daily, hydrochlorothiazide 12 5 mg daily, and amlodipine 10 mg daily  Relevant Medications    amLODIPine (NORVASC) 10 mg tablet    hydrochlorothiazide (HYDRODIURIL) 12 5 mg tablet    losartan (COZAAR) 100 MG tablet    potassium chloride (K-DUR,KLOR-CON) 10 mEq tablet    Other Relevant Orders    CBC and differential    Comprehensive metabolic panel    Hemoglobin A1C    Lipid panel       Musculoskeletal and Integument    Osteoarthritis of right knee       Discussed performing range of motion and stretching exercises  Continue Tylenol and CBD as needed for pain  Other    Depression      Stable  Continue Zoloft 50 mg daily  Relevant Medications    sertraline (ZOLOFT) 50 mg tablet    Dyslipidemia       Continue atorvastatin 40 mg daily  Follow-up lipid panel  Relevant Medications    atorvastatin (LIPITOR) 40 mg tablet    Other Relevant Orders    CBC and differential    Comprehensive metabolic panel    Hemoglobin A1C    Lipid panel    Hypokalemia      Continue potassium replacement therapy  Follow-up CMP           Relevant Medications    potassium chloride (K-DUR,KLOR-CON) 10 mEq tablet    Other Relevant Orders    Comprehensive metabolic panel    Body mass index (BMI) of 33 0-33 9 in adult    Anxiety    Relevant Medications    sertraline (ZOLOFT) 50 mg tablet      Other Visit Diagnoses     Essential hypertension, benign    -  Primary    Relevant Medications amLODIPine (NORVASC) 10 mg tablet    hydrochlorothiazide (HYDRODIURIL) 12 5 mg tablet    losartan (COZAAR) 100 MG tablet    Medicare annual wellness visit, subsequent        Asymptomatic postmenopausal status        Relevant Orders    DXA bone density spine hip and pelvis    Screening for diabetes mellitus        Screening for cardiovascular condition        Encounter for screening mammogram for breast cancer        Screening for colorectal cancer                   Reason for visit is   Chief Complaint   Patient presents with    Follow-up     Patient is here for f/u HTN  Patient did not have blood work done  Pt does not have any new medical concerns  Pt was unable to provide vitals from home  Pt statted that the patch scopolamine is very strong and she would like to go back to the little pill (patch makes her feel high)    Virtual Awv    Virtual Awv    Virtual Regular Visit        Encounter provider Thalia Alcala MD    Provider located at [address] [place] [id number]      Recent Visits  No visits were found meeting these conditions  Showing recent visits within past 7 days and meeting all other requirements     Today's Visits  Date Type Provider Dept   04/20/21 Telemedicine Thalia Alcala MD [facility]   Showing today's visits and meeting all other requirements     Future Appointments  No visits were found meeting these conditions  Showing future appointments within next 150 days and meeting all other requirements        The patient was identified by name and date of birth  Mariela Amen was informed that this is a telemedicine visit and that the visit is being conducted through [address] [place] [address] and patient was informed that this is not a secure, HIPAA-compliant platform  She agrees to proceed     My office door was closed  No one else was in the room    She acknowledged consent and understanding of privacy and security of the video platform  The patient has agreed to participate and understands they can discontinue the visit at any time  Patient is aware this is a billable service  Subjective  Milka Escobar is a   77-year-old female is seen today for follow-up of chronic conditions  No laboratory studies to review today  She is overdue for lab results  She has been compliant with her medication regimen  She has been continuing to experiencing grief since the passing of her  which occurred within the past year  She has been compliant with Zoloft 50 mg daily to which he reports is helping with the grieving process  Otherwise, she has no active complaints or concerns at this time  Hypertension  This is a chronic problem  The problem is unchanged  The problem is controlled  Associated symptoms include anxiety  Pertinent negatives include no chest pain, headaches, palpitations or shortness of breath  Risk factors for coronary artery disease include dyslipidemia  Past treatments include calcium channel blockers, angiotensin blockers and diuretics  The current treatment provides moderate improvement  Compliance problems include exercise  Hyperlipidemia  This is a chronic problem  The problem is controlled  Pertinent negatives include no chest pain or shortness of breath  Current antihyperlipidemic treatment includes statins  The current treatment provides moderate improvement of lipids  Compliance problems include adherence to exercise  Anxiety  Presents for follow-up visit  Symptoms include depressed mood  Patient reports no chest pain, dizziness, nausea, palpitations, shortness of breath or suicidal ideas  Symptoms occur occasionally  The severity of symptoms is mild  The patient sleeps 8 hours per night  The quality of sleep is fair  Compliance with medications is %          Past Medical History:   Diagnosis Date    Abnormal mammogram 5/17/2018    Anxiety     Arthritis     B-complex deficiency     Depression     Diabetes mellitus ([facility] [address] )     Elevated blood protein 7/13/2018    Glaucoma     Gout     Hypercalcemia     Hyperlipidemia     Last assessed: 8/5/15    Irregular heart beat     Memory loss     Need for shingles vaccine 5/17/2018    Palpitations     Suicidal ideation        Past Surgical History:   Procedure Laterality Date    TUBAL LIGATION      VAGINAL DELIVERY      x2       Current Outpatient Medications   Medication Sig Dispense Refill    albuterol (ProAir HFA) 90 mcg/act inhaler Inhale 2 puffs as needed for wheezing or shortness of breath 1 Inhaler 3    amLODIPine (NORVASC) 10 mg tablet Take 1 tablet (10 mg total) by mouth daily No further refill until seen in the office  90 tablet 0    aspirin 81 MG tablet Take 1 tablet by mouth daily      atorvastatin (LIPITOR) 40 mg tablet Take 1 tablet (40 mg total) by mouth daily 90 tablet 0    cetirizine (ZyrTEC) 10 mg tablet Take 1 tablet (10 mg total) by mouth daily 30 tablet 0    Cholecalciferol (VITAMIN D3) 2000 units capsule Take 1 capsule by mouth daily      fluticasone (FLONASE) 50 mcg/act nasal spray 1 spray into each nostril daily 16 g 1    hydrochlorothiazide (HYDRODIURIL) 12 5 mg tablet Take 1 tablet (12 5 mg total) by mouth daily 90 tablet 0    losartan (COZAAR) 100 MG tablet Take 1 tablet (100 mg total) by mouth daily No further refill until seen in the office  90 tablet 0    potassium chloride (K-DUR,KLOR-CON) 10 mEq tablet Take 1 tablet (10 mEq total) by mouth daily 90 tablet 0    sertraline (ZOLOFT) 50 mg tablet Take 1 tablet (50 mg total) by mouth daily 90 tablet 0     No current facility-administered medications for this visit           Allergies   Allergen Reactions    Lisinopril Cough    Other      Seasonal allergies and animal fur       Review of Systems   Constitutional: Negative for activity change, appetite change, chills, diaphoresis, fatigue and fever  HENT: Negative for congestion, postnasal drip, rhinorrhea, sinus pressure, sinus pain, sneezing and sore throat  Eyes: Negative for visual disturbance  Respiratory: Negative for apnea, cough, choking, chest tightness, shortness of breath and wheezing  Cardiovascular: Negative for chest pain, palpitations and leg swelling  Gastrointestinal: Negative for abdominal distention, abdominal pain, anal bleeding, blood in stool, constipation, diarrhea, nausea and vomiting  Endocrine: Negative for cold intolerance and heat intolerance  Genitourinary: Negative for difficulty urinating, dysuria and hematuria  Musculoskeletal: Positive for arthralgias  Skin: Negative  Neurological: Negative for dizziness, weakness, light-headedness, numbness and headaches  Hematological: Negative for adenopathy  Psychiatric/Behavioral: Negative for agitation, sleep disturbance and suicidal ideas  All other systems reviewed and are negative  Video Exam    There were no vitals filed for this visit  Physical Exam  Vitals signs and nursing note reviewed  Constitutional:       General: She is not in acute distress  Appearance: She is well-developed  She is not diaphoretic  HENT:      Head: Normocephalic and atraumatic  Eyes:      General:         Right eye: No discharge  Left eye: No discharge  Conjunctiva/sclera: Conjunctivae normal    Neck:      Musculoskeletal: Normal range of motion  Pulmonary:      Effort: Pulmonary effort is normal  No respiratory distress  Abdominal:      General: There is no distension  Tenderness: There is no abdominal tenderness  Musculoskeletal:         General: No tenderness or deformity  Skin:     Coloration: Skin is not pale  Findings: No erythema or rash  Neurological:      Mental Status: She is alert and oriented to person, place, and time  Cranial Nerves: No cranial nerve deficit     Psychiatric:         Behavior: Behavior normal          Thought Content: Thought content normal          Judgment: Judgment normal           I spent 25 minutes with patient today in which greater than 50% of the time was spent in counseling/coordination of care regarding counseling, reviewing medication regimen, discussing treatment and follow up plan  VIRTUAL VISIT DISCLAIMER    Kehinde Sutton acknowledges that she has consented to an online visit or consultation  She understands that the online visit is based solely on information provided by her, and that, in the absence of a face-to-face physical evaluation by the physician, the diagnosis she receives is both limited and provisional in terms of accuracy and completeness  This is not intended to replace a full medical face-to-face evaluation by the physician  Kehinde Sutton understands and accepts these terms

## 2021-04-20 NOTE — PATIENT INSTRUCTIONS
Medicare Preventive Visit Patient Instructions  Thank you for completing your Welcome to Medicare Visit or Medicare Annual Wellness Visit today  Your next wellness visit will be due in one year (4/21/2022)  The screening/preventive services that you may require over the next 5-10 years are detailed below  Some tests may not apply to you based off risk factors and/or age  Screening tests ordered at today's visit but not completed yet may show as past due  Also, please note that scanned in results may not display below  Preventive Screenings:  Service Recommendations Previous Testing/Comments   Colorectal Cancer Screening  * Colonoscopy    * Fecal Occult Blood Test (FOBT)/Fecal Immunochemical Test (FIT)  * Fecal DNA/Cologuard Test  * Flexible Sigmoidoscopy Age: 54-65 years old   Colonoscopy: every 10 years (may be performed more frequently if at higher risk)  OR  FOBT/FIT: every 1 year  OR  Cologuard: every 3 years  OR  Sigmoidoscopy: every 5 years  Screening may be recommended earlier than age 48 if at higher risk for colorectal cancer  Also, an individualized decision between you and your healthcare provider will decide whether screening between the ages of 74-80 would be appropriate  Colonoscopy: Not on file  FOBT/FIT: 07/05/2018  Cologuard: Not on file  Sigmoidoscopy: Not on file          Breast Cancer Screening Age: 36 years old  Frequency: every 1-2 years  Not required if history of left and right mastectomy Mammogram: 07/18/2019    Screening Current   Cervical Cancer Screening Between the ages of 21-29, pap smear recommended once every 3 years  Between the ages of 33-67, can perform pap smear with HPV co-testing every 5 years     Recommendations may differ for women with a history of total hysterectomy, cervical cancer, or abnormal pap smears in past  Pap Smear: Not on file    Screening Not Indicated   Hepatitis C Screening Once for adults born between 1945 and 1965  More frequently in patients at high risk for Hepatitis C Hep C Antibody: 07/05/2018    Screening Current   Diabetes Screening 1-2 times per year if you're at risk for diabetes or have pre-diabetes Fasting glucose: 101 mg/dL   A1C: 6 3 %    Screening Current   Cholesterol Screening Once every 5 years if you don't have a lipid disorder  May order more often based on risk factors  Lipid panel: 09/28/2020    Screening Current     Other Preventive Screenings Covered by Medicare:  1  Abdominal Aortic Aneurysm (AAA) Screening: covered once if your at risk  You're considered to be at risk if you have a family history of AAA  2  Lung Cancer Screening: covers low dose CT scan once per year if you meet all of the following conditions: (1) Age 50-69; (2) No signs or symptoms of lung cancer; (3) Current smoker or have quit smoking within the last 15 years; (4) You have a tobacco smoking history of at least 30 pack years (packs per day multiplied by number of years you smoked); (5) You get a written order from a healthcare provider  3  Glaucoma Screening: covered annually if you're considered high risk: (1) You have diabetes OR (2) Family history of glaucoma OR (3)  aged 48 and older OR (3)  American aged 72 and older  3  Osteoporosis Screening: covered every 2 years if you meet one of the following conditions: (1) You're estrogen deficient and at risk for osteoporosis based off medical history and other findings; (2) Have a vertebral abnormality; (3) On glucocorticoid therapy for more than 3 months; (4) Have primary hyperparathyroidism; (5) On osteoporosis medications and need to assess response to drug therapy  · Last bone density test (DXA Scan): 06/20/2017  5  HIV Screening: covered annually if you're between the age of 12-76  Also covered annually if you are younger than 13 and older than 72 with risk factors for HIV infection  For pregnant patients, it is covered up to 3 times per pregnancy      Immunizations:  Immunization Recommendations   Influenza Vaccine Annual influenza vaccination during flu season is recommended for all persons aged >= 6 months who do not have contraindications   Pneumococcal Vaccine (Prevnar and Pneumovax)  * Prevnar = PCV13  * Pneumovax = PPSV23   Adults 25-60 years old: 1-3 doses may be recommended based on certain risk factors  Adults 72 years old: Prevnar (PCV13) vaccine recommended followed by Pneumovax (PPSV23) vaccine  If already received PPSV23 since turning 65, then PCV13 recommended at least one year after PPSV23 dose  Hepatitis B Vaccine 3 dose series if at intermediate or high risk (ex: diabetes, end stage renal disease, liver disease)   Tetanus (Td) Vaccine - COST NOT COVERED BY MEDICARE PART B Following completion of primary series, a booster dose should be given every 10 years to maintain immunity against tetanus  Td may also be given as tetanus wound prophylaxis  Tdap Vaccine - COST NOT COVERED BY MEDICARE PART B Recommended at least once for all adults  For pregnant patients, recommended with each pregnancy  Shingles Vaccine (Shingrix) - COST NOT COVERED BY MEDICARE PART B  2 shot series recommended in those aged 48 and above     Health Maintenance Due:      Topic Date Due    Colorectal Cancer Screening  07/05/2019    MAMMOGRAM  07/18/2020    Hepatitis C Screening  Completed     Immunizations Due:      Topic Date Due    COVID-19 Vaccine (1) Never done    DTaP,Tdap,and Td Vaccines (1 - Tdap) Never done    Influenza Vaccine (1) 09/01/2020     Advance Directives   What are advance directives? Advance directives are legal documents that state your wishes and plans for medical care  These plans are made ahead of time in case you lose your ability to make decisions for yourself  Advance directives can apply to any medical decision, such as the treatments you want, and if you want to donate organs  What are the types of advance directives?   There are many types of advance directives, and each state has rules about how to use them  You may choose a combination of any of the following:  · Living will: This is a written record of the treatment you want  You can also choose which treatments you do not want, which to limit, and which to stop at a certain time  This includes surgery, medicine, IV fluid, and tube feedings  · Durable power of  for healthcare [facility]): This is a written record that states who you want to make healthcare choices for you when you are unable to make them for yourself  This person, called a proxy, is usually a family member or a friend  You may choose more than 1 proxy  · Do not resuscitate (DNR) order:  A DNR order is used in case your heart stops beating or you stop breathing  It is a request not to have certain forms of treatment, such as CPR  A DNR order may be included in other types of advance directives  · Medical directive: This covers the care that you want if you are in a coma, near death, or unable to make decisions for yourself  You can list the treatments you want for each condition  Treatment may include pain medicine, surgery, blood transfusions, dialysis, IV or tube feedings, and a ventilator (breathing machine)  · Values history: This document has questions about your views, beliefs, and how you feel and think about life  This information can help others choose the care that you would choose  Why are advance directives important? An advance directive helps you control your care  Although spoken wishes may be used, it is better to have your wishes written down  Spoken wishes can be misunderstood, or not followed  Treatments may be given even if you do not want them  An advance directive may make it easier for your family to make difficult choices about your care  Urinary Incontinence   Urinary incontinence (UI)  is when you lose control of your bladder  UI develops because your bladder cannot store or empty urine properly   The 3 most common types of UI are stress incontinence, urge incontinence, or both  Medicines:   · May be given to help strengthen your bladder control  Report any side effects of medication to your healthcare provider  Do pelvic muscle exercises often:  Your pelvic muscles help you stop urinating  Squeeze these muscles tight for 5 seconds, then relax for 5 seconds  Gradually work up to squeezing for 10 seconds  Do 3 sets of 15 repetitions a day, or as directed  This will help strengthen your pelvic muscles and improve bladder control  Train your bladder:  Go to the bathroom at set times, such as every 2 hours, even if you do not feel the urge to go  You can also try to hold your urine when you feel the urge to go  For example, hold your urine for 5 minutes when you feel the urge to go  As that becomes easier, hold your urine for 10 minutes  Self-care:   · Keep a UI record  Write down how often you leak urine and how much you leak  Make a note of what you were doing when you leaked urine  · Drink liquids as directed  You may need to limit the amount of liquid you drink to help control your urine leakage  Do not drink any liquid right before you go to bed  Limit or do not have drinks that contain caffeine or alcohol  · Prevent constipation  Eat a variety of high-fiber foods  Good examples are high-fiber cereals, beans, vegetables, and whole-grain breads  Walking is the best way to trigger your intestines to have a bowel movement  · Exercise regularly and maintain a healthy weight  Weight loss and exercise will decrease pressure on your bladder and help you control your leakage  · Use a catheter as directed  to help empty your bladder  A catheter is a tiny, plastic tube that is put into your bladder to drain your urine  · Go to behavior therapy as directed  Behavior therapy may be used to help you learn to control your urge to urinate      Weight Management   Why it is important to manage your weight:  Being overweight increases your risk of health conditions such as heart disease, high blood pressure, type 2 diabetes, and certain types of cancer  It can also increase your risk for osteoarthritis, sleep apnea, and other respiratory problems  Aim for a slow, steady weight loss  Even a small amount of weight loss can lower your risk of health problems  How to lose weight safely:  A safe and healthy way to lose weight is to eat fewer calories and get regular exercise  You can lose up about 1 pound a week by decreasing the number of calories you eat by 500 calories each day  Healthy meal plan for weight management:  A healthy meal plan includes a variety of foods, contains fewer calories, and helps you stay healthy  A healthy meal plan includes the following:  · Eat whole-grain foods more often  A healthy meal plan should contain fiber  Fiber is the part of grains, fruits, and vegetables that is not broken down by your body  Whole-grain foods are healthy and provide extra fiber in your diet  Some examples of whole-grain foods are whole-wheat breads and pastas, oatmeal, brown rice, and bulgur  · Eat a variety of vegetables every day  Include dark, leafy greens such as spinach, kale, laure greens, and mustard greens  Eat yellow and orange vegetables such as carrots, sweet potatoes, and winter squash  · Eat a variety of fruits every day  Choose fresh or canned fruit (canned in its own juice or light syrup) instead of juice  Fruit juice has very little or no fiber  · Eat low-fat dairy foods  Drink fat-free (skim) milk or 1% milk  Eat fat-free yogurt and low-fat cottage cheese  Try low-fat cheeses such as mozzarella and other reduced-fat cheeses  · Choose meat and other protein foods that are low in fat  Choose beans or other legumes such as split peas or lentils  Choose fish, skinless poultry (chicken or turkey), or lean cuts of red meat (beef or pork)  Before you cook meat or poultry, cut off any visible fat  · Use less fat and oil  Try baking foods instead of frying them  Add less fat, such as margarine, sour cream, regular salad dressing and mayonnaise to foods  Eat fewer high-fat foods  Some examples of high-fat foods include french fries, doughnuts, ice cream, and cakes  · Eat fewer sweets  Limit foods and drinks that are high in sugar  This includes candy, cookies, regular soda, and sweetened drinks  Exercise:  Exercise at least 30 minutes per day on most days of the week  Some examples of exercise include walking, biking, dancing, and swimming  You can also fit in more physical activity by taking the stairs instead of the elevator or parking farther away from stores  Ask your healthcare provider about the best exercise plan for you  © Copyright [employer] 2018 Information is for End User's use only and may not be sold, redistributed or otherwise used for commercial purposes  All illustrations and images included in CareNotes® are the copyrighted property of [employer]  or [facility] Preventive Visit Patient Instructions  Thank you for completing your Welcome to Medicare Visit or Medicare Annual Wellness Visit today  Your next wellness visit will be due in one year (4/21/2022)  The screening/preventive services that you may require over the next 5-10 years are detailed below  Some tests may not apply to you based off risk factors and/or age  Screening tests ordered at today's visit but not completed yet may show as past due  Also, please note that scanned in results may not display below    Preventive Screenings:  Service Recommendations Previous Testing/Comments   Colorectal Cancer Screening  * Colonoscopy    * Fecal Occult Blood Test (FOBT)/Fecal Immunochemical Test (FIT)  * Fecal DNA/Cologuard Test  * Flexible Sigmoidoscopy Age: 54-65 years old   Colonoscopy: every 10 years (may be performed more frequently if at higher risk)  OR  FOBT/FIT: every 1 year  OR  Cologuard: every 3 years  OR  Sigmoidoscopy: every 5 years  Screening may be recommended earlier than age 48 if at higher risk for colorectal cancer  Also, an individualized decision between you and your healthcare provider will decide whether screening between the ages of 74-80 would be appropriate  Colonoscopy: Not on file  FOBT/FIT: 07/05/2018  Cologuard: Not on file  Sigmoidoscopy: Not on file          Breast Cancer Screening Age: 36 years old  Frequency: every 1-2 years  Not required if history of left and right mastectomy Mammogram: 07/18/2019    Screening Current   Cervical Cancer Screening Between the ages of 21-29, pap smear recommended once every 3 years  Between the ages of 33-67, can perform pap smear with HPV co-testing every 5 years  Recommendations may differ for women with a history of total hysterectomy, cervical cancer, or abnormal pap smears in past  Pap Smear: Not on file    Screening Not Indicated   Hepatitis C Screening Once for adults born between 1945 and 1965  More frequently in patients at high risk for Hepatitis C Hep C Antibody: 07/05/2018    Screening Current   Diabetes Screening 1-2 times per year if you're at risk for diabetes or have pre-diabetes Fasting glucose: 101 mg/dL   A1C: 6 3 %    Screening Current   Cholesterol Screening Once every 5 years if you don't have a lipid disorder  May order more often based on risk factors  Lipid panel: 09/28/2020    Screening Current     Other Preventive Screenings Covered by Medicare:  6  Abdominal Aortic Aneurysm (AAA) Screening: covered once if your at risk  You're considered to be at risk if you have a family history of AAA    7  Lung Cancer Screening: covers low dose CT scan once per year if you meet all of the following conditions: (1) Age 50-69; (2) No signs or symptoms of lung cancer; (3) Current smoker or have quit smoking within the last 15 years; (4) You have a tobacco smoking history of at least 30 pack years (packs per day multiplied by number of years you smoked); (5) You get a written order from a healthcare provider  8  Glaucoma Screening: covered annually if you're considered high risk: (1) You have diabetes OR (2) Family history of glaucoma OR (3)  aged 48 and older OR (3)  American aged 72 and older  5  Osteoporosis Screening: covered every 2 years if you meet one of the following conditions: (1) You're estrogen deficient and at risk for osteoporosis based off medical history and other findings; (2) Have a vertebral abnormality; (3) On glucocorticoid therapy for more than 3 months; (4) Have primary hyperparathyroidism; (5) On osteoporosis medications and need to assess response to drug therapy  · Last bone density test (DXA Scan): 06/20/2017   10  HIV Screening: covered annually if you're between the age of 15-65  Also covered annually if you are younger than 13 and older than 72 with risk factors for HIV infection  For pregnant patients, it is covered up to 3 times per pregnancy  Immunizations:  Immunization Recommendations   Influenza Vaccine Annual influenza vaccination during flu season is recommended for all persons aged >= 6 months who do not have contraindications   Pneumococcal Vaccine (Prevnar and Pneumovax)  * Prevnar = PCV13  * Pneumovax = PPSV23   Adults 25-60 years old: 1-3 doses may be recommended based on certain risk factors  Adults 72 years old: Prevnar (PCV13) vaccine recommended followed by Pneumovax (PPSV23) vaccine  If already received PPSV23 since turning 65, then PCV13 recommended at least one year after PPSV23 dose  Hepatitis B Vaccine 3 dose series if at intermediate or high risk (ex: diabetes, end stage renal disease, liver disease)   Tetanus (Td) Vaccine - COST NOT COVERED BY MEDICARE PART B Following completion of primary series, a booster dose should be given every 10 years to maintain immunity against tetanus  Td may also be given as tetanus wound prophylaxis     Tdap Vaccine - COST NOT COVERED BY MEDICARE PART B Recommended at least once for all adults  For pregnant patients, recommended with each pregnancy  Shingles Vaccine (Shingrix) - COST NOT COVERED BY MEDICARE PART B  2 shot series recommended in those aged 48 and above     Health Maintenance Due:      Topic Date Due    Colorectal Cancer Screening  07/05/2019    MAMMOGRAM  07/18/2020    Hepatitis C Screening  Completed     Immunizations Due:      Topic Date Due    COVID-19 Vaccine (1) Never done    DTaP,Tdap,and Td Vaccines (1 - Tdap) Never done    Influenza Vaccine (1) 09/01/2020     Advance Directives   What are advance directives? Advance directives are legal documents that state your wishes and plans for medical care  These plans are made ahead of time in case you lose your ability to make decisions for yourself  Advance directives can apply to any medical decision, such as the treatments you want, and if you want to donate organs  What are the types of advance directives? There are many types of advance directives, and each state has rules about how to use them  You may choose a combination of any of the following:  · Living will: This is a written record of the treatment you want  You can also choose which treatments you do not want, which to limit, and which to stop at a certain time  This includes surgery, medicine, IV fluid, and tube feedings  · Durable power of  for healthcare [facility]): This is a written record that states who you want to make healthcare choices for you when you are unable to make them for yourself  This person, called a proxy, is usually a family member or a friend  You may choose more than 1 proxy  · Do not resuscitate (DNR) order:  A DNR order is used in case your heart stops beating or you stop breathing  It is a request not to have certain forms of treatment, such as CPR  A DNR order may be included in other types of advance directives  · Medical directive:   This covers the care that you want if you are in a coma, near death, or unable to make decisions for yourself  You can list the treatments you want for each condition  Treatment may include pain medicine, surgery, blood transfusions, dialysis, IV or tube feedings, and a ventilator (breathing machine)  · Values history: This document has questions about your views, beliefs, and how you feel and think about life  This information can help others choose the care that you would choose  Why are advance directives important? An advance directive helps you control your care  Although spoken wishes may be used, it is better to have your wishes written down  Spoken wishes can be misunderstood, or not followed  Treatments may be given even if you do not want them  An advance directive may make it easier for your family to make difficult choices about your care  Urinary Incontinence   Urinary incontinence (UI)  is when you lose control of your bladder  UI develops because your bladder cannot store or empty urine properly  The 3 most common types of UI are stress incontinence, urge incontinence, or both  Medicines:   · May be given to help strengthen your bladder control  Report any side effects of medication to your healthcare provider  Do pelvic muscle exercises often:  Your pelvic muscles help you stop urinating  Squeeze these muscles tight for 5 seconds, then relax for 5 seconds  Gradually work up to squeezing for 10 seconds  Do 3 sets of 15 repetitions a day, or as directed  This will help strengthen your pelvic muscles and improve bladder control  Train your bladder:  Go to the bathroom at set times, such as every 2 hours, even if you do not feel the urge to go  You can also try to hold your urine when you feel the urge to go  For example, hold your urine for 5 minutes when you feel the urge to go  As that becomes easier, hold your urine for 10 minutes  Self-care:   · Keep a UI record    Write down how often you leak urine and how much you leak  Make a note of what you were doing when you leaked urine  · Drink liquids as directed  You may need to limit the amount of liquid you drink to help control your urine leakage  Do not drink any liquid right before you go to bed  Limit or do not have drinks that contain caffeine or alcohol  · Prevent constipation  Eat a variety of high-fiber foods  Good examples are high-fiber cereals, beans, vegetables, and whole-grain breads  Walking is the best way to trigger your intestines to have a bowel movement  · Exercise regularly and maintain a healthy weight  Weight loss and exercise will decrease pressure on your bladder and help you control your leakage  · Use a catheter as directed  to help empty your bladder  A catheter is a tiny, plastic tube that is put into your bladder to drain your urine  · Go to behavior therapy as directed  Behavior therapy may be used to help you learn to control your urge to urinate  Weight Management   Why it is important to manage your weight:  Being overweight increases your risk of health conditions such as heart disease, high blood pressure, type 2 diabetes, and certain types of cancer  It can also increase your risk for osteoarthritis, sleep apnea, and other respiratory problems  Aim for a slow, steady weight loss  Even a small amount of weight loss can lower your risk of health problems  How to lose weight safely:  A safe and healthy way to lose weight is to eat fewer calories and get regular exercise  You can lose up about 1 pound a week by decreasing the number of calories you eat by 500 calories each day  Healthy meal plan for weight management:  A healthy meal plan includes a variety of foods, contains fewer calories, and helps you stay healthy  A healthy meal plan includes the following:  · Eat whole-grain foods more often  A healthy meal plan should contain fiber  Fiber is the part of grains, fruits, and vegetables that is not broken down by your body  Whole-grain foods are healthy and provide extra fiber in your diet  Some examples of whole-grain foods are whole-wheat breads and pastas, oatmeal, brown rice, and bulgur  · Eat a variety of vegetables every day  Include dark, leafy greens such as spinach, kale, laure greens, and mustard greens  Eat yellow and orange vegetables such as carrots, sweet potatoes, and winter squash  · Eat a variety of fruits every day  Choose fresh or canned fruit (canned in its own juice or light syrup) instead of juice  Fruit juice has very little or no fiber  · Eat low-fat dairy foods  Drink fat-free (skim) milk or 1% milk  Eat fat-free yogurt and low-fat cottage cheese  Try low-fat cheeses such as mozzarella and other reduced-fat cheeses  · Choose meat and other protein foods that are low in fat  Choose beans or other legumes such as split peas or lentils  Choose fish, skinless poultry (chicken or turkey), or lean cuts of red meat (beef or pork)  Before you cook meat or poultry, cut off any visible fat  · Use less fat and oil  Try baking foods instead of frying them  Add less fat, such as margarine, sour cream, regular salad dressing and mayonnaise to foods  Eat fewer high-fat foods  Some examples of high-fat foods include french fries, doughnuts, ice cream, and cakes  · Eat fewer sweets  Limit foods and drinks that are high in sugar  This includes candy, cookies, regular soda, and sweetened drinks  Exercise:  Exercise at least 30 minutes per day on most days of the week  Some examples of exercise include walking, biking, dancing, and swimming  You can also fit in more physical activity by taking the stairs instead of the elevator or parking farther away from stores  Ask your healthcare provider about the best exercise plan for you  © Copyright Georgina Goodman 2018 Information is for End User's use only and may not be sold, redistributed or otherwise used for commercial purposes   All illustrations and images included in [address] are the copyrighted property of A D A M , Inc  or [facility] Murray Weir

## 2021-04-20 NOTE — ASSESSMENT & PLAN NOTE
Discussed performing range of motion and stretching exercises  Continue Tylenol and CBD as needed for pain

## 2021-05-10 ENCOUNTER — TELEPHONE (OUTPATIENT)
Dept: INTERNAL MEDICINE CLINIC | Facility: CLINIC | Age: 71
End: 2021-05-10

## 2021-05-10 NOTE — TELEPHONE ENCOUNTER
LMOM for pt to call me at [facility] [place] office      Per Dr Romi Hernandez, he would like pt to return cologuard within next week  She can return kit at any one of the offices

## 2021-05-20 NOTE — TELEPHONE ENCOUNTER
When pt goes to her daughter's house she will get some help and then be able to return kit  Will be a little bit before July when pt will be able to do test       Pt will return to one of the offices and we will ship back to company for her

## 2021-06-22 NOTE — TELEPHONE ENCOUNTER
LMOM for pt to call me at [facility] office    Pt is due to return Cologuard and pt is due for mammogram   Please assist with scheduling

## 2021-07-22 ENCOUNTER — OFFICE VISIT (OUTPATIENT)
Dept: INTERNAL MEDICINE CLINIC | Facility: CLINIC | Age: 71
End: 2021-07-22
Payer: MEDICARE

## 2021-07-22 VITALS
HEART RATE: 72 BPM | HEIGHT: 62 IN | BODY MASS INDEX: 34.37 KG/M2 | OXYGEN SATURATION: 97 % | TEMPERATURE: 97.6 F | SYSTOLIC BLOOD PRESSURE: 134 MMHG | DIASTOLIC BLOOD PRESSURE: 76 MMHG | WEIGHT: 186.8 LBS | RESPIRATION RATE: 20 BRPM

## 2021-07-22 DIAGNOSIS — I10 ESSENTIAL HYPERTENSION, BENIGN: ICD-10-CM

## 2021-07-22 DIAGNOSIS — R73.01 IMPAIRED FASTING BLOOD SUGAR: Primary | ICD-10-CM

## 2021-07-22 DIAGNOSIS — F41.9 ANXIETY: ICD-10-CM

## 2021-07-22 DIAGNOSIS — E78.5 DYSLIPIDEMIA: ICD-10-CM

## 2021-07-22 DIAGNOSIS — J30.81 ALLERGIC RHINITIS DUE TO ANIMAL HAIR AND DANDER: ICD-10-CM

## 2021-07-22 DIAGNOSIS — E87.6 HYPOKALEMIA: ICD-10-CM

## 2021-07-22 DIAGNOSIS — F34.1 DYSTHYMIA: ICD-10-CM

## 2021-07-22 DIAGNOSIS — I10 BENIGN ESSENTIAL HTN: ICD-10-CM

## 2021-07-22 DIAGNOSIS — Z91.09 ENVIRONMENTAL ALLERGIES: ICD-10-CM

## 2021-07-22 PROCEDURE — 99214 OFFICE O/P EST MOD 30 MIN: CPT | Performed by: INTERNAL MEDICINE

## 2021-07-22 RX ORDER — ATORVASTATIN CALCIUM 40 MG/1
40 TABLET, FILM COATED ORAL DAILY
Qty: 90 TABLET | Refills: 1 | Status: SHIPPED | OUTPATIENT
Start: 2021-07-22 | End: 2022-02-09 | Stop reason: SDUPTHER

## 2021-07-22 RX ORDER — POTASSIUM CHLORIDE 750 MG/1
10 TABLET, EXTENDED RELEASE ORAL DAILY
Qty: 90 TABLET | Refills: 1 | Status: SHIPPED | OUTPATIENT
Start: 2021-07-22 | End: 2022-02-09 | Stop reason: SDUPTHER

## 2021-07-22 RX ORDER — FLUTICASONE PROPIONATE 50 MCG
1 SPRAY, SUSPENSION (ML) NASAL DAILY PRN
Qty: 16 G | Refills: 3 | Status: SHIPPED | OUTPATIENT
Start: 2021-07-22

## 2021-07-22 RX ORDER — AMLODIPINE BESYLATE 10 MG/1
10 TABLET ORAL DAILY
Qty: 90 TABLET | Refills: 1 | Status: SHIPPED | OUTPATIENT
Start: 2021-07-22 | End: 2022-02-09 | Stop reason: SDUPTHER

## 2021-07-22 RX ORDER — CETIRIZINE HYDROCHLORIDE 10 MG/1
10 TABLET ORAL DAILY
Qty: 90 TABLET | Refills: 1 | Status: SHIPPED | OUTPATIENT
Start: 2021-07-22 | End: 2022-02-09 | Stop reason: SDUPTHER

## 2021-07-22 RX ORDER — HYDROCHLOROTHIAZIDE 12.5 MG/1
12.5 TABLET ORAL DAILY
Qty: 90 TABLET | Refills: 1 | Status: SHIPPED | OUTPATIENT
Start: 2021-07-22 | End: 2022-02-09 | Stop reason: SDUPTHER

## 2021-07-22 RX ORDER — LOSARTAN POTASSIUM 100 MG/1
100 TABLET ORAL DAILY
Qty: 90 TABLET | Refills: 1 | Status: SHIPPED | OUTPATIENT
Start: 2021-07-22 | End: 2022-02-09 | Stop reason: SDUPTHER

## 2021-07-22 NOTE — PROGRESS NOTES
Assessment/Plan:    Impaired fasting blood sugar  Discussed lifestyle modification with diet and exercise  Will check A1c  Allergic rhinitis  Controlled  Continue Zyrtec and Flonase  Benign essential HTN  Controlled  Continue losartan 100 mg daily, hydrochlorothiazide 12 5 mg daily, and amlodipine 10 mg daily  Depression  Stable  She continues to grieve since the passing of her   Continue Zoloft 50 mg daily  Dyslipidemia  Continue atorvastatin 40 mg daily  Follow-up lipid panel  Hypokalemia  Follow-up CMP  Continue potassium supplementation, 10 mEq daily  Diagnoses and all orders for this visit:    Impaired fasting blood sugar    Benign essential HTN  -     potassium chloride (K-DUR,KLOR-CON) 10 mEq tablet; Take 1 tablet (10 mEq total) by mouth daily    Dysthymia  -     sertraline (ZOLOFT) 50 mg tablet; Take 1 tablet (50 mg total) by mouth daily    Dyslipidemia  -     atorvastatin (LIPITOR) 40 mg tablet; Take 1 tablet (40 mg total) by mouth daily    Hypokalemia  -     potassium chloride (K-DUR,KLOR-CON) 10 mEq tablet; Take 1 tablet (10 mEq total) by mouth daily    Anxiety  -     sertraline (ZOLOFT) 50 mg tablet; Take 1 tablet (50 mg total) by mouth daily    Essential hypertension, benign  -     amLODIPine (NORVASC) 10 mg tablet; Take 1 tablet (10 mg total) by mouth daily No further refill until seen in the office   -     hydrochlorothiazide (HYDRODIURIL) 12 5 mg tablet; Take 1 tablet (12 5 mg total) by mouth daily  -     losartan (COZAAR) 100 MG tablet; Take 1 tablet (100 mg total) by mouth daily No further refill until seen in the office  Environmental allergies  -     cetirizine (ZyrTEC) 10 mg tablet; Take 1 tablet (10 mg total) by mouth daily    Allergic rhinitis due to animal hair and dander  -     cetirizine (ZyrTEC) 10 mg tablet;  Take 1 tablet (10 mg total) by mouth daily  -     fluticasone (FLONASE) 50 mcg/act nasal spray; 1 spray into each nostril daily as needed for rhinitis or allergies    Body mass index (BMI) of 33 0-33 9 in adult          BMI Counseling: Body mass index is 34 17 kg/m²  The BMI is above normal  Nutrition recommendations include decreasing portion sizes, encouraging healthy choices of fruits and vegetables, decreasing fast food intake, consuming healthier snacks, limiting drinks that contain sugar, moderation in carbohydrate intake, increasing intake of lean protein, reducing intake of saturated and trans fat and reducing intake of cholesterol  Exercise recommendations include moderate physical activity 150 minutes/week and exercising 3-5 times per week  No pharmacotherapy was ordered  Patient referred to PCP due to patient being overweight  Depression Screening and Follow-up Plan: Clincally patient does not have depression  No treatment is required  Patient advised to follow-up with PCP for further management  Falls Plan of Care: balance, strength, and gait training instructions were provided  Medications that increase falls were reviewed  Vitamin D supplementation was recommended  Subjective:      Patient ID: Emmanuel Cancino is a 70 y o  female  Chief Complaint   Patient presents with    Follow-up     3 month, BW from April not done   health maintenance     bmi adult, cologuard and mammo ordered in October       70year-old female seen today for follow-up of chronic conditions  No laboratory studies to review today however she will have them completed later today  She has been experiencing bilateral hip and right knee ache with prolonged periods of walking  She takes ibuprofen which provides relief  Otherwise, she has no active complaints or concerns at this time  She has been compliant with medication regimen  Season allergies:  Controlled with Zyrtec and Flonase  Hip Pain   The incident occurred more than 1 week ago  There was no injury mechanism  The pain is present in the left hip and right knee   The quality of the pain is described as aching  Pertinent negatives include no numbness  She reports no foreign bodies present  The symptoms are aggravated by movement and weight bearing  She has tried NSAIDs for the symptoms  The treatment provided moderate relief  Knee Pain   Pertinent negatives include no numbness  Hypertension  This is a chronic problem  The current episode started more than 1 year ago  The problem is unchanged  The problem is controlled  Associated symptoms include anxiety  Pertinent negatives include no chest pain, headaches, palpitations or shortness of breath  Past treatments include angiotensin blockers, calcium channel blockers and diuretics  The current treatment provides moderate improvement  There are no compliance problems  Hyperlipidemia  This is a chronic problem  The current episode started more than 1 year ago  The problem is controlled  Recent lipid tests were reviewed and are normal  Pertinent negatives include no chest pain or shortness of breath  Current antihyperlipidemic treatment includes statins  The current treatment provides moderate improvement of lipids  There are no compliance problems  Anxiety  Presents for follow-up visit  Patient reports no chest pain, dizziness, nausea, palpitations, shortness of breath or suicidal ideas  Symptoms occur occasionally  The severity of symptoms is mild  The patient sleeps 8 hours per night  The quality of sleep is good  Compliance with medications is %  The following portions of the patient's history were reviewed and updated as appropriate: allergies, current medications, past family history, past medical history, past social history, past surgical history and problem list     Review of Systems   Constitutional: Negative for activity change, appetite change, chills, diaphoresis, fatigue and fever  HENT: Negative for congestion, postnasal drip, rhinorrhea, sinus pressure, sinus pain, sneezing and sore throat      Eyes: Negative for visual disturbance  Respiratory: Negative for apnea, cough, choking, chest tightness, shortness of breath and wheezing  Cardiovascular: Negative for chest pain, palpitations and leg swelling  Gastrointestinal: Negative for abdominal distention, abdominal pain, anal bleeding, blood in stool, constipation, diarrhea, nausea and vomiting  Endocrine: Negative for cold intolerance and heat intolerance  Genitourinary: Negative for difficulty urinating, dysuria and hematuria  Musculoskeletal: Negative  Skin: Negative  Neurological: Negative for dizziness, weakness, light-headedness, numbness and headaches  Hematological: Negative for adenopathy  Psychiatric/Behavioral: Negative for agitation, sleep disturbance and suicidal ideas  All other systems reviewed and are negative  Past Medical History:   Diagnosis Date    Abnormal mammogram 5/17/2018    Anxiety     Arthritis     B-complex deficiency     Depression     Diabetes mellitus ([facility] [address] )     Elevated blood protein 7/13/2018    Glaucoma     Gout     Hypercalcemia     Hyperlipidemia     Last assessed: 8/5/15    Irregular heart beat     Memory loss     Need for shingles vaccine 5/17/2018    Palpitations     Suicidal ideation          Current Outpatient Medications:     albuterol (ProAir HFA) 90 mcg/act inhaler, Inhale 2 puffs as needed for wheezing or shortness of breath, Disp: 1 Inhaler, Rfl: 3    amLODIPine (NORVASC) 10 mg tablet, Take 1 tablet (10 mg total) by mouth daily No further refill until seen in the office  , Disp: 90 tablet, Rfl: 1    aspirin 81 MG tablet, Take 1 tablet by mouth daily, Disp: , Rfl:     atorvastatin (LIPITOR) 40 mg tablet, Take 1 tablet (40 mg total) by mouth daily, Disp: 90 tablet, Rfl: 1    cetirizine (ZyrTEC) 10 mg tablet, Take 1 tablet (10 mg total) by mouth daily, Disp: 90 tablet, Rfl: 1    Cholecalciferol (VITAMIN D3) 2000 units capsule, Take 1 capsule by mouth daily, Disp: , Rfl:   fluticasone (FLONASE) 50 mcg/act nasal spray, 1 spray into each nostril daily as needed for rhinitis or allergies, Disp: 16 g, Rfl: 3    hydrochlorothiazide (HYDRODIURIL) 12 5 mg tablet, Take 1 tablet (12 5 mg total) by mouth daily, Disp: 90 tablet, Rfl: 1    losartan (COZAAR) 100 MG tablet, Take 1 tablet (100 mg total) by mouth daily No further refill until seen in the office  , Disp: 90 tablet, Rfl: 1    potassium chloride (K-DUR,KLOR-CON) 10 mEq tablet, Take 1 tablet (10 mEq total) by mouth daily, Disp: 90 tablet, Rfl: 1    sertraline (ZOLOFT) 50 mg tablet, Take 1 tablet (50 mg total) by mouth daily, Disp: 90 tablet, Rfl: 1    Allergies   Allergen Reactions    Lisinopril Cough    Other      Seasonal allergies and animal fur       Social History   Past Surgical History:   Procedure Laterality Date    TUBAL LIGATION      VAGINAL DELIVERY      x2     Family History   Problem Relation Age of Onset    Hypertension Mother         Benign Essential    Cancer Mother     Rectal cancer Mother     Colon cancer Mother         unknown age of onset   [employer] Hypertension Father         Benign Essential    Cancer Father     Diabetes Sister     Cirrhosis Daughter         Hepatic    Hepatitis Daughter         Type C Viral    Pancreatic cancer Cousin     No Known Problems Maternal Grandmother     No Known Problems Maternal Grandfather     No Known Problems Paternal Grandmother     No Known Problems Paternal Grandfather     No Known Problems Sister     No Known Problems Sister     No Known Problems Sister     No Known Problems Sister     No Known Problems Sister     No Known Problems Sister     Breast cancer Cousin        Objective:  /76 (BP Location: Left arm, Patient Position: Sitting, Cuff Size: Standard)   Pulse 72   Temp 97 6 °F (36 4 °C) (Temporal)   Resp 20   Ht 5' 2" (1 575 m)   Wt 84 7 kg (186 lb 12 8 oz)   SpO2 97%   BMI 34 17 kg/m²     No results found for this or any previous visit (from the past 1344 hour(s))  Physical Exam  Vitals and nursing note reviewed  Constitutional:       General: She is not in acute distress  Appearance: She is well-developed  She is not diaphoretic  HENT:      Head: Normocephalic and atraumatic  Eyes:      General:         Right eye: No discharge  Left eye: No discharge  Conjunctiva/sclera: Conjunctivae normal       Pupils: Pupils are equal, round, and reactive to light  Neck:      Thyroid: No thyromegaly  Vascular: No JVD  Cardiovascular:      Rate and Rhythm: Normal rate and regular rhythm  Heart sounds: Normal heart sounds  No murmur heard  No friction rub  No gallop  Pulmonary:      Effort: Pulmonary effort is normal  No respiratory distress  Breath sounds: Normal breath sounds  No wheezing or rales  Chest:      Chest wall: No tenderness  Abdominal:      General: There is no distension  Palpations: Abdomen is soft  Tenderness: There is no abdominal tenderness  Musculoskeletal:         General: No tenderness or deformity  Normal range of motion  Cervical back: Normal range of motion and neck supple  Lymphadenopathy:      Cervical: No cervical adenopathy  Skin:     General: Skin is warm and dry  Coloration: Skin is not pale  Findings: No erythema or rash  Neurological:      Mental Status: She is alert and oriented to person, place, and time  Cranial Nerves: No cranial nerve deficit  Coordination: Coordination normal    Psychiatric:         Behavior: Behavior normal          Thought Content:  Thought content normal          Judgment: Judgment normal

## 2021-07-22 NOTE — ASSESSMENT & PLAN NOTE
Controlled  Continue losartan 100 mg daily, hydrochlorothiazide 12 5 mg daily, and amlodipine 10 mg daily

## 2021-11-27 ENCOUNTER — APPOINTMENT (OUTPATIENT)
Dept: LAB | Facility: HOSPITAL | Age: 71
End: 2021-11-27
Payer: MEDICARE

## 2021-11-27 DIAGNOSIS — R73.01 IMPAIRED FASTING BLOOD SUGAR: ICD-10-CM

## 2021-11-27 DIAGNOSIS — E78.5 DYSLIPIDEMIA: ICD-10-CM

## 2021-11-27 DIAGNOSIS — I10 BENIGN ESSENTIAL HTN: ICD-10-CM

## 2021-11-27 DIAGNOSIS — E87.6 HYPOKALEMIA: ICD-10-CM

## 2021-11-27 LAB
ALBUMIN SERPL BCP-MCNC: 3.8 G/DL (ref 3.5–5)
ALP SERPL-CCNC: 89 U/L (ref 46–116)
ALT SERPL W P-5'-P-CCNC: 19 U/L (ref 12–78)
ANION GAP SERPL CALCULATED.3IONS-SCNC: 9 MMOL/L (ref 4–13)
AST SERPL W P-5'-P-CCNC: 19 U/L (ref 5–45)
BASOPHILS # BLD AUTO: 0.07 THOUSANDS/ΜL (ref 0–0.1)
BASOPHILS NFR BLD AUTO: 2 % (ref 0–1)
BILIRUB SERPL-MCNC: 0.63 MG/DL (ref 0.2–1)
BUN SERPL-MCNC: 21 MG/DL (ref 5–25)
CALCIUM SERPL-MCNC: 9.3 MG/DL (ref 8.3–10.1)
CHLORIDE SERPL-SCNC: 103 MMOL/L (ref 100–108)
CHOLEST SERPL-MCNC: 202 MG/DL
CO2 SERPL-SCNC: 29 MMOL/L (ref 21–32)
CREAT SERPL-MCNC: 1.15 MG/DL (ref 0.6–1.3)
EOSINOPHIL # BLD AUTO: 0.38 THOUSAND/ΜL (ref 0–0.61)
EOSINOPHIL NFR BLD AUTO: 8 % (ref 0–6)
ERYTHROCYTE [DISTWIDTH] IN BLOOD BY AUTOMATED COUNT: 13.2 % (ref 11.6–15.1)
GFR SERPL CREATININE-BSD FRML MDRD: 55 ML/MIN/1.73SQ M
GLUCOSE P FAST SERPL-MCNC: 104 MG/DL (ref 65–99)
HCT VFR BLD AUTO: 36.8 % (ref 34.8–46.1)
HDLC SERPL-MCNC: 52 MG/DL
HGB BLD-MCNC: 11.7 G/DL (ref 11.5–15.4)
IMM GRANULOCYTES # BLD AUTO: 0.01 THOUSAND/UL (ref 0–0.2)
IMM GRANULOCYTES NFR BLD AUTO: 0 % (ref 0–2)
LDLC SERPL CALC-MCNC: 125 MG/DL (ref 0–100)
LYMPHOCYTES # BLD AUTO: 2.02 THOUSANDS/ΜL (ref 0.6–4.47)
LYMPHOCYTES NFR BLD AUTO: 41 % (ref 14–44)
MCH RBC QN AUTO: 29.1 PG (ref 26.8–34.3)
MCHC RBC AUTO-ENTMCNC: 31.8 G/DL (ref 31.4–37.4)
MCV RBC AUTO: 92 FL (ref 82–98)
MONOCYTES # BLD AUTO: 0.46 THOUSAND/ΜL (ref 0.17–1.22)
MONOCYTES NFR BLD AUTO: 10 % (ref 4–12)
NEUTROPHILS # BLD AUTO: 1.84 THOUSANDS/ΜL (ref 1.85–7.62)
NEUTS SEG NFR BLD AUTO: 39 % (ref 43–75)
NONHDLC SERPL-MCNC: 150 MG/DL
NRBC BLD AUTO-RTO: 0 /100 WBCS
PLATELET # BLD AUTO: 306 THOUSANDS/UL (ref 149–390)
PMV BLD AUTO: 8.9 FL (ref 8.9–12.7)
POTASSIUM SERPL-SCNC: 4.3 MMOL/L (ref 3.5–5.3)
PROT SERPL-MCNC: 8.8 G/DL (ref 6.4–8.2)
RBC # BLD AUTO: 4.02 MILLION/UL (ref 3.81–5.12)
SODIUM SERPL-SCNC: 141 MMOL/L (ref 136–145)
TRIGL SERPL-MCNC: 125 MG/DL
WBC # BLD AUTO: 4.78 THOUSAND/UL (ref 4.31–10.16)

## 2021-11-27 PROCEDURE — 80061 LIPID PANEL: CPT

## 2021-11-27 PROCEDURE — 85025 COMPLETE CBC W/AUTO DIFF WBC: CPT

## 2021-11-27 PROCEDURE — 83036 HEMOGLOBIN GLYCOSYLATED A1C: CPT

## 2021-11-27 PROCEDURE — 80053 COMPREHEN METABOLIC PANEL: CPT

## 2021-11-27 PROCEDURE — 36415 COLL VENOUS BLD VENIPUNCTURE: CPT

## 2021-11-28 LAB
EST. AVERAGE GLUCOSE BLD GHB EST-MCNC: 137 MG/DL
HBA1C MFR BLD: 6.4 %

## 2021-12-06 ENCOUNTER — OFFICE VISIT (OUTPATIENT)
Dept: INTERNAL MEDICINE CLINIC | Facility: CLINIC | Age: 71
End: 2021-12-06
Payer: MEDICARE

## 2021-12-06 VITALS
HEART RATE: 73 BPM | OXYGEN SATURATION: 98 % | HEIGHT: 62 IN | RESPIRATION RATE: 18 BRPM | BODY MASS INDEX: 33.57 KG/M2 | TEMPERATURE: 98.4 F | DIASTOLIC BLOOD PRESSURE: 80 MMHG | SYSTOLIC BLOOD PRESSURE: 110 MMHG | WEIGHT: 182.4 LBS

## 2021-12-06 DIAGNOSIS — M17.11 PRIMARY OSTEOARTHRITIS OF RIGHT KNEE: ICD-10-CM

## 2021-12-06 DIAGNOSIS — Z78.0 ASYMPTOMATIC POSTMENOPAUSAL STATE: ICD-10-CM

## 2021-12-06 DIAGNOSIS — F34.1 DYSTHYMIA: ICD-10-CM

## 2021-12-06 DIAGNOSIS — N18.30 STAGE 3 CHRONIC KIDNEY DISEASE, UNSPECIFIED WHETHER STAGE 3A OR 3B CKD (HCC): ICD-10-CM

## 2021-12-06 DIAGNOSIS — R73.01 IMPAIRED FASTING BLOOD SUGAR: ICD-10-CM

## 2021-12-06 DIAGNOSIS — F41.9 ANXIETY: ICD-10-CM

## 2021-12-06 DIAGNOSIS — Z12.11 SCREENING FOR COLON CANCER: Primary | ICD-10-CM

## 2021-12-06 DIAGNOSIS — E78.5 DYSLIPIDEMIA: ICD-10-CM

## 2021-12-06 DIAGNOSIS — I10 BENIGN ESSENTIAL HTN: ICD-10-CM

## 2021-12-06 PROBLEM — K08.9 POOR DENTITION: Status: RESOLVED | Noted: 2018-05-17 | Resolved: 2021-12-06

## 2021-12-06 PROCEDURE — 99214 OFFICE O/P EST MOD 30 MIN: CPT | Performed by: INTERNAL MEDICINE

## 2022-01-19 ENCOUNTER — HOSPITAL ENCOUNTER (OUTPATIENT)
Dept: MAMMOGRAPHY | Facility: CLINIC | Age: 72
Discharge: HOME | End: 2022-01-19
Payer: MEDICARE

## 2022-01-19 VITALS — HEIGHT: 62 IN | BODY MASS INDEX: 33.49 KG/M2 | WEIGHT: 182 LBS

## 2022-01-19 DIAGNOSIS — Z12.31 ENCOUNTER FOR SCREENING MAMMOGRAM FOR MALIGNANT NEOPLASM OF BREAST: ICD-10-CM

## 2022-01-19 PROCEDURE — 77063 BREAST TOMOSYNTHESIS BI: CPT

## 2022-01-19 PROCEDURE — 77067 SCR MAMMO BI INCL CAD: CPT

## 2022-02-09 DIAGNOSIS — Z91.09 ENVIRONMENTAL ALLERGIES: ICD-10-CM

## 2022-02-09 DIAGNOSIS — J30.81 ALLERGIC RHINITIS DUE TO ANIMAL HAIR AND DANDER: ICD-10-CM

## 2022-02-09 DIAGNOSIS — T75.3XXA MOTION SICKNESS, INITIAL ENCOUNTER: Primary | ICD-10-CM

## 2022-02-09 DIAGNOSIS — E87.6 HYPOKALEMIA: ICD-10-CM

## 2022-02-09 DIAGNOSIS — I10 BENIGN ESSENTIAL HTN: ICD-10-CM

## 2022-02-09 DIAGNOSIS — F34.1 DYSTHYMIA: ICD-10-CM

## 2022-02-09 DIAGNOSIS — E78.5 DYSLIPIDEMIA: ICD-10-CM

## 2022-02-09 DIAGNOSIS — F41.9 ANXIETY: ICD-10-CM

## 2022-02-09 DIAGNOSIS — I10 ESSENTIAL HYPERTENSION, BENIGN: ICD-10-CM

## 2022-02-09 RX ORDER — MECLIZINE HCL 12.5 MG/1
12.5 TABLET ORAL DAILY PRN
Qty: 30 TABLET | Refills: 0 | Status: SHIPPED | OUTPATIENT
Start: 2022-02-09

## 2022-02-09 RX ORDER — POTASSIUM CHLORIDE 750 MG/1
10 TABLET, EXTENDED RELEASE ORAL DAILY
Qty: 90 TABLET | Refills: 1 | Status: SHIPPED | OUTPATIENT
Start: 2022-02-09 | End: 2022-02-11 | Stop reason: SDUPTHER

## 2022-02-09 RX ORDER — ATORVASTATIN CALCIUM 40 MG/1
40 TABLET, FILM COATED ORAL DAILY
Qty: 90 TABLET | Refills: 1 | Status: SHIPPED | OUTPATIENT
Start: 2022-02-09 | End: 2022-02-11 | Stop reason: SDUPTHER

## 2022-02-09 RX ORDER — CETIRIZINE HYDROCHLORIDE 10 MG/1
10 TABLET ORAL DAILY
Qty: 90 TABLET | Refills: 1 | Status: SHIPPED | OUTPATIENT
Start: 2022-02-09 | End: 2022-02-11 | Stop reason: SDUPTHER

## 2022-02-09 RX ORDER — AMLODIPINE BESYLATE 10 MG/1
10 TABLET ORAL DAILY
Qty: 90 TABLET | Refills: 1 | Status: SHIPPED | OUTPATIENT
Start: 2022-02-09 | End: 2022-02-11 | Stop reason: SDUPTHER

## 2022-02-09 RX ORDER — HYDROCHLOROTHIAZIDE 12.5 MG/1
12.5 TABLET ORAL DAILY
Qty: 90 TABLET | Refills: 1 | Status: SHIPPED | OUTPATIENT
Start: 2022-02-09 | End: 2022-02-11 | Stop reason: SDUPTHER

## 2022-02-09 RX ORDER — LOSARTAN POTASSIUM 100 MG/1
100 TABLET ORAL DAILY
Qty: 90 TABLET | Refills: 1 | Status: SHIPPED | OUTPATIENT
Start: 2022-02-09 | End: 2022-02-11 | Stop reason: SDUPTHER

## 2022-02-09 NOTE — TELEPHONE ENCOUNTER
She is also asking for something to take when she takes car rides she gets sick in the car  Meclizine 25mg       She is at [address] [phone]

## 2022-02-11 DIAGNOSIS — F34.1 DYSTHYMIA: ICD-10-CM

## 2022-02-11 DIAGNOSIS — J30.81 ALLERGIC RHINITIS DUE TO ANIMAL HAIR AND DANDER: ICD-10-CM

## 2022-02-11 DIAGNOSIS — I10 BENIGN ESSENTIAL HTN: ICD-10-CM

## 2022-02-11 DIAGNOSIS — I10 ESSENTIAL HYPERTENSION, BENIGN: ICD-10-CM

## 2022-02-11 DIAGNOSIS — E87.6 HYPOKALEMIA: ICD-10-CM

## 2022-02-11 DIAGNOSIS — E78.5 DYSLIPIDEMIA: ICD-10-CM

## 2022-02-11 DIAGNOSIS — Z91.09 ENVIRONMENTAL ALLERGIES: ICD-10-CM

## 2022-02-11 DIAGNOSIS — F41.9 ANXIETY: ICD-10-CM

## 2022-02-11 RX ORDER — POTASSIUM CHLORIDE 750 MG/1
10 TABLET, EXTENDED RELEASE ORAL DAILY
Qty: 90 TABLET | Refills: 1 | Status: SHIPPED | OUTPATIENT
Start: 2022-02-11

## 2022-02-11 RX ORDER — AMLODIPINE BESYLATE 10 MG/1
10 TABLET ORAL DAILY
Qty: 90 TABLET | Refills: 1 | Status: SHIPPED | OUTPATIENT
Start: 2022-02-11

## 2022-02-11 RX ORDER — CETIRIZINE HYDROCHLORIDE 10 MG/1
10 TABLET ORAL DAILY
Qty: 90 TABLET | Refills: 1 | Status: SHIPPED | OUTPATIENT
Start: 2022-02-11

## 2022-02-11 RX ORDER — HYDROCHLOROTHIAZIDE 12.5 MG/1
12.5 TABLET ORAL DAILY
Qty: 90 TABLET | Refills: 1 | Status: SHIPPED | OUTPATIENT
Start: 2022-02-11

## 2022-02-11 RX ORDER — ATORVASTATIN CALCIUM 40 MG/1
40 TABLET, FILM COATED ORAL DAILY
Qty: 90 TABLET | Refills: 1 | Status: SHIPPED | OUTPATIENT
Start: 2022-02-11

## 2022-02-11 RX ORDER — LOSARTAN POTASSIUM 100 MG/1
100 TABLET ORAL DAILY
Qty: 90 TABLET | Refills: 1 | Status: SHIPPED | OUTPATIENT
Start: 2022-02-11

## 2022-02-11 NOTE — TELEPHONE ENCOUNTER
Medications were sent to the wrong pharmacy on 2/9/22  She needs them sent to the [facility] in [facility]

## 2022-05-20 DIAGNOSIS — F41.9 ANXIETY: ICD-10-CM

## 2022-05-20 DIAGNOSIS — Z91.09 ENVIRONMENTAL ALLERGIES: ICD-10-CM

## 2022-05-20 DIAGNOSIS — I10 ESSENTIAL HYPERTENSION, BENIGN: ICD-10-CM

## 2022-05-20 DIAGNOSIS — F34.1 DYSTHYMIA: ICD-10-CM

## 2022-05-20 DIAGNOSIS — E87.6 HYPOKALEMIA: ICD-10-CM

## 2022-05-20 DIAGNOSIS — J30.81 ALLERGIC RHINITIS DUE TO ANIMAL HAIR AND DANDER: ICD-10-CM

## 2022-05-20 DIAGNOSIS — I10 BENIGN ESSENTIAL HTN: ICD-10-CM

## 2022-05-20 DIAGNOSIS — E78.5 DYSLIPIDEMIA: ICD-10-CM

## 2022-05-20 RX ORDER — AMLODIPINE BESYLATE 10 MG/1
10 TABLET ORAL DAILY
Qty: 90 TABLET | Refills: 1 | Status: CANCELLED | OUTPATIENT
Start: 2022-05-20

## 2022-05-20 RX ORDER — CETIRIZINE HYDROCHLORIDE 10 MG/1
10 TABLET ORAL DAILY
Qty: 90 TABLET | Refills: 1 | Status: CANCELLED | OUTPATIENT
Start: 2022-05-20

## 2022-05-20 RX ORDER — POTASSIUM CHLORIDE 750 MG/1
10 TABLET, EXTENDED RELEASE ORAL DAILY
Qty: 90 TABLET | Refills: 1 | Status: CANCELLED | OUTPATIENT
Start: 2022-05-20

## 2022-05-20 RX ORDER — HYDROCHLOROTHIAZIDE 12.5 MG/1
12.5 TABLET ORAL DAILY
Qty: 90 TABLET | Refills: 1 | Status: CANCELLED | OUTPATIENT
Start: 2022-05-20

## 2022-05-20 RX ORDER — ATORVASTATIN CALCIUM 40 MG/1
40 TABLET, FILM COATED ORAL DAILY
Qty: 90 TABLET | Refills: 1 | Status: CANCELLED | OUTPATIENT
Start: 2022-05-20

## 2022-05-20 RX ORDER — LOSARTAN POTASSIUM 100 MG/1
100 TABLET ORAL DAILY
Qty: 90 TABLET | Refills: 1 | Status: CANCELLED | OUTPATIENT
Start: 2022-05-20

## 2022-05-20 NOTE — TELEPHONE ENCOUNTER
12/6/2021 6/16/2022    All medication have 1 additional refills   Patient needs to contact pharmacy for medication refills

## 2022-06-10 ENCOUNTER — RA CDI HCC (OUTPATIENT)
Dept: OTHER | Facility: HOSPITAL | Age: 72
End: 2022-06-10

## 2022-06-10 NOTE — PROGRESS NOTES
[facility] [address]  coding opportunities       Chart reviewed, no opportunity found: CHART REVIEWED, NO OPPORTUNITY FOUND        Patients Insurance     Medicare Insurance: Medicare

## 2022-06-17 ENCOUNTER — TELEPHONE (OUTPATIENT)
Dept: INTERNAL MEDICINE CLINIC | Facility: OTHER | Age: 72
End: 2022-06-17

## 2022-08-02 NOTE — TELEPHONE ENCOUNTER
Called and spoke to patient  She stated that she lives in [place] and is unable to come in for an appointment  It was recommended that if she can not come in to the office to be seen that she should maybe find a doctor that is closer to her home and she agreed